# Patient Record
Sex: MALE | Race: WHITE | NOT HISPANIC OR LATINO | Employment: FULL TIME
[De-identification: names, ages, dates, MRNs, and addresses within clinical notes are randomized per-mention and may not be internally consistent; named-entity substitution may affect disease eponyms.]

---

## 2019-11-04 ENCOUNTER — HEALTH MAINTENANCE LETTER (OUTPATIENT)
Age: 54
End: 2019-11-04

## 2020-02-20 ENCOUNTER — TRANSFERRED RECORDS (OUTPATIENT)
Dept: HEALTH INFORMATION MANAGEMENT | Facility: CLINIC | Age: 55
End: 2020-02-20
Payer: COMMERCIAL

## 2020-11-10 ENCOUNTER — TELEPHONE (OUTPATIENT)
Dept: FAMILY MEDICINE | Facility: OTHER | Age: 55
End: 2020-11-10

## 2020-11-10 NOTE — TELEPHONE ENCOUNTER
Reason for Call:  Other appointment    Detailed comments: Patient calling stating that there son was exposed to someone who had covid and there sone lives with them. Patient calling to see if they need to reschedule there appt for tomorrow or if they can still come in. Patient stated dont have any symptoms. Please give patient a call back and advise.    Phone Number Patient can be reached at: Cell number on file:    Telephone Information:   Mobile [phone]       Best Time: Anytime     Can we leave a detailed message on this number? Yes     Call taken on 11/10/2020 at 11:34 AM by Mary Taylor

## 2020-11-10 NOTE — TELEPHONE ENCOUNTER
Advised patient and wife of message below. Patient would like to change visit to video visit. Patient would like to meet provider to establish care and discuss medication. Closing encounter.

## 2020-11-10 NOTE — TELEPHONE ENCOUNTER
If your son has no symptoms, and you have not been particularly close to him since you became aware of his exposure, then I think we could continue with your visit tomorrow.  If your son is having symptoms, or you feel like your exposure to him was fairly significant and his exposure was about a week ago, then I would recommend rescheduling.     If you prefer, we could also schedule this as a virtual visit.

## 2020-11-16 ENCOUNTER — HEALTH MAINTENANCE LETTER (OUTPATIENT)
Age: 55
End: 2020-11-16

## 2020-12-10 ENCOUNTER — TELEPHONE (OUTPATIENT)
Dept: FAMILY MEDICINE | Facility: OTHER | Age: 55
End: 2020-12-10

## 2020-12-10 NOTE — TELEPHONE ENCOUNTER
I don't see that this patient is taking metformin and it is also not in his HX. Closing encounter    Natalia Johnson MA

## 2020-12-17 DIAGNOSIS — R73.02 GLUCOSE INTOLERANCE (IMPAIRED GLUCOSE TOLERANCE): Primary | ICD-10-CM

## 2020-12-17 NOTE — TELEPHONE ENCOUNTER
I have not formally established care with this patient.  He will need to schedule a visit for me to fill this medication.

## 2020-12-21 NOTE — TELEPHONE ENCOUNTER
Metformin - he is asking if he will be fine to go without until scheduled appt. On 1/6    He was scheduled on 11/11 and it is marked as a no show. He was at 8:30 and wife was at 9. Her chart was documented on and checked in, his was not, but he does remember having the visit with you at that same time. Please review and if approved please send in the Metformin.

## 2020-12-22 RX ORDER — METFORMIN HCL 500 MG
500 TABLET, EXTENDED RELEASE 24 HR ORAL
Qty: 20 TABLET | Refills: 0 | Status: SHIPPED | OUTPATIENT
Start: 2020-12-22 | End: 2021-01-06

## 2020-12-22 NOTE — TELEPHONE ENCOUNTER
LMTRC- please let patient know script was sent for him and additional refills will be sent when he is seen at his upcoming appt

## 2020-12-22 NOTE — TELEPHONE ENCOUNTER
The problem is that he was scheduled for a physical, and those can't be done virtually.  My recollection is that we did briefly discuss this and he elected to not go ahead with a formal visit at that time (though he was present during his wife's visit).  I recommended that he schedule with me in the near future.  Since he does have an appointment, I will give enough to get to that appointment, but if this doesn't occur for some reason, I will be unable to continue to prescribe these

## 2020-12-23 NOTE — TELEPHONE ENCOUNTER
Spoke to patient and let him know about script sent. He will come fasting to appt incase he needs labs.

## 2020-12-30 NOTE — PROGRESS NOTES
Subjective     Rajinder Siegel is a 55 year old male who presents to clinic today for the following health issues:    HPI         {SUPERLIST (Optional):655828}  {additonal problems for provider to add (Optional):096533}    Review of Systems   {ROS COMP (Optional):424951}      Objective    There were no vitals taken for this visit.  There is no height or weight on file to calculate BMI.  Physical Exam   {Exam List (Optional):109733}    {Diagnostic Test Results (Optional):899432}        {PROVIDER CHARTING PREFERENCE:067573}

## 2021-01-06 ENCOUNTER — VIRTUAL VISIT (OUTPATIENT)
Dept: FAMILY MEDICINE | Facility: OTHER | Age: 56
End: 2021-01-06
Payer: COMMERCIAL

## 2021-01-06 DIAGNOSIS — I10 ESSENTIAL HYPERTENSION, BENIGN: ICD-10-CM

## 2021-01-06 DIAGNOSIS — Z11.4 SCREENING FOR HIV (HUMAN IMMUNODEFICIENCY VIRUS): ICD-10-CM

## 2021-01-06 DIAGNOSIS — R21 RASH OF HAND: ICD-10-CM

## 2021-01-06 DIAGNOSIS — E78.2 MIXED HYPERLIPIDEMIA: ICD-10-CM

## 2021-01-06 DIAGNOSIS — R25.2 LEG CRAMPING: ICD-10-CM

## 2021-01-06 DIAGNOSIS — Z11.59 NEED FOR HEPATITIS C SCREENING TEST: ICD-10-CM

## 2021-01-06 DIAGNOSIS — R73.02 GLUCOSE INTOLERANCE (IMPAIRED GLUCOSE TOLERANCE): Primary | ICD-10-CM

## 2021-01-06 PROCEDURE — 99214 OFFICE O/P EST MOD 30 MIN: CPT | Mod: 95 | Performed by: FAMILY MEDICINE

## 2021-01-06 RX ORDER — ATORVASTATIN CALCIUM 20 MG/1
20 TABLET, FILM COATED ORAL DAILY
Qty: 90 TABLET | Refills: 3 | Status: SHIPPED | OUTPATIENT
Start: 2021-01-06 | End: 2022-01-21

## 2021-01-06 RX ORDER — ATORVASTATIN CALCIUM 20 MG/1
20 TABLET, FILM COATED ORAL DAILY
COMMUNITY
End: 2021-01-06

## 2021-01-06 RX ORDER — ATENOLOL AND CHLORTHALIDONE TABLET 50; 25 MG/1; MG/1
1 TABLET ORAL DAILY
Qty: 90 TABLET | Refills: 3 | Status: SHIPPED | OUTPATIENT
Start: 2021-01-06 | End: 2022-06-10

## 2021-01-06 RX ORDER — METFORMIN HCL 500 MG
500 TABLET, EXTENDED RELEASE 24 HR ORAL
Qty: 90 TABLET | Refills: 3 | Status: SHIPPED | OUTPATIENT
Start: 2021-01-06 | End: 2022-01-21

## 2021-01-06 NOTE — PATIENT INSTRUCTIONS
Thank you for visiting Our [facility]    Please get fasting labs in about a week.    We'll let you know your lab results as soon as we can.     Try magnesium for  Your tight muscles.    Try hydrocortisone cream for your rash.  If not improving, we should take a look at it.    Good  [place] with everything else.  Let me know if you need help.    Contact us or return if questions or concerns.     Have a nice day!    Dr. Jolley     Return in about 6 months (around 7/6/2021) for Recheck.      If you need medication refills, please contact your pharmacy 3 days before your prescriptions runs out or download the [facility] gulshan for your smart phone.   If you are out of refills, your pharmacy will contact contact the clinic.                                     [employer] assistance [phone]

## 2021-01-06 NOTE — PROGRESS NOTES
Rajinder Siegel is a 56 year old male who is being evaluated via a billable telephone visit.      What phone number would you like to be contacted at? [phone]  How would you like to obtain your AVS? MyChart  Assessment & Plan       ICD-10-CM    1. Glucose intolerance (impaired glucose tolerance)  R73.02 Lipid panel reflex to direct LDL Fasting     **A1C FUTURE 3mo     metFORMIN (GLUCOPHAGE-XR) 500 MG 24 hr tablet   2. Essential hypertension, benign  I10 Comprehensive metabolic panel     atenolol-chlorthalidone (TENORETIC) 50-25 MG tablet     Magnesium   3. Mixed hyperlipidemia  E78.2 Lipid panel reflex to direct LDL Fasting     Comprehensive metabolic panel     atenolol-chlorthalidone (TENORETIC) 50-25 MG tablet     atorvastatin (LIPITOR) 20 MG tablet   4. Leg cramping  R25.2 Magnesium   5. Rash of hand  R21    6. Need for hepatitis C screening test  Z11.59 Hepatitis C Screen Reflex to HCV RNA Quant and Genotype   7. Screening for HIV (human immunodeficiency virus)  Z11.4 HIV Antigen Antibody Combo      1.  Clinically tolerating Metformin well.  He would be interested in stopping this.  He has lost about 35 pounds over the last several months.  We will recheck A1c and determine if he needs to continue with Metformin or not.  Continue efforts with lifestyle modifications to prevent progression to diabetes.  2.  Clinically controlled.  Will continue current regimen and check monitoring labs.  3.  Clinically controlled.  Will continue current statin therapy and check monitoring labs.  4.  Possibly reaction to atorvastatin, but this seems unlikely.  I wonder about a possible electrolyte imbalance.  We will check those today.  Recommended trial of magnesium to see if this is helpful.  If not responding, could consider co-Q10 or in clinic evaluation.  5.  Unclear etiology.  Somewhat suspicious for chronic eczema.  Recommended trial of over-the-counter hydrocortisone.  If not responding, will need to see patient in  person.  6, 7.  Screening ordered.    Portions of this note were completed using Dragon dictation software.  Although reviewed, there may be typographical and other inadvertent errors that remain.         30 minutes spent on the date of the encounter doing chart review, history and exam, documentation and further activities as noted above           Patient Instructions   Thank you for visiting [facility]    Please get fasting labs in about a week.    We'll let you know your lab results as soon as we can.     Try magnesium for  Your tight muscles.    Try hydrocortisone cream for your rash.  If not improving, we should take a look at it.    Good  [place] with everything else.  Let me know if you need help.    Contact us or return if questions or concerns.     Have a nice day!    Dr. Jolley     Return in about 6 months (around 2021) for Recheck.      If you need medication refills, please contact your pharmacy 3 days before your prescriptions runs out or download the [facility] gulshan for your smart phone.   If you are out of refills, your pharmacy will contact contact the clinic.                                     [employer] assistance [phone]                       Return in about 6 months (around 2021) for Recheck.    Marco Jolley MD, MD  [facility]     Rajinder Siegel is a 56 year old who presents to clinic today for a phone visit.    HPI     2021 Mailed TRAVIS to patient for Previous Clinic:  [facility]   Kristina St MD     Pt's mother  yesterday.  Had viral pneumonia.      Pt was recently exposed to COVID when their son-in-law picked [place] a friend at the airport.  [place]  Son-in-law found out on the way home from pt's birthday party 2 days ago.      This is why today's visit is via telephone.    His last colonoscopy was at age 49, was normal.      Pt notes a rash on his hand for a long  time.  There was no e/o fungal infection previously.  Not sure what it is.     Notes some muscle tightness in his legs.      Diabetes Follow-up    How often are you checking your blood sugar? Not at all    What concerns do you have today about your diabetes? None     Do you have any of these symptoms? (Select all that apply)  No numbness or tingling in feet.  No redness, sores or blisters on feet.  No complaints of excessive thirst.  No reports of blurry vision.  No significant changes to weight.        Pt is on metformin for  Prediabetes.  He has lost 35 lbs  Since his last a1c.      Would like to get tested  To see  If he still needs  To say on metformin.      Hyperlipidemia Follow-Up      Are you regularly taking any medication or supplement to lower your cholesterol?   Yes- atorvastatin    Are you having muscle aches or other side effects that you think could be caused by your cholesterol lowering medication?  No    Hypertension Follow-up      Do you check your blood pressure regularly outside of the clinic? Yes     Are you following a low salt diet? Yes    Are your blood pressures ever more than 140 on the top number (systolic) OR more   than 90 on the bottom number (diastolic), for example 140/90? No    BP Readings from Last 2 Encounters:   01/10/14 124/70   10/09/13 127/83     Hemoglobin A1C (%)   Date Value   08/27/2013 5.8   03/12/2013 6.2 (H)     LDL Cholesterol Calculated (mg/dL)   Date Value   08/27/2013 86   03/08/2013 106           Review of Systems   Constitutional, HEENT, cardiovascular, pulmonary, gi and gu systems are negative, except as otherwise noted.      Objective    Vitals - Patient Reported  Systolic (Patient Reported): 128  Diastolic (Patient Reported): 80  Weight (Patient Reported): 93 kg (205 lb)      Vitals:  No vitals were obtained today due to virtual visit.    Physical Exam   healthy, alert and no distress  PSYCH: Alert and oriented times 3; coherent speech, normal   rate and volume,  able to articulate logical thoughts, able   to abstract reason, no tangential thoughts, no hallucinations   or delusions  His affect is normal  RESP: No cough, no audible wheezing, able to talk in full sentences  Remainder of exam unable to be completed due to telephone visits    No results found for any visits on 01/06/21.            Phone call duration: 25 minutes

## 2021-01-13 DIAGNOSIS — R73.02 GLUCOSE INTOLERANCE (IMPAIRED GLUCOSE TOLERANCE): ICD-10-CM

## 2021-01-13 DIAGNOSIS — R25.2 LEG CRAMPING: ICD-10-CM

## 2021-01-13 DIAGNOSIS — E78.2 MIXED HYPERLIPIDEMIA: ICD-10-CM

## 2021-01-13 DIAGNOSIS — Z11.4 SCREENING FOR HIV (HUMAN IMMUNODEFICIENCY VIRUS): ICD-10-CM

## 2021-01-13 DIAGNOSIS — Z11.59 NEED FOR HEPATITIS C SCREENING TEST: ICD-10-CM

## 2021-01-13 DIAGNOSIS — I10 ESSENTIAL HYPERTENSION, BENIGN: ICD-10-CM

## 2021-01-13 LAB
ALBUMIN SERPL-MCNC: 4.1 G/DL (ref 3.4–5)
ALP SERPL-CCNC: 70 U/L (ref 40–150)
ALT SERPL W P-5'-P-CCNC: 48 U/L (ref 0–70)
ANION GAP SERPL CALCULATED.3IONS-SCNC: 4 MMOL/L (ref 3–14)
AST SERPL W P-5'-P-CCNC: 28 U/L (ref 0–45)
BILIRUB SERPL-MCNC: 1.9 MG/DL (ref 0.2–1.3)
BUN SERPL-MCNC: 11 MG/DL (ref 7–30)
CALCIUM SERPL-MCNC: 8.9 MG/DL (ref 8.5–10.1)
CHLORIDE SERPL-SCNC: 103 MMOL/L (ref 94–109)
CHOLEST SERPL-MCNC: 117 MG/DL
CO2 SERPL-SCNC: 32 MMOL/L (ref 20–32)
CREAT SERPL-MCNC: 0.89 MG/DL (ref 0.66–1.25)
GFR SERPL CREATININE-BSD FRML MDRD: >90 ML/MIN/{1.73_M2}
GLUCOSE SERPL-MCNC: 141 MG/DL (ref 70–99)
HCV AB SERPL QL IA: NONREACTIVE
HDLC SERPL-MCNC: 35 MG/DL
HIV 1+2 AB+HIV1 P24 AG SERPL QL IA: NONREACTIVE
LDLC SERPL CALC-MCNC: 40 MG/DL
MAGNESIUM SERPL-MCNC: 2.2 MG/DL (ref 1.6–2.3)
NONHDLC SERPL-MCNC: 82 MG/DL
POTASSIUM SERPL-SCNC: 3.4 MMOL/L (ref 3.4–5.3)
PROT SERPL-MCNC: 7.4 G/DL (ref 6.8–8.8)
SODIUM SERPL-SCNC: 139 MMOL/L (ref 133–144)
TRIGL SERPL-MCNC: 209 MG/DL

## 2021-01-13 PROCEDURE — 83735 ASSAY OF MAGNESIUM: CPT | Performed by: FAMILY MEDICINE

## 2021-01-13 PROCEDURE — 80053 COMPREHEN METABOLIC PANEL: CPT | Performed by: FAMILY MEDICINE

## 2021-01-13 PROCEDURE — 80061 LIPID PANEL: CPT | Performed by: FAMILY MEDICINE

## 2021-01-13 PROCEDURE — 86803 HEPATITIS C AB TEST: CPT | Performed by: FAMILY MEDICINE

## 2021-01-13 PROCEDURE — 87389 HIV-1 AG W/HIV-1&-2 AB AG IA: CPT | Performed by: FAMILY MEDICINE

## 2021-01-13 PROCEDURE — 36415 COLL VENOUS BLD VENIPUNCTURE: CPT | Performed by: FAMILY MEDICINE

## 2021-01-14 NOTE — RESULT ENCOUNTER NOTE
Rajinder,    Most of your labs were normal for you.  Unfortunately, your blood sugar was elevated into the diabetic range.  You definitely need to stay on the Metformin.  We should actually think about how you take twice the dose.  I would recommend that we do an A1c test to evaluate what your blood sugars have been over the last 3 months.  Unfortunately, they did not draw the right tube for this at your last visit.  You can either come in soon or get this done in 3 months.    Additionally, your bilirubin is mildly elevated.  This probably is nothing, but we should recheck this with your next blood draw.    Have a nice day!    Dr. Jolley

## 2021-01-15 ENCOUNTER — HEALTH MAINTENANCE LETTER (OUTPATIENT)
Age: 56
End: 2021-01-15

## 2021-01-19 DIAGNOSIS — R73.02 GLUCOSE INTOLERANCE (IMPAIRED GLUCOSE TOLERANCE): ICD-10-CM

## 2021-01-19 LAB — HBA1C MFR BLD: 6 % (ref 0–5.6)

## 2021-01-19 PROCEDURE — 36415 COLL VENOUS BLD VENIPUNCTURE: CPT | Performed by: FAMILY MEDICINE

## 2021-01-19 PROCEDURE — 83036 HEMOGLOBIN GLYCOSYLATED A1C: CPT | Performed by: FAMILY MEDICINE

## 2021-01-19 NOTE — RESULT ENCOUNTER NOTE
Rajinder,    All of your labs were normal for you.  I would recommend continuing metformin.    Have a nice day!    Dr. Jolley

## 2021-05-29 ENCOUNTER — HEALTH MAINTENANCE LETTER (OUTPATIENT)
Age: 56
End: 2021-05-29

## 2021-06-11 ENCOUNTER — MYC MEDICAL ADVICE (OUTPATIENT)
Dept: FAMILY MEDICINE | Facility: OTHER | Age: 56
End: 2021-06-11

## 2021-07-06 ENCOUNTER — TELEPHONE (OUTPATIENT)
Dept: FAMILY MEDICINE | Facility: OTHER | Age: 56
End: 2021-07-06

## 2021-07-06 DIAGNOSIS — R73.02 GLUCOSE INTOLERANCE (IMPAIRED GLUCOSE TOLERANCE): Primary | ICD-10-CM

## 2021-07-08 DIAGNOSIS — R73.02 GLUCOSE INTOLERANCE (IMPAIRED GLUCOSE TOLERANCE): ICD-10-CM

## 2021-07-08 LAB — HBA1C MFR BLD: 6 % (ref 0–5.6)

## 2021-07-08 PROCEDURE — 83036 HEMOGLOBIN GLYCOSYLATED A1C: CPT | Performed by: PHYSICIAN ASSISTANT

## 2021-07-08 PROCEDURE — 36415 COLL VENOUS BLD VENIPUNCTURE: CPT | Performed by: PHYSICIAN ASSISTANT

## 2021-07-13 NOTE — PROGRESS NOTES
{PROVIDER CHARTING PREFERENCE:448762}    Subjective   Rajinder is a 56 year old who presents for the following health issues {ACCOMPANIED BY STATEMENT (Optional):519473}    HPI     {SUPERLIST (Optional):824423}  {additonal problems for provider to add (Optional):938143}    Review of Systems   {ROS COMP (Optional):992284}      Objective    There were no vitals taken for this visit.  There is no height or weight on file to calculate BMI.  Physical Exam   {Exam List (Optional):674359}    {Diagnostic Test Results (Optional):050015}    {AMBULATORY ATTESTATION (Optional):001130}

## 2021-07-14 ENCOUNTER — MYC MEDICAL ADVICE (OUTPATIENT)
Dept: FAMILY MEDICINE | Facility: OTHER | Age: 56
End: 2021-07-14

## 2021-07-15 ENCOUNTER — VIRTUAL VISIT (OUTPATIENT)
Dept: FAMILY MEDICINE | Facility: OTHER | Age: 56
End: 2021-07-15
Payer: COMMERCIAL

## 2021-07-15 DIAGNOSIS — J06.9 UPPER RESPIRATORY TRACT INFECTION, UNSPECIFIED TYPE: Primary | ICD-10-CM

## 2021-07-15 DIAGNOSIS — Z11.52 ENCOUNTER FOR SCREENING LABORATORY TESTING FOR COVID-19 VIRUS: ICD-10-CM

## 2021-07-15 DIAGNOSIS — G47.33 OSA ON CPAP: ICD-10-CM

## 2021-07-15 DIAGNOSIS — R73.02 GLUCOSE INTOLERANCE (IMPAIRED GLUCOSE TOLERANCE): ICD-10-CM

## 2021-07-15 DIAGNOSIS — I10 ESSENTIAL HYPERTENSION, BENIGN: ICD-10-CM

## 2021-07-15 PROCEDURE — 99214 OFFICE O/P EST MOD 30 MIN: CPT | Mod: 95 | Performed by: FAMILY MEDICINE

## 2021-07-15 NOTE — PATIENT INSTRUCTIONS
Thank you for visiting Our [facility]    Try the Flonase to help with your nasal congestion.  Can use twice daily if needed while you're ill.     If not improving in the next day or 2, then I would recommend proceeding with a Covid test.    Your blood sugars are stable based upon our labs.  It sounds like your blood pressures have been good too.    I have placed an order for your CPAP supplies.  If any difficulties with these or if you feel like we need to reassess, we can certainly get you in with the sleep specialist for a reevaluation of your CPAP.    Contact us or return if questions or concerns.     Have a nice day!    Dr. Jolley     No follow-ups on file.      If you need medication refills, please contact your pharmacy 3 days before your prescriptions runs out or download the [facility] gulshan for your smart phone. If you are out of refills, your pharmacy will contact contact the clinic.                                     MyChart Assistance [phone]

## 2021-07-15 NOTE — PROGRESS NOTES
Rajinder is a 56 year old who is being evaluated via a billable telephone visit.      What phone number would you like to be contacted at? [phone]  How would you like to obtain your AVS? MyChart    Assessment & Plan       ICD-10-CM    1. Upper respiratory tract infection, unspecified type  J06.9 Symptomatic COVID-19 Virus (Coronavirus) by PCR Nasopharyngeal   2. Encounter for screening laboratory testing for COVID-19 virus  Z20.822 Symptomatic COVID-19 Virus (Coronavirus) by PCR Nasopharyngeal   3. Glucose intolerance (impaired glucose tolerance)  R73.02    4. RACHEAL on CPAP  G47.33 CPAP Order for DME - ONLY FOR DME    Z99.89    5. Essential hypertension, benign  I10       1, 2.  Clinically suspicious for a viral process.  Cannot rule out the possibility of Covid infection.  Covid testing was ordered today.  Discussed symptomatic treatment with Flonase and further over-the-counter medications.  Follow-up if not improving.  3.  Likely prediabetic for well-controlled diabetes.  Will continue current regimen.  4.  Clinically doing well on CPAP.  This was reordered.  We will continue to manage as able.  5.  Clinically controlled.  Will continue current regimen.    Portions of this note were completed using Dragon dictation software.  Although reviewed, there may be typographical and other inadvertent errors that remain.       Ordering of each unique test  Prescription drug management  30 minutes spent on the date of the encounter doing chart review, history and exam, documentation and further activities per the note       Patient Instructions   Thank you for visiting [facility]    Try the Flonase to help with your nasal congestion.  Can use twice daily if needed while you're ill.     If not improving in the next day or 2, then I would recommend proceeding with a Covid test.    Your blood sugars are stable based upon our labs.  It sounds like your blood pressures have been good too.    I have placed an  order for your CPAP supplies.  If any difficulties with these or if you feel like we need to reassess, we can certainly get you in with the sleep specialist for a reevaluation of your CPAP.    Contact us or return if questions or concerns.     Have a nice day!    Dr. Jolley     No follow-ups on file.      If you need medication refills, please contact your pharmacy 3 days before your prescriptions runs out or download the [facility] gulshan for your smart phone. If you are out of refills, your pharmacy will contact contact the clinic.                                     Tracie Rome [phone]                       Return in about 6 months (around 1/15/2022) for Recheck.    Marco Jolley MD, MD  [facility] ADITI Calvillo is a 56 year old who presents for the following health issues     HPI     Discuss CPAP machine/supplies.  Got his CPAP 15 years ago.  It seems to be working well.  He can tell that it  Helps with his breathing.      Diabetes Follow-up    How often are you checking your blood sugar? Not at all    What concerns do you have today about your diabetes? Discuss recent lab results.      Do you have any of these symptoms? (Select all that apply)  No numbness or tingling in feet.  No redness, sores or blisters on feet.  No complaints of excessive thirst.  No reports of blurry vision.  No significant changes to weight.     His recent a1c was good.            Hyperlipidemia Follow-Up    Are you regularly taking any medication or supplement to lower your cholesterol?   Yes- Atorvastatin     Are you having muscle aches or other side effects that you think could be caused by your cholesterol lowering medication?  Unsure    Hypertension Follow-up    Do you check your blood pressure regularly outside of the clinic? Yes - occasionally, not everyday.     Are you following a low salt diet? Yes    Are your blood pressures ever more than 140 on the top number (systolic)  OR more than 90 on the bottom number (diastolic), for example 140/90? No    BP Readings from Last 2 Encounters:   01/10/14 124/70   10/09/13 127/83     Hemoglobin A1C (%)   Date Value   07/08/2021 6.0 (H)   01/19/2021 6.0 (H)     LDL Cholesterol Calculated (mg/dL)   Date Value   01/13/2021 40   08/27/2013 86       How many servings of fruits and vegetables do you eat daily?  4 or more    On average, how many sweetened beverages do you drink each day (Examples: soda, juice, sweet tea, etc.  Do NOT count diet or artificially sweetened beverages)?   0    How many days per week do you exercise enough to make your heart beat faster? 3 or less    How many minutes a day do you exercise enough to make your heart beat faster? 9 or less    How many days per week do you miss taking your medication? 0    Acute Illness  Acute illness concerns: congestion, cough  Onset/Duration: about 3-4 days.   Symptoms:  Fever: unknown, doesn't have a thermometer.   Chills/Sweats: YES - hot and cold flashes.   Headache (location?): YES  Sinus Pressure: no  Conjunctivitis:  no  Ear Pain: no  Rhinorrhea: YES  Congestion: YES  Sore Throat: YES  Cough: YES-non-productive, with shortness of breath  Wheeze: YES  Decreased Appetite: no  Nausea: no  Vomiting: no  Diarrhea: no  Dysuria/Freq.: no  Dysuria or Hematuria: no  Fatigue/Achiness: YES  Sick/Strep Exposure: YES- grandson was sick first. Kept sneezing in his face.   Therapies tried and outcome: natural tea    Pt got URI about 2 days ago.  His grandson got sick first.  Has spread from there.  Grandson is much better now.  Having chest congestion, non-productive cough, sinus pain, HA.  Slight ST from the drainage.      He just started some otc medication for it.  Slight improvement.  Discussed trial of Flonase.          Review of Systems   Constitutional, HEENT, cardiovascular, pulmonary, gi and gu systems are negative, except as otherwise noted.      Objective           Vitals:  No vitals were  obtained today due to virtual visit.    Physical Exam   healthy, alert and no distress  PSYCH: Alert and oriented times 3; coherent speech, normal   rate and volume, able to articulate logical thoughts, able   to abstract reason, no tangential thoughts, no hallucinations   or delusions  His affect is normal  RESP: congestion, slight cough,  able to talk in full sentences  Remainder of exam unable to be completed due to telephone visits    Orders Only on 07/08/2021   Component Date Value Ref Range Status     Hemoglobin A1C 07/08/2021 6.0* 0 - 5.6 % Final    Comment: Normal <5.7% Prediabetes 5.7-6.4%  Diabetes 6.5% or higher - adopted from ADA   consensus guidelines.                   Phone call duration: 13 minutes

## 2021-07-15 NOTE — TELEPHONE ENCOUNTER
Will discuss with LIANA as he is already booked now during virtual spots. Not sure if he wants to keep at 330 but call in morning. Closing encounter  Kiley Castellano MA

## 2021-09-15 DIAGNOSIS — I10 ESSENTIAL HYPERTENSION, BENIGN: ICD-10-CM

## 2021-09-15 DIAGNOSIS — E78.2 MIXED HYPERLIPIDEMIA: ICD-10-CM

## 2021-09-15 RX ORDER — ATENOLOL 50 MG/1
50 TABLET ORAL DAILY
Qty: 90 TABLET | Refills: 0 | Status: SHIPPED | OUTPATIENT
Start: 2021-09-15 | End: 2022-01-21

## 2021-09-15 RX ORDER — CHLORTHALIDONE 25 MG/1
25 TABLET ORAL DAILY
Qty: 90 TABLET | Refills: 0 | Status: SHIPPED | OUTPATIENT
Start: 2021-09-15 | End: 2022-01-21

## 2021-09-18 ENCOUNTER — HEALTH MAINTENANCE LETTER (OUTPATIENT)
Age: 56
End: 2021-09-18

## 2021-11-10 ENCOUNTER — MYC MEDICAL ADVICE (OUTPATIENT)
Dept: FAMILY MEDICINE | Facility: OTHER | Age: 56
End: 2021-11-10
Payer: COMMERCIAL

## 2021-11-13 ENCOUNTER — HEALTH MAINTENANCE LETTER (OUTPATIENT)
Age: 56
End: 2021-11-13

## 2022-01-08 ENCOUNTER — HEALTH MAINTENANCE LETTER (OUTPATIENT)
Age: 57
End: 2022-01-08

## 2022-01-20 DIAGNOSIS — I10 ESSENTIAL HYPERTENSION, BENIGN: ICD-10-CM

## 2022-01-20 DIAGNOSIS — R73.02 GLUCOSE INTOLERANCE (IMPAIRED GLUCOSE TOLERANCE): ICD-10-CM

## 2022-01-20 DIAGNOSIS — E78.2 MIXED HYPERLIPIDEMIA: ICD-10-CM

## 2022-01-20 NOTE — TELEPHONE ENCOUNTER
Pending Prescriptions:                       Disp   Refills    atorvastatin (LIPITOR) 20 MG tablet [Pharm*90 tab*0        Sig: Take 1 tablet by mouth once daily    metFORMIN (GLUCOPHAGE-XR) 500 MG 24 hr tab*90 tab*0        Sig: TAKE 1 TABLET BY MOUTH ONCE DAILY WITH  DINNER    atenolol (TENORMIN) 50 MG tablet [Pharmacy*90 tab*0        Sig: Take 1 tablet by mouth once daily    chlorthalidone (HYGROTON) 25 MG tablet [Ph*90 tab*0        Sig: Take 1 tablet by mouth once daily    Routing refill request to provider for review/approval because:  Labs not current:    Lab Results   Component Value Date    A1C 6.0 07/08/2021    A1C 6.0 01/19/2021    A1C 5.8 08/27/2013    A1C 6.2 03/12/2013    A1C 5.8 06/15/2012     Creatinine   Date Value Ref Range Status   01/13/2021 0.89 0.66 - 1.25 mg/dL Final     BP Readings from Last 3 Encounters:   01/10/14 124/70   10/09/13 127/83   08/27/13 114/76     Potassium   Date Value Ref Range Status   01/13/2021 3.4 3.4 - 5.3 mmol/L Final     Recent Labs   Lab Test 01/13/21  1006   CHOL 117   HDL 35*   LDL 40   TRIG 209*       Patient needs to be seen because:  was told to return around 1/15/22 for recheck.     Sindi Quinn RN on 1/20/2022 at 12:54 PM

## 2022-01-21 RX ORDER — ATENOLOL 50 MG/1
TABLET ORAL
Qty: 30 TABLET | Refills: 0 | Status: SHIPPED | OUTPATIENT
Start: 2022-01-21 | End: 2022-02-21

## 2022-01-21 RX ORDER — CHLORTHALIDONE 25 MG/1
TABLET ORAL
Qty: 30 TABLET | Refills: 0 | Status: SHIPPED | OUTPATIENT
Start: 2022-01-21 | End: 2022-02-21

## 2022-01-21 RX ORDER — METFORMIN HCL 500 MG
TABLET, EXTENDED RELEASE 24 HR ORAL
Qty: 30 TABLET | Refills: 0 | Status: SHIPPED | OUTPATIENT
Start: 2022-01-21 | End: 2022-02-21

## 2022-01-21 RX ORDER — ATORVASTATIN CALCIUM 20 MG/1
TABLET, FILM COATED ORAL
Qty: 30 TABLET | Refills: 0 | Status: SHIPPED | OUTPATIENT
Start: 2022-01-21 | End: 2022-02-11 | Stop reason: SINTOL

## 2022-01-21 NOTE — TELEPHONE ENCOUNTER
Your medication has been refilled.  Please schedule a visit (well visit or virtual visit with labs OK) to follow up on how this medication is working for you before your next refill.  We need to be sure everything is working well and stable prior to further refills.

## 2022-01-24 ENCOUNTER — TELEPHONE (OUTPATIENT)
Dept: FAMILY MEDICINE | Facility: OTHER | Age: 57
End: 2022-01-24
Payer: COMMERCIAL

## 2022-01-24 DIAGNOSIS — G47.33 OSA ON CPAP: ICD-10-CM

## 2022-01-24 DIAGNOSIS — Z00.00 ENCOUNTER FOR PREVENTIVE CARE: ICD-10-CM

## 2022-01-24 DIAGNOSIS — I10 ESSENTIAL HYPERTENSION, BENIGN: ICD-10-CM

## 2022-01-24 DIAGNOSIS — R73.02 GLUCOSE INTOLERANCE (IMPAIRED GLUCOSE TOLERANCE): Primary | ICD-10-CM

## 2022-01-24 DIAGNOSIS — E78.2 MIXED HYPERLIPIDEMIA: ICD-10-CM

## 2022-01-24 NOTE — TELEPHONE ENCOUNTER
Reason for Call: Request for an order or referral:    Order or referral being requested: patient was told to get labs and an appointment, he would like labs done first so that they can be discussed at the appointment and would like those orders placed    Date needed: as soon as possible    Has the patient been seen by the PCP for this problem? YES    Additional comments: none    Phone number Patient can be reached at:  Cell number on file:    Telephone Information:   Mobile [phone]       Best Time:  Any     Can we leave a detailed message on this number?  YES    Call taken on 1/24/2022 at 5:06 PM by Abigail Elliott

## 2022-02-02 ENCOUNTER — LAB (OUTPATIENT)
Dept: LAB | Facility: OTHER | Age: 57
End: 2022-02-02
Payer: COMMERCIAL

## 2022-02-02 DIAGNOSIS — G47.33 OSA ON CPAP: ICD-10-CM

## 2022-02-02 DIAGNOSIS — Z00.00 ENCOUNTER FOR PREVENTIVE CARE: ICD-10-CM

## 2022-02-02 DIAGNOSIS — I10 ESSENTIAL HYPERTENSION, BENIGN: ICD-10-CM

## 2022-02-02 DIAGNOSIS — E78.2 MIXED HYPERLIPIDEMIA: ICD-10-CM

## 2022-02-02 DIAGNOSIS — R73.02 GLUCOSE INTOLERANCE (IMPAIRED GLUCOSE TOLERANCE): ICD-10-CM

## 2022-02-02 LAB
ALBUMIN SERPL-MCNC: 4 G/DL (ref 3.4–5)
ALP SERPL-CCNC: 63 U/L (ref 40–150)
ALT SERPL W P-5'-P-CCNC: 39 U/L (ref 0–70)
ANION GAP SERPL CALCULATED.3IONS-SCNC: 4 MMOL/L (ref 3–14)
AST SERPL W P-5'-P-CCNC: 28 U/L (ref 0–45)
BILIRUB SERPL-MCNC: 1.9 MG/DL (ref 0.2–1.3)
BUN SERPL-MCNC: 17 MG/DL (ref 7–30)
CALCIUM SERPL-MCNC: 9 MG/DL (ref 8.5–10.1)
CHLORIDE BLD-SCNC: 105 MMOL/L (ref 94–109)
CHOLEST SERPL-MCNC: 102 MG/DL
CO2 SERPL-SCNC: 31 MMOL/L (ref 20–32)
CREAT SERPL-MCNC: 0.91 MG/DL (ref 0.66–1.25)
ERYTHROCYTE [DISTWIDTH] IN BLOOD BY AUTOMATED COUNT: 13.4 % (ref 10–15)
FASTING STATUS PATIENT QL REPORTED: YES
GFR SERPL CREATININE-BSD FRML MDRD: >90 ML/MIN/1.73M2
GLUCOSE BLD-MCNC: 139 MG/DL (ref 70–99)
HBA1C MFR BLD: 6.2 % (ref 0–5.6)
HCT VFR BLD AUTO: 42.3 % (ref 40–53)
HDLC SERPL-MCNC: 32 MG/DL
HGB BLD-MCNC: 14.2 G/DL (ref 13.3–17.7)
LDLC SERPL CALC-MCNC: 37 MG/DL
MCH RBC QN AUTO: 29.8 PG (ref 26.5–33)
MCHC RBC AUTO-ENTMCNC: 33.6 G/DL (ref 31.5–36.5)
MCV RBC AUTO: 89 FL (ref 78–100)
NONHDLC SERPL-MCNC: 70 MG/DL
PLATELET # BLD AUTO: 211 10E3/UL (ref 150–450)
POTASSIUM BLD-SCNC: 3.7 MMOL/L (ref 3.4–5.3)
PROT SERPL-MCNC: 7.2 G/DL (ref 6.8–8.8)
RBC # BLD AUTO: 4.76 10E6/UL (ref 4.4–5.9)
SODIUM SERPL-SCNC: 140 MMOL/L (ref 133–144)
TRIGL SERPL-MCNC: 163 MG/DL
TSH SERPL DL<=0.005 MIU/L-ACNC: 2.4 MU/L (ref 0.4–4)
WBC # BLD AUTO: 6 10E3/UL (ref 4–11)

## 2022-02-02 PROCEDURE — 80061 LIPID PANEL: CPT

## 2022-02-02 PROCEDURE — 36415 COLL VENOUS BLD VENIPUNCTURE: CPT

## 2022-02-02 PROCEDURE — 85027 COMPLETE CBC AUTOMATED: CPT

## 2022-02-02 PROCEDURE — 83036 HEMOGLOBIN GLYCOSYLATED A1C: CPT

## 2022-02-02 PROCEDURE — 84443 ASSAY THYROID STIM HORMONE: CPT

## 2022-02-02 PROCEDURE — 80053 COMPREHEN METABOLIC PANEL: CPT

## 2022-02-03 ENCOUNTER — MYC REFILL (OUTPATIENT)
Dept: FAMILY MEDICINE | Facility: OTHER | Age: 57
End: 2022-02-03
Payer: COMMERCIAL

## 2022-02-03 DIAGNOSIS — R73.02 GLUCOSE INTOLERANCE (IMPAIRED GLUCOSE TOLERANCE): ICD-10-CM

## 2022-02-03 DIAGNOSIS — E78.2 MIXED HYPERLIPIDEMIA: ICD-10-CM

## 2022-02-03 DIAGNOSIS — I10 ESSENTIAL HYPERTENSION, BENIGN: ICD-10-CM

## 2022-02-03 RX ORDER — ATORVASTATIN CALCIUM 20 MG/1
20 TABLET, FILM COATED ORAL DAILY
Qty: 30 TABLET | Refills: 0 | OUTPATIENT
Start: 2022-02-03

## 2022-02-03 RX ORDER — ATENOLOL 50 MG/1
50 TABLET ORAL DAILY
Qty: 30 TABLET | Refills: 0 | OUTPATIENT
Start: 2022-02-03

## 2022-02-03 RX ORDER — CHLORTHALIDONE 25 MG/1
25 TABLET ORAL DAILY
Qty: 30 TABLET | Refills: 0 | OUTPATIENT
Start: 2022-02-03

## 2022-02-03 RX ORDER — METFORMIN HCL 500 MG
TABLET, EXTENDED RELEASE 24 HR ORAL
Qty: 30 TABLET | Refills: 0 | OUTPATIENT
Start: 2022-02-03

## 2022-02-07 ENCOUNTER — TELEPHONE (OUTPATIENT)
Dept: FAMILY MEDICINE | Facility: OTHER | Age: 57
End: 2022-02-07
Payer: COMMERCIAL

## 2022-02-07 NOTE — TELEPHONE ENCOUNTER
Reason for Call:  Form, our goal is to have forms completed with 72 hours, however, some forms may require a visit or additional information.    Type of letter, form or note:  medical    Who is the form from?: [employer] (if other please explain)    Where did the form come from: Patient or family brought in       What clinic location was the form placed at?: [facility] [phone]    Where the form was placed: form d bin    What number is listed as a contact on the form?: telephone [phone]       Additional comments: please call patient when done    Call taken on 2/7/2022 at 3:02 PM by Dolores Gay

## 2022-02-09 ENCOUNTER — MYC MEDICAL ADVICE (OUTPATIENT)
Dept: FAMILY MEDICINE | Facility: OTHER | Age: 57
End: 2022-02-09
Payer: COMMERCIAL

## 2022-02-09 DIAGNOSIS — E78.2 MIXED HYPERLIPIDEMIA: Primary | ICD-10-CM

## 2022-02-11 RX ORDER — ROSUVASTATIN CALCIUM 10 MG/1
10 TABLET, COATED ORAL DAILY
Qty: 90 TABLET | Refills: 0 | Status: SHIPPED | OUTPATIENT
Start: 2022-02-11 | End: 2022-05-24

## 2022-02-11 NOTE — TELEPHONE ENCOUNTER
, please review and advise.   Austin Mills, FEIN, RN, PHN  [place]  February 11, 2022

## 2022-02-21 ENCOUNTER — MYC REFILL (OUTPATIENT)
Dept: FAMILY MEDICINE | Facility: OTHER | Age: 57
End: 2022-02-21
Payer: COMMERCIAL

## 2022-02-21 DIAGNOSIS — I10 ESSENTIAL HYPERTENSION, BENIGN: ICD-10-CM

## 2022-02-21 DIAGNOSIS — R73.02 GLUCOSE INTOLERANCE (IMPAIRED GLUCOSE TOLERANCE): ICD-10-CM

## 2022-02-21 NOTE — TELEPHONE ENCOUNTER
"Pending Prescriptions:                       Disp   Refills    metFORMIN (GLUCOPHAGE-XR) 500 MG 24 hr tab*30 tab*0          atenolol (TENORMIN) 50 MG tablet           30 tab*0        Sig: Take 1 tablet (50 mg) by mouth daily    chlorthalidone (HYGROTON) 25 MG tablet     30 tab*0        Sig: Take 1 tablet (25 mg) by mouth daily    Routing refill request to provider for review/approval because:  Labs out of range:      Requested Prescriptions   Pending Prescriptions Disp Refills    metFORMIN (GLUCOPHAGE-XR) 500 MG 24 hr tablet 30 tablet 0        Biguanide Agents Failed - 2/21/2022 10:57 AM        Failed - Recent (6 mo) or future (30 days) visit within the authorizing provider's specialty     Patient had office visit in the last 6 months or has a visit in the next 30 days with authorizing provider or within the authorizing provider's specialty.  See \"Patient Info\" tab in inbasket, or \"Choose Columns\" in Meds & Orders section of the refill encounter.            Passed - Patient is age 10 or older        Passed - Patient has documented A1c within the specified period of time.     If HgbA1C is 8 or greater, it needs to be on file within the past 3 months.  If less than 8, must be on file within the past 6 months.     Recent Labs   Lab Test 02/02/22  0850   A1C 6.2*             Passed - Patient's CR is NOT>1.4 OR Patient's EGFR is NOT<45 within past 12 mos.       Recent Labs   Lab Test 02/02/22  0850 01/13/21  1006   GFRESTIMATED >90 >90   GFRESTBLACK  --  >90       Recent Labs   Lab Test 02/02/22  0850   CR 0.91             Passed - Patient does NOT have a diagnosis of CHF.        Passed - Medication is active on med list           atenolol (TENORMIN) 50 MG tablet 30 tablet 0     Sig: Take 1 tablet (50 mg) by mouth daily        Beta-Blockers Protocol Failed - 2/21/2022 10:57 AM        Failed - Blood pressure under 140/90 in past 12 months       BP Readings from Last 3 Encounters:   01/10/14 124/70   10/09/13 127/83 " "  08/27/13 114/76                 Passed - Patient is age 6 or older        Passed - Recent (12 mo) or future (30 days) visit within the authorizing provider's specialty     Patient has had an office visit with the authorizing provider or a provider within the authorizing providers department within the previous 12 mos or has a future within next 30 days. See \"Patient Info\" tab in inbasket, or \"Choose Columns\" in Meds & Orders section of the refill encounter.              Passed - Medication is active on med list           chlorthalidone (HYGROTON) 25 MG tablet 30 tablet 0     Sig: Take 1 tablet (25 mg) by mouth daily        Diuretics (Including Combos) Protocol Failed - 2/21/2022 10:57 AM        Failed - Blood pressure under 140/90 in past 12 months       BP Readings from Last 3 Encounters:   01/10/14 124/70   10/09/13 127/83   08/27/13 114/76                 Passed - Recent (12 mo) or future (30 days) visit within the authorizing provider's specialty     Patient has had an office visit with the authorizing provider or a provider within the authorizing providers department within the previous 12 mos or has a future within next 30 days. See \"Patient Info\" tab in inbasket, or \"Choose Columns\" in Meds & Orders section of the refill encounter.              Passed - Medication is active on med list        Passed - Patient is age 18 or older        Passed - Normal serum creatinine on file in past 12 months       Recent Labs   Lab Test 02/02/22  0850   CR 0.91              Passed - Normal serum potassium on file in past 12 months       Recent Labs   Lab Test 02/02/22  0850   POTASSIUM 3.7                    Passed - Normal serum sodium on file in past 12 months       Recent Labs   Lab Test 02/02/22  0850                             "

## 2022-02-24 RX ORDER — METFORMIN HCL 500 MG
500 TABLET, EXTENDED RELEASE 24 HR ORAL
Qty: 30 TABLET | Refills: 0 | Status: SHIPPED | OUTPATIENT
Start: 2022-02-24 | End: 2022-03-23

## 2022-02-24 RX ORDER — ATENOLOL 50 MG/1
50 TABLET ORAL DAILY
Qty: 30 TABLET | Refills: 0 | Status: SHIPPED | OUTPATIENT
Start: 2022-02-24 | End: 2022-03-25

## 2022-02-24 RX ORDER — CHLORTHALIDONE 25 MG/1
25 TABLET ORAL DAILY
Qty: 30 TABLET | Refills: 0 | Status: SHIPPED | OUTPATIENT
Start: 2022-02-24 | End: 2022-03-25

## 2022-03-23 DIAGNOSIS — I10 ESSENTIAL HYPERTENSION, BENIGN: ICD-10-CM

## 2022-03-23 DIAGNOSIS — R73.02 GLUCOSE INTOLERANCE (IMPAIRED GLUCOSE TOLERANCE): ICD-10-CM

## 2022-03-23 RX ORDER — METFORMIN HCL 500 MG
TABLET, EXTENDED RELEASE 24 HR ORAL
Qty: 30 TABLET | Refills: 0 | Status: SHIPPED | OUTPATIENT
Start: 2022-03-23 | End: 2022-04-28

## 2022-03-23 NOTE — TELEPHONE ENCOUNTER
"Pending Prescriptions:                       Disp   Refills    chlorthalidone (HYGROTON) 25 MG tablet [Ph*30 tab*0        Sig: Take 1 tablet by mouth once daily    atenolol (TENORMIN) 50 MG tablet [Pharmacy*30 tab*0        Sig: Take 1 tablet by mouth once daily    Signed Prescriptions:                        Disp   Refills    metFORMIN (GLUCOPHAGE-XR) 500 MG 24 hr tab*30 tab*0        Sig: TAKE 1 TABLET BY MOUTH ONCE DAILY WITH SUPPER  Authorizing Provider: LEELA RUIZ  Ordering User: TIMUR SAMPSON    Routing refill request to provider for review/approval because:  Labs not current:  BP Not current.  Appointment scheduled for 4/20/22    Requested Prescriptions   Pending Prescriptions Disp Refills    chlorthalidone (HYGROTON) 25 MG tablet [Pharmacy Med Name: Chlorthalidone 25 MG Oral Tablet] 30 tablet 0     Sig: Take 1 tablet by mouth once daily        Diuretics (Including Combos) Protocol Failed - 3/23/2022  5:30 AM        Failed - Blood pressure under 140/90 in past 12 months       BP Readings from Last 3 Encounters:   03/27/13 131/86                 Passed - Recent (12 mo) or future (30 days) visit within the authorizing provider's specialty     Patient has had an office visit with the authorizing provider or a provider within the authorizing providers department within the previous 12 mos or has a future within next 30 days. See \"Patient Info\" tab in inbasket, or \"Choose Columns\" in Meds & Orders section of the refill encounter.              Passed - Medication is active on med list        Passed - Patient is age 18 or older        Passed - Normal serum creatinine on file in past 12 months       Recent Labs   Lab Test 02/02/22  0850   CR 0.91              Passed - Normal serum potassium on file in past 12 months       Recent Labs   Lab Test 02/02/22  0850   POTASSIUM 3.7                    Passed - Normal serum sodium on file in past 12 months       Recent Labs   Lab Test 02/02/22  0850       " "             atenolol (TENORMIN) 50 MG tablet [Pharmacy Med Name: Atenolol 50 MG Oral Tablet] 30 tablet 0     Sig: Take 1 tablet by mouth once daily        Beta-Blockers Protocol Failed - 3/23/2022  5:30 AM        Failed - Blood pressure under 140/90 in past 12 months       BP Readings from Last 3 Encounters:   03/27/13 131/86                 Passed - Patient is age 6 or older        Passed - Recent (12 mo) or future (30 days) visit within the authorizing provider's specialty     Patient has had an office visit with the authorizing provider or a provider within the authorizing providers department within the previous 12 mos or has a future within next 30 days. See \"Patient Info\" tab in inbasket, or \"Choose Columns\" in Meds & Orders section of the refill encounter.              Passed - Medication is active on med list          Signed Prescriptions Disp Refills    metFORMIN (GLUCOPHAGE-XR) 500 MG 24 hr tablet 30 tablet 0     Sig: TAKE 1 TABLET BY MOUTH ONCE DAILY WITH SUPPER        Biguanide Agents Passed - 3/23/2022  5:30 AM        Passed - Patient is age 10 or older        Passed - Patient has documented A1c within the specified period of time.     If HgbA1C is 8 or greater, it needs to be on file within the past 3 months.  If less than 8, must be on file within the past 6 months.     Recent Labs   Lab Test 02/02/22  0850   A1C 6.2*             Passed - Patient's CR is NOT>1.4 OR Patient's EGFR is NOT<45 within past 12 mos.       Recent Labs   Lab Test 02/02/22  0850 01/13/21  1006   GFRESTIMATED >90 >90   GFRESTBLACK  --  >90       Recent Labs   Lab Test 02/02/22  0850   CR 0.91             Passed - Patient does NOT have a diagnosis of CHF.        Passed - Medication is active on med list        Passed - Recent (6 mo) or future (30 days) visit within the authorizing provider's specialty     Patient had office visit in the last 6 months or has a visit in the next 30 days with authorizing provider or within the " "authorizing provider's specialty.  See \"Patient Info\" tab in inbasket, or \"Choose Columns\" in Meds & Orders section of the refill encounter.                      "

## 2022-03-25 RX ORDER — CHLORTHALIDONE 25 MG/1
TABLET ORAL
Qty: 30 TABLET | Refills: 0 | Status: SHIPPED | OUTPATIENT
Start: 2022-03-25 | End: 2022-04-28

## 2022-03-25 RX ORDER — ATENOLOL 50 MG/1
TABLET ORAL
Qty: 30 TABLET | Refills: 0 | Status: SHIPPED | OUTPATIENT
Start: 2022-03-25 | End: 2022-04-28

## 2022-04-26 DIAGNOSIS — R73.02 GLUCOSE INTOLERANCE (IMPAIRED GLUCOSE TOLERANCE): ICD-10-CM

## 2022-04-26 DIAGNOSIS — I10 ESSENTIAL HYPERTENSION, BENIGN: ICD-10-CM

## 2022-04-28 RX ORDER — ATENOLOL 50 MG/1
TABLET ORAL
Qty: 30 TABLET | Refills: 0 | Status: SHIPPED | OUTPATIENT
Start: 2022-04-28 | End: 2022-05-25

## 2022-04-28 RX ORDER — CHLORTHALIDONE 25 MG/1
TABLET ORAL
Qty: 30 TABLET | Refills: 0 | Status: SHIPPED | OUTPATIENT
Start: 2022-04-28 | End: 2022-05-25

## 2022-04-28 RX ORDER — METFORMIN HCL 500 MG
TABLET, EXTENDED RELEASE 24 HR ORAL
Qty: 30 TABLET | Refills: 0 | Status: SHIPPED | OUTPATIENT
Start: 2022-04-28 | End: 2022-05-24

## 2022-04-28 NOTE — TELEPHONE ENCOUNTER
"Pending Prescriptions:                       Disp   Refills    atenolol (TENORMIN) 50 MG tablet [Pharmacy*30 tab*0        Sig: Take 1 tablet by mouth once daily    chlorthalidone (HYGROTON) 25 MG tablet [Ph*30 tab*0        Sig: Take 1 tablet by mouth once daily    metFORMIN (GLUCOPHAGE-XR) 500 MG 24 hr tab*30 tab*0        Sig: TAKE 1 TABLET BY MOUTH ONCE DAILY WITH SUPPER    Routing refill request to provider for review/approval because:  Appointments in Next Year      David 10, 2022 10:00 AM  (Arrive by 9:40 AM)  PHYSICAL with Marco Jolley MD  [facility] ([facility] [place] ) [phone]            Requested Prescriptions   Pending Prescriptions Disp Refills    atenolol (TENORMIN) 50 MG tablet [Pharmacy Med Name: Atenolol 50 MG Oral Tablet] 30 tablet 0     Sig: Take 1 tablet by mouth once daily        Beta-Blockers Protocol Failed - 4/28/2022  8:16 AM        Failed - Blood pressure under 140/90 in past 12 months       BP Readings from Last 3 Encounters:   03/27/13 131/86                 Passed - Patient is age 6 or older        Passed - Recent (12 mo) or future (30 days) visit within the authorizing provider's specialty     Patient has had an office visit with the authorizing provider or a provider within the authorizing providers department within the previous 12 mos or has a future within next 30 days. See \"Patient Info\" tab in inbasket, or \"Choose Columns\" in Meds & Orders section of the refill encounter.              Passed - Medication is active on med list           chlorthalidone (HYGROTON) 25 MG tablet [Pharmacy Med Name: Chlorthalidone 25 MG Oral Tablet] 30 tablet 0     Sig: Take 1 tablet by mouth once daily        Diuretics (Including Combos) Protocol Failed - 4/28/2022  8:16 AM        Failed - Blood pressure under 140/90 in past 12 months       BP Readings from Last 3 Encounters:   03/27/13 131/86                 Passed - Recent (12 mo) or future (30 " "days) visit within the authorizing provider's specialty     Patient has had an office visit with the authorizing provider or a provider within the authorizing providers department within the previous 12 mos or has a future within next 30 days. See \"Patient Info\" tab in inbasket, or \"Choose Columns\" in Meds & Orders section of the refill encounter.              Passed - Medication is active on med list        Passed - Patient is age 18 or older        Passed - Normal serum creatinine on file in past 12 months       Recent Labs   Lab Test 02/02/22  0850   CR 0.91              Passed - Normal serum potassium on file in past 12 months       Recent Labs   Lab Test 02/02/22  0850   POTASSIUM 3.7                    Passed - Normal serum sodium on file in past 12 months       Recent Labs   Lab Test 02/02/22  0850                    metFORMIN (GLUCOPHAGE-XR) 500 MG 24 hr tablet [Pharmacy Med Name: metFORMIN HCl  MG Oral Tablet Extended Release 24 Hour] 30 tablet 0     Sig: TAKE 1 TABLET BY MOUTH ONCE DAILY WITH SUPPER        Biguanide Agents Failed - 4/28/2022  8:16 AM        Failed - Recent (6 mo) or future (30 days) visit within the authorizing provider's specialty     Patient had office visit in the last 6 months or has a visit in the next 30 days with authorizing provider or within the authorizing provider's specialty.  See \"Patient Info\" tab in inbasket, or \"Choose Columns\" in Meds & Orders section of the refill encounter.            Passed - Patient is age 10 or older        Passed - Patient has documented A1c within the specified period of time.     If HgbA1C is 8 or greater, it needs to be on file within the past 3 months.  If less than 8, must be on file within the past 6 months.     Recent Labs   Lab Test 02/02/22  0850   A1C 6.2*             Passed - Patient's CR is NOT>1.4 OR Patient's EGFR is NOT<45 within past 12 mos.       Recent Labs   Lab Test 02/02/22  0850 01/13/21  1006   GFRESTIMATED >90 >90 "   GFRESTBLACK  --  >90       Recent Labs   Lab Test 02/02/22  0850   CR 0.91             Passed - Patient does NOT have a diagnosis of CHF.        Passed - Medication is active on med list

## 2022-05-23 DIAGNOSIS — R73.02 GLUCOSE INTOLERANCE (IMPAIRED GLUCOSE TOLERANCE): ICD-10-CM

## 2022-05-23 DIAGNOSIS — I10 ESSENTIAL HYPERTENSION, BENIGN: ICD-10-CM

## 2022-05-23 DIAGNOSIS — E78.2 MIXED HYPERLIPIDEMIA: ICD-10-CM

## 2022-05-24 RX ORDER — METFORMIN HCL 500 MG
TABLET, EXTENDED RELEASE 24 HR ORAL
Qty: 30 TABLET | Refills: 0 | Status: SHIPPED | OUTPATIENT
Start: 2022-05-24 | End: 2022-06-10

## 2022-05-24 RX ORDER — ROSUVASTATIN CALCIUM 10 MG/1
TABLET, COATED ORAL
Qty: 90 TABLET | Refills: 0 | Status: SHIPPED | OUTPATIENT
Start: 2022-05-24 | End: 2022-08-25

## 2022-05-24 NOTE — TELEPHONE ENCOUNTER
"Pending Prescriptions:                       Disp   Refills    atenolol (TENORMIN) 50 MG tablet [Pharmacy*30 tab*0        Sig: Take 1 tablet by mouth once daily    chlorthalidone (HYGROTON) 25 MG tablet [Ph*30 tab*0        Sig: Take 1 tablet by mouth once daily    Signed Prescriptions:                        Disp   Refills    rosuvastatin (CRESTOR) 10 MG tablet        90 tab*0        Sig: Take 1 tablet by mouth once daily  Authorizing Provider: LEELA RUIZ  Ordering User: JULI SMITH    metFORMIN (GLUCOPHAGE XR) 500 MG 24 hr tab*30 tab*0        Sig: TAKE 1 TABLET BY MOUTH ONCE DAILY WITH SUPPER  Authorizing Provider: LEELA RUIZ  Ordering User: JULI SMITH    Routing refill request to provider for review/approval because:  Does have upcoming visit.  BP is over 13 months, RN unable to provide a amaury refill.      Requested Prescriptions   Pending Prescriptions Disp Refills    atenolol (TENORMIN) 50 MG tablet [Pharmacy Med Name: Atenolol 50 MG Oral Tablet] 30 tablet 0     Sig: Take 1 tablet by mouth once daily        Beta-Blockers Protocol Failed - 5/23/2022  5:31 AM        Failed - Blood pressure under 140/90 in past 12 months       BP Readings from Last 3 Encounters:   03/27/13 131/86                 Passed - Patient is age 6 or older        Passed - Recent (12 mo) or future (30 days) visit within the authorizing provider's specialty     Patient has had an office visit with the authorizing provider or a provider within the authorizing providers department within the previous 12 mos or has a future within next 30 days. See \"Patient Info\" tab in inbasket, or \"Choose Columns\" in Meds & Orders section of the refill encounter.              Passed - Medication is active on med list           chlorthalidone (HYGROTON) 25 MG tablet [Pharmacy Med Name: Chlorthalidone 25 MG Oral Tablet] 30 tablet 0     Sig: Take 1 tablet by mouth once daily        Diuretics (Including Combos) Protocol Failed " "- 5/23/2022  5:31 AM        Failed - Blood pressure under 140/90 in past 12 months       BP Readings from Last 3 Encounters:   03/27/13 131/86                 Passed - Recent (12 mo) or future (30 days) visit within the authorizing provider's specialty     Patient has had an office visit with the authorizing provider or a provider within the authorizing providers department within the previous 12 mos or has a future within next 30 days. See \"Patient Info\" tab in inbasket, or \"Choose Columns\" in Meds & Orders section of the refill encounter.              Passed - Medication is active on med list        Passed - Patient is age 18 or older        Passed - Normal serum creatinine on file in past 12 months       Recent Labs   Lab Test 02/02/22  0850   CR 0.91              Passed - Normal serum potassium on file in past 12 months       Recent Labs   Lab Test 02/02/22  0850   POTASSIUM 3.7                    Passed - Normal serum sodium on file in past 12 months       Recent Labs   Lab Test 02/02/22  0850                   Signed Prescriptions Disp Refills    rosuvastatin (CRESTOR) 10 MG tablet 90 tablet 0     Sig: Take 1 tablet by mouth once daily        Statins Protocol Passed - 5/23/2022  5:31 AM        Passed - LDL on file in past 12 months     Recent Labs   Lab Test 02/02/22  0850   LDL 37               Passed - No abnormal creatine kinase in past 12 months     No lab results found.             Passed - Recent (12 mo) or future (30 days) visit within the authorizing provider's specialty     Patient has had an office visit with the authorizing provider or a provider within the authorizing providers department within the previous 12 mos or has a future within next 30 days. See \"Patient Info\" tab in inbasket, or \"Choose Columns\" in Meds & Orders section of the refill encounter.              Passed - Medication is active on med list        Passed - Patient is age 18 or older           metFORMIN (GLUCOPHAGE XR) 500 " "MG 24 hr tablet 30 tablet 0     Sig: TAKE 1 TABLET BY MOUTH ONCE DAILY WITH SUPPER        Biguanide Agents Passed - 5/23/2022  5:31 AM        Passed - Patient is age 10 or older        Passed - Patient has documented A1c within the specified period of time.     If HgbA1C is 8 or greater, it needs to be on file within the past 3 months.  If less than 8, must be on file within the past 6 months.     Recent Labs   Lab Test 02/02/22  0850   A1C 6.2*             Passed - Patient's CR is NOT>1.4 OR Patient's EGFR is NOT<45 within past 12 mos.       Recent Labs   Lab Test 02/02/22  0850 01/13/21  1006   GFRESTIMATED >90 >90   GFRESTBLACK  --  >90       Recent Labs   Lab Test 02/02/22  0850   CR 0.91             Passed - Patient does NOT have a diagnosis of CHF.        Passed - Medication is active on med list        Passed - Recent (6 mo) or future (30 days) visit within the authorizing provider's specialty     Patient had office visit in the last 6 months or has a visit in the next 30 days with authorizing provider or within the authorizing provider's specialty.  See \"Patient Info\" tab in inbasket, or \"Choose Columns\" in Meds & Orders section of the refill encounter.                        "

## 2022-05-25 RX ORDER — CHLORTHALIDONE 25 MG/1
TABLET ORAL
Qty: 30 TABLET | Refills: 0 | Status: SHIPPED | OUTPATIENT
Start: 2022-05-25 | End: 2022-06-10

## 2022-05-25 RX ORDER — ATENOLOL 50 MG/1
TABLET ORAL
Qty: 30 TABLET | Refills: 0 | Status: SHIPPED | OUTPATIENT
Start: 2022-05-25 | End: 2022-06-10

## 2022-06-09 ASSESSMENT — ENCOUNTER SYMPTOMS
CONSTIPATION: 0
HEMATOCHEZIA: 0
CHILLS: 0
JOINT SWELLING: 1
HEARTBURN: 0
COUGH: 0
SORE THROAT: 0
DIZZINESS: 0
FREQUENCY: 0
ARTHRALGIAS: 1
EYE PAIN: 0
DIARRHEA: 0
PALPITATIONS: 0
DYSURIA: 0
HEADACHES: 0
NERVOUS/ANXIOUS: 0
WEAKNESS: 0
PARESTHESIAS: 0
FEVER: 0
MYALGIAS: 1
SHORTNESS OF BREATH: 0
HEMATURIA: 0
NAUSEA: 0
ABDOMINAL PAIN: 0

## 2022-06-10 ENCOUNTER — OFFICE VISIT (OUTPATIENT)
Dept: FAMILY MEDICINE | Facility: OTHER | Age: 57
End: 2022-06-10
Payer: COMMERCIAL

## 2022-06-10 VITALS
HEART RATE: 73 BPM | TEMPERATURE: 98.5 F | BODY MASS INDEX: 30.71 KG/M2 | OXYGEN SATURATION: 97 % | SYSTOLIC BLOOD PRESSURE: 128 MMHG | WEIGHT: 214 LBS | DIASTOLIC BLOOD PRESSURE: 82 MMHG

## 2022-06-10 DIAGNOSIS — M79.10 MYALGIA: ICD-10-CM

## 2022-06-10 DIAGNOSIS — Z00.00 ROUTINE GENERAL MEDICAL EXAMINATION AT A HEALTH CARE FACILITY: Primary | ICD-10-CM

## 2022-06-10 DIAGNOSIS — R23.8 PAPULE OF SKIN: ICD-10-CM

## 2022-06-10 DIAGNOSIS — B35.1 ONYCHOMYCOSIS: ICD-10-CM

## 2022-06-10 DIAGNOSIS — G25.81 RESTLESS LEG SYNDROME: ICD-10-CM

## 2022-06-10 DIAGNOSIS — M25.561 ACUTE PAIN OF RIGHT KNEE: ICD-10-CM

## 2022-06-10 DIAGNOSIS — R73.02 GLUCOSE INTOLERANCE (IMPAIRED GLUCOSE TOLERANCE): ICD-10-CM

## 2022-06-10 DIAGNOSIS — I10 ESSENTIAL HYPERTENSION, BENIGN: ICD-10-CM

## 2022-06-10 DIAGNOSIS — L20.84 INTRINSIC ECZEMA: ICD-10-CM

## 2022-06-10 LAB
CK SERPL-CCNC: 93 U/L (ref 30–300)
CRP SERPL-MCNC: <2.9 MG/L (ref 0–8)
FERRITIN SERPL-MCNC: 45 NG/ML (ref 26–388)
HBA1C MFR BLD: 5.8 % (ref 0–5.6)

## 2022-06-10 PROCEDURE — 99396 PREV VISIT EST AGE 40-64: CPT | Performed by: FAMILY MEDICINE

## 2022-06-10 PROCEDURE — 36415 COLL VENOUS BLD VENIPUNCTURE: CPT | Performed by: FAMILY MEDICINE

## 2022-06-10 PROCEDURE — 82728 ASSAY OF FERRITIN: CPT | Performed by: FAMILY MEDICINE

## 2022-06-10 PROCEDURE — 99215 OFFICE O/P EST HI 40 MIN: CPT | Mod: 25 | Performed by: FAMILY MEDICINE

## 2022-06-10 PROCEDURE — 82550 ASSAY OF CK (CPK): CPT | Performed by: FAMILY MEDICINE

## 2022-06-10 PROCEDURE — 83036 HEMOGLOBIN GLYCOSYLATED A1C: CPT | Performed by: FAMILY MEDICINE

## 2022-06-10 PROCEDURE — 86140 C-REACTIVE PROTEIN: CPT | Performed by: FAMILY MEDICINE

## 2022-06-10 RX ORDER — METFORMIN HCL 500 MG
TABLET, EXTENDED RELEASE 24 HR ORAL
Qty: 90 TABLET | Refills: 1 | Status: SHIPPED | OUTPATIENT
Start: 2022-06-10 | End: 2023-01-05

## 2022-06-10 RX ORDER — MELOXICAM 15 MG/1
7.5-15 TABLET ORAL DAILY
Qty: 30 TABLET | Refills: 3 | Status: SHIPPED | OUTPATIENT
Start: 2022-06-10 | End: 2022-07-18

## 2022-06-10 RX ORDER — CHLORTHALIDONE 25 MG/1
25 TABLET ORAL DAILY
Qty: 90 TABLET | Refills: 1 | Status: SHIPPED | OUTPATIENT
Start: 2022-06-10 | End: 2023-01-05

## 2022-06-10 RX ORDER — TRIAMCINOLONE ACETONIDE 1 MG/G
CREAM TOPICAL 2 TIMES DAILY
Qty: 30 G | Refills: 1 | Status: SHIPPED | OUTPATIENT
Start: 2022-06-10 | End: 2022-11-14

## 2022-06-10 RX ORDER — ATENOLOL 50 MG/1
50 TABLET ORAL DAILY
Qty: 90 TABLET | Refills: 1 | Status: SHIPPED | OUTPATIENT
Start: 2022-06-10 | End: 2023-01-05

## 2022-06-10 ASSESSMENT — ENCOUNTER SYMPTOMS
PALPITATIONS: 0
PARESTHESIAS: 0
HEMATOCHEZIA: 0
DIZZINESS: 0
SHORTNESS OF BREATH: 0
NERVOUS/ANXIOUS: 0
HEMATURIA: 0
DIARRHEA: 0
JOINT SWELLING: 1
CHILLS: 0
ABDOMINAL PAIN: 0
FREQUENCY: 0
NAUSEA: 0
FEVER: 0
EYE PAIN: 0
HEADACHES: 0
DYSURIA: 0
MYALGIAS: 1
ARTHRALGIAS: 1
CONSTIPATION: 0
WEAKNESS: 0
COUGH: 0
SORE THROAT: 0
HEARTBURN: 0

## 2022-06-10 ASSESSMENT — PAIN SCALES - GENERAL: PAINLEVEL: MILD PAIN (3)

## 2022-06-10 NOTE — PATIENT INSTRUCTIONS
Take meloxicam daily for 1-2 weeks to calm this down.  Do not take ibuprofen or naproxen along with this.  Tylenol is OK.      If not improving, we can get you in with PT or sports medicine.  We could also consider a steroid injection.    Use the triamcinolone cream for your rash.  If the two spots we talked about aren't improving, we should remove these within a few months.      Consider resuming your aspirin therapy.    Can try magnesium for your restless legs.      We'll let you know your lab results as soon as we can.     Contact us or return if questions or concerns.     Have a nice day!    Dr. Jolley       Preventive Health Recommendations  Male Ages 50 - 64    Yearly exam:             See your health care provider every year in order to  o   Review health changes.   o   Discuss preventive care.    o   Review your medicines if your doctor has prescribed any.   Have a cholesterol test every 5 years, or more frequently if you are at risk for high cholesterol/heart disease.   Have a diabetes test (fasting glucose) every three years. If you are at risk for diabetes, you should have this test more often.   Have a colonoscopy at age 50, or have a yearly FIT test (stool test). These exams will check for colon cancer.    Talk with your health care provider about whether or not a prostate cancer screening test (PSA) is right for you.  You should be tested each year for STDs (sexually transmitted diseases), if you re at risk.     Shots: Get a flu shot each year. Get a tetanus shot every 10 years.     Nutrition:  Eat at least 5 servings of fruits and vegetables daily.   Eat whole-grain bread, whole-wheat pasta and brown rice instead of white grains and rice.   Get adequate Calcium and Vitamin D.     Lifestyle  Exercise for at least 150 minutes a week (30 minutes a day, 5 days a week). This will help you control your weight and prevent disease.   Limit alcohol to one drink per day.   No smoking.   Wear sunscreen to prevent  skin cancer.   See your dentist every six months for an exam and cleaning.   See your eye doctor every 1 to 2 years.

## 2022-06-10 NOTE — LETTER
[facility]  [address]  [place] [address]  Phone: [phone]    Karolyn 10, 2022        Rajinder Siegel  [address] [place] [facility] [address]          To whom it may concern:    RE: Rajinder Siegel    Patient was seen and treated today at our clinic and missed work.    He will need some time off work to recover from his current knee pain.    Please contact me for questions or concerns.      Sincerely,        Marco Jolley MD

## 2022-06-10 NOTE — PROGRESS NOTES
SUBJECTIVE:   CC: Rajinder Siegel is an 57 year old male who presents for preventative health visit.       Patient has been advised of split billing requirements and indicates understanding: Yes  Healthy Habits:     Getting at least 3 servings of Calcium per day:  Yes    Bi-annual eye exam:  NO    Dental care twice a year:  NO    Sleep apnea or symptoms of sleep apnea:  Sleep apnea    Diet:  Regular (no restrictions)    Frequency of exercise:  2-3 days/week    Duration of exercise:  15-30 minutes    Taking medications regularly:  Yes    Medication side effects:  Muscle aches    PHQ-2 Total Score: 2    Additional concerns today:  Yes          Joint or Musculoskeletal Pain  Duration of complaint: 4 days ago   Description:   Location: right knee  Character: Sharp, Dull ache and Stabbing  Intensity: moderate, can be severe at times  Progression of Symptoms: worse  Accompanying Signs & Symptoms: Other symptoms: swelling  History: Previous similar pain: YES- other knee when had meniscus tare    Precipitating factors: Trauma or overuse: no  Alleviating factors: Improved by: heat and ice  Therapies Tried and outcome: heat and ice but it is still there, ib profane when it gets bad.    Monday night to Tuesday morning, pt noted some medial right knee pain on awakening to get up to urinate.  No trauma, just present on waking up.  He went to work that day, did OK.  Just standing at work, but was hurting more over the course of the day.  Wednesday, went to work, but was hurting more.  Much worse that night.      By Wednesday night, hurt so much he couldn't lift his leg.      Wasn't able to work yesterday or today.  Hasn't really improved since being off it.  Took some ibuprofen yesterday.  Minimal improvement with this.  Pain hasn't changed, but is worse.  Feels like a dull ache when he's sitting, but becomes more sharp upon standing.  Swelling has increased over the last couple of days.      Pt was running up the stairs at his  2nd job a couple months ago.  That night, started to have muscle spasms and pain in his legs.  This is more predominant at night, but happens during the day.      Today's PHQ-2 Score:   PHQ-2 ( 1999 [employer]) 6/9/2022   Q1: Little interest or pleasure in doing things 1   Q2: Feeling down, depressed or hopeless 1   PHQ-2 Score 2   PHQ-2 Total Score (12-17 Years)- Positive if 3 or more points; Administer PHQ-A if positive -   Q1: Little interest or pleasure in doing things Several days   Q2: Feeling down, depressed or hopeless Several days   PHQ-2 Score 2   Some encounter information is confidential and restricted. Go to Review Flowsheets activity to see all data.     Has a new job, having to move.  Under lots of stress.          Abuse: Current or Past(Physical, Sexual or Emotional)- No  Do you feel safe in your environment? Yes    Have you ever done Advance Care Planning? (For example, a Health Directive, POLST, or a discussion with a medical provider or your loved ones about your wishes): No, advance care planning information given to patient to review.  Patient declined advance care planning discussion at this time.    Social History     Tobacco Use     Smoking status: Never Smoker     Smokeless tobacco: Never Used     Tobacco comment: 9/7/04 no second hand smoke at home or work   Substance Use Topics     Alcohol use: No     If you drink alcohol do you typically have >3 drinks per day or >7 drinks per week? Not applicable    Alcohol Use 6/9/2022   Prescreen: >3 drinks/day or >7 drinks/week? Not Applicable       Last PSA: No results found for: PSA    Reviewed orders with patient. Reviewed health maintenance and updated orders accordingly - Yes  BP Readings from Last 3 Encounters:   06/10/22 128/82   01/10/14 124/70   10/09/13 127/83    Wt Readings from Last 3 Encounters:   06/10/22 97.1 kg (214 lb)   01/10/14 102.2 kg (225 lb 5 oz)   08/27/13 104.8 kg (231 lb 0.7 oz)                  Patient Active Problem List    Diagnosis     Essential hypertension, benign     Hypertensive retinopathy     FAMILY HISTORY OF CARDIOVASC DIS (ISCHEMIC)     Mixed hyperlipidemia     Obstructive sleep apnea     Glucose intolerance (impaired glucose tolerance)     Rash of hand     Past Surgical History:   Procedure Laterality Date     DECOMPRESSION LUMBAR ONE LEVEL  3/27/2013    Procedure: DECOMPRESSION LUMBAR ONE LEVEL;  Laminotomy Discectomy L4-5;  Surgeon: Sergei Abarca MD;  Location: [facility] OR      LAPAROSCOPY, SURGICAL; CHOLECYSTECTOMY  6/5/08     MANDIBLE SURGERY       TONSILLECTOMY         Social History     Tobacco Use     Smoking status: Never Smoker     Smokeless tobacco: Never Used     Tobacco comment: 9/7/04 no second hand smoke at home or work   Substance Use Topics     Alcohol use: No     Family History   Problem Relation Age of Onset     Allergies Sister      Allergies Brother      Cancer Brother         Skin cancer     Cerebrovascular Disease Paternal Grandmother      C.A.D. Brother         stent  at age 42     Diabetes Mother      Alzheimer Disease Father      Cancer Father         Skin Cancer     Prostate Cancer No family hx of      Cancer - colorectal No family hx of      Breast Cancer No family hx of          Current Outpatient Medications   Medication Sig Dispense Refill     atenolol (TENORMIN) 50 MG tablet Take 1 tablet by mouth once daily 30 tablet 0     chlorthalidone (HYGROTON) 25 MG tablet Take 1 tablet by mouth once daily 30 tablet 0     metFORMIN (GLUCOPHAGE XR) 500 MG 24 hr tablet TAKE 1 TABLET BY MOUTH ONCE DAILY WITH SUPPER 30 tablet 0     ORDER FOR DME     Certification: Refill           Provide replacement interface, tubing and filter supplies as needed    Accessories:Chin strap, add in future at patient's request      Duration of need: 15 months  Diagnosis: RACHEAL  Instruction to vendor: Please provide patient with mask interface of patient's choice 1 each 0     rosuvastatin (CRESTOR) 10 MG tablet Take 1  tablet by mouth once daily 90 tablet 0     aspirin 81 MG tablet Take by mouth daily (Patient not taking: Reported on 7/15/2021) 100 tablet 3     Allergies   Allergen Reactions     Mold Other (See Comments)     hayfever symptoms     Ragweeds Other (See Comments)     hayfever symptoms     Recent Labs   Lab Test 02/02/22  0850 07/08/21  0924 01/19/21  0905 01/13/21  1006   A1C 6.2* 6.0* 6.0*  --    LDL 37  --   --  40   HDL 32*  --   --  35*   TRIG 163*  --   --  209*   ALT 39  --   --  48   CR 0.91  --   --  0.89   GFRESTIMATED >90  --   --  >90   GFRESTBLACK  --   --   --  >90   POTASSIUM 3.7  --   --  3.4   TSH 2.40  --   --   --         Reviewed and updated as needed this visit by clinical staff   Tobacco  Allergies  Meds      Soc Hx          Reviewed and updated as needed this visit by Provider                       Review of Systems   Constitutional: Negative for chills and fever.   HENT: Negative for congestion, ear pain, hearing loss and sore throat.    Eyes: Negative for pain and visual disturbance.   Respiratory: Negative for cough and shortness of breath.    Cardiovascular: Positive for peripheral edema. Negative for chest pain and palpitations.   Gastrointestinal: Negative for abdominal pain, constipation, diarrhea, heartburn, hematochezia and nausea.   Genitourinary: Positive for urgency. Negative for dysuria, frequency, genital sores, hematuria, impotence and penile discharge.   Musculoskeletal: Positive for arthralgias, joint swelling and myalgias.   Skin: Positive for rash.   Neurological: Negative for dizziness, weakness, headaches and paresthesias.   Psychiatric/Behavioral: Negative for mood changes. The patient is not nervous/anxious.      Takes saw palmetto for his urinary urgency.  Content with this.      OBJECTIVE:   /82   Pulse 73   Temp 98.5  F (36.9  C) (Temporal)   Wt 97.1 kg (214 lb)   SpO2 97%   BMI 30.71 kg/m      Physical Exam  GENERAL: healthy, alert and no distress  EYES:  Eyes grossly normal to inspection, PERRL and conjunctivae and sclerae normal  HENT: ear canals and TM's normal, nose and mouth without ulcers or lesions  NECK: no adenopathy, no asymmetry, masses, or scars and thyroid normal to palpation  RESP: lungs clear to auscultation - no rales, rhonchi or wheezes  CV: regular rate and rhythm, normal S1 S2, no S3 or S4, no murmur, click or rub, no peripheral edema and peripheral pulses strong  ABDOMEN: soft, nontender, no hepatosplenomegaly, no masses and bowel sounds normal  MS: swelling and tenderness of right knee, just superior to patella.  No joint line tenderness.  Pain with ROM, especially with lateral meniscal impingement.  SKIN: no suspicious lesions or rashes  NEURO: Normal strength and tone, mentation intact and speech normal  PSYCH: mentation appears normal, affect normal/bright    Diagnostic Test Results:  Labs reviewed in Epic  No results found for this or any previous visit (from the past 24 hour(s)).  No results found for any visits on 06/10/22.    ASSESSMENT/PLAN:       ICD-10-CM    1. Routine general medical examination at a health care facility  Z00.00    2. Acute pain of right knee  M25.561 XR Knee Right 3 Views     CK total     CRP, inflammation     Ferritin     meloxicam (MOBIC) 15 MG tablet     CK total     CRP, inflammation     Ferritin   3. Intrinsic eczema  L20.84 triamcinolone (KENALOG) 0.1 % external cream   4. Papule of skin  R23.8    5. Onychomycosis  B35.1    6. Glucose intolerance (impaired glucose tolerance)  R73.02 HEMOGLOBIN A1C     metFORMIN (GLUCOPHAGE XR) 500 MG 24 hr tablet     HEMOGLOBIN A1C   7. Restless leg syndrome  G25.81 Ferritin     Ferritin   8. Myalgia  M79.10 CK total     CRP, inflammation     CK total     CRP, inflammation   9. Essential hypertension, benign  I10 chlorthalidone (HYGROTON) 25 MG tablet     atenolol (TENORMIN) 50 MG tablet     1.  Reviewed recommended screenings and ordered appropriate testing for pt's risks and  per pt's request(s).   2.  Unclear etiology.  X-ray is not particularly revealing.  Suspected tendinitis.  Trial of meloxicam.  We will also check some screening labs to look for myopathy or other inflammatory process.  If not responding to NSAIDs, will consider steroid injection, referral to sports medicine, or MRI.  3.  Clinically consistent with this.  Trial of triamcinolone cream.  If not improving, may need to consider biopsy especially for the lesions noted below.  4.  1 is associated with his eczematous rash.  The other is on his contralateral hand.  These may be benign, but I am worried that these are slightly pearly and may be poorly healing wounds.  May need to rule out neoplasm.  Follow-up if not improving.  Would biopsy.  5.  Discussed the nature of this and options for treatment.  Patient will consider.  6.  Due for recheck of labs.  We will continue metformin and encourage lifestyle modifications.  7.  Clinically consistent with this.  Trial of magnesium while awaiting ferritin levels.  If iron deficient, will replace iron.  8.  Unclear etiology.  We will check some labs to further evaluate.  9.  Clinically controlled.  Will continue current regimen.    Portions of this note were completed using Dragon dictation software.  Although reviewed, there may be typographical and other inadvertent errors that remain.     In addition to the preventive health portion of the visit, I spent 40 minutes discussing patient's other medical problems and treatment approaches with patient.  This time also includes preparation and documentation time associated with the visit, day of service.       COUNSELING:   Reviewed preventive health counseling, as reflected in patient instructions       Regular exercise       Healthy diet/nutrition       Aspirin prophylaxis        Osteoporosis prevention/bone health       The ASCVD Risk score (Kasie MACKENZIE Jr., et al., 2013) failed to calculate for the following reasons:    The valid  "total cholesterol range is 130 to 320 mg/dL    Estimated body mass index is 30.71 kg/m  as calculated from the following:    Height as of 1/10/14: 1.778 m (5' 10\").    Weight as of this encounter: 97.1 kg (214 lb).     Weight management plan: Discussed healthy diet and exercise guidelines    He reports that he has never smoked. He has never used smokeless tobacco.      Counseling Resources:  ATP IV Guidelines  Pooled Cohorts Equation Calculator  FRAX Risk Assessment  ICSI Preventive Guidelines  Dietary Guidelines for Americans, 2010  USDA's MyPlate  ASA Prophylaxis  Lung CA Screening    Marco Jolley MD, MD  [facility]    Patient Instructions   Take meloxicam daily for 1-2 weeks to calm this down.  Do not take ibuprofen or naproxen along with this.  Tylenol is OK.      If not improving, we can get you in with PT or sports medicine.  We could also consider a steroid injection.    Use the triamcinolone cream for your rash.  If the two spots we talked about aren't improving, we should remove these within a few months.      Consider resuming your aspirin therapy.    Can try magnesium for your restless legs.      We'll let you know your lab results as soon as we can.     "

## 2022-07-08 ENCOUNTER — TELEPHONE (OUTPATIENT)
Dept: FAMILY MEDICINE | Facility: OTHER | Age: 57
End: 2022-07-08

## 2022-07-08 NOTE — TELEPHONE ENCOUNTER
Reason for Call:  Other appointment    Detailed comments: Rajinder needs to have a lesion removed from hand, your next available opening for a removal is in September, he is wondering if it's ok to wait until September to have the lesion removed? He would like a reply through his mychart, he said he doesn't answer his telephone all the time.    Phone Number Patient can be reached at: Other phone number:  Please communicate through PCA Audit*    Best Time: mornings work best for him    Can we leave a detailed message on this number? Not Applicable    Call taken on 7/8/2022 at 11:16 AM by Susan Osorio

## 2022-07-18 ENCOUNTER — OFFICE VISIT (OUTPATIENT)
Dept: FAMILY MEDICINE | Facility: OTHER | Age: 57
End: 2022-07-18
Payer: COMMERCIAL

## 2022-07-18 VITALS
OXYGEN SATURATION: 97 % | HEIGHT: 70 IN | DIASTOLIC BLOOD PRESSURE: 72 MMHG | TEMPERATURE: 97.6 F | RESPIRATION RATE: 18 BRPM | BODY MASS INDEX: 30.28 KG/M2 | HEART RATE: 64 BPM | WEIGHT: 211.5 LBS | SYSTOLIC BLOOD PRESSURE: 120 MMHG

## 2022-07-18 DIAGNOSIS — Z23 NEED FOR VACCINATION: ICD-10-CM

## 2022-07-18 DIAGNOSIS — L82.0 INFLAMED SEBORRHEIC KERATOSIS: Primary | ICD-10-CM

## 2022-07-18 PROCEDURE — 17110 DESTRUCTION B9 LES UP TO 14: CPT | Performed by: FAMILY MEDICINE

## 2022-07-18 ASSESSMENT — PAIN SCALES - GENERAL: PAINLEVEL: NO PAIN (0)

## 2022-07-18 NOTE — PATIENT INSTRUCTIONS
Thank you for visiting [facility]    These may blister up and should fall off in 1-2 weeks.  Let me know if problems.      If not going away, we should re-visit the idea of biopsy.      Contact us or return if questions or concerns.     Have a nice day!    Dr. Jolley     Return in about 4 weeks (around 8/15/2022), or if symptoms worsen or fail to improve.      If you need medication refills, please contact your pharmacy 3 days before your prescriptions runs out or download the [facility] gulshan for your smart phone. If you are out of refills, your pharmacy will contact contact the clinic.                                     MyChart Assistance [phone]

## 2022-07-18 NOTE — PROGRESS NOTES
Assessment & Plan     Inflamed seborrheic keratosis  Lesions were treated with 2 cycles of liquid nitrogen.  Patient tolerated procedure well.  The lesion on the right arm is quite large, so treatment was subtotal.  If not responding as anticipated, would have low threshold for biopsy to ensure that this is not a neoplasm.  - DESTRUCT BENIGN LESION, UP TO 14    Need for vaccination  Discussed COVID versus shingles vaccination today.  Patient elected to defer on both, but he is most open to shingles vaccination.  He was just concerned about side effects with his physical work.      Ordering of each unique test  Prescription drug management         Patient Instructions   Thank you for visiting [facility]    These may blister up and should fall off in 1-2 weeks.  Let me know if problems.      If not going away, we should re-visit the idea of biopsy.      Contact us or return if questions or concerns.     Have a nice day!    Dr. Jolley     Return in about 4 weeks (around 8/15/2022), or if symptoms worsen or fail to improve.      If you need medication refills, please contact your pharmacy 3 days before your prescriptions runs out or download the [facility] gulshan for your smart phone. If you are out of refills, your pharmacy will contact contact the clinic.                                     MyChart Assistance [phone]                    Return in about 4 weeks (around 8/15/2022), or if symptoms worsen or fail to improve.    Marco Jolley MD, MD  [facility] ADITI Calvillo is a 57 year old, presenting for the following health issues:  Lesion Removal    Patient is here for a lesion removal on left hand.    HPI     After he scheduled the excision, this is gradually decreased in size.  It had previously been a stable pearly papule for months.  He does have a thickened lesion on his right arm as well, but this has been more stable over time.      Review of  "Systems         Objective    /72 (Cuff Size: Adult Large)   Pulse 64   Temp 97.6  F (36.4  C) (Temporal)   Resp 18   Ht 1.778 m (5' 10\")   Wt 95.9 kg (211 lb 8 oz)   SpO2 97%   BMI 30.35 kg/m    Body mass index is 30.35 kg/m .  Physical Exam   GENERAL: healthy, alert and no distress  SKIN: Grossly and by dermoscopy, lesion on his hand and his arm are both most consistent with seborrheic keratoses.  These have been irritated lately.  Discussed possible cryotherapy to help treat these.  Patient was amenable to that.  Lesions were frozen with 2 cycles of liquid nitrogen.  Patient tolerated procedure well.  Discussed that if these recur we should consider biopsy at that time.    Office Visit on 06/10/2022   Component Date Value Ref Range Status     Hemoglobin A1C 06/10/2022 5.8 (A) 0.0 - 5.6 % Final    Normal <5.7%   Prediabetes 5.7-6.4%    Diabetes 6.5% or higher     Note: Adopted from ADA consensus guidelines.     CK 06/10/2022 93  30 - 300 U/L Final     CRP Inflammation 06/10/2022 <2.9  0.0 - 8.0 mg/L Final     Ferritin 06/10/2022 45  26 - 388 ng/mL Final                   .  ..  "

## 2022-08-19 ENCOUNTER — TELEPHONE (OUTPATIENT)
Dept: FAMILY MEDICINE | Facility: OTHER | Age: 57
End: 2022-08-19

## 2022-08-19 NOTE — TELEPHONE ENCOUNTER
Pt was told to f/u in 4 weeks from last visit for skin  Lesions. Nothing open until October. Needs mid morning.    Ok to offer virtual spot next thur 25th for F2F visit?

## 2022-08-22 DIAGNOSIS — E78.2 MIXED HYPERLIPIDEMIA: ICD-10-CM

## 2022-08-25 ENCOUNTER — OFFICE VISIT (OUTPATIENT)
Dept: FAMILY MEDICINE | Facility: OTHER | Age: 57
End: 2022-08-25
Payer: COMMERCIAL

## 2022-08-25 VITALS
WEIGHT: 210 LBS | SYSTOLIC BLOOD PRESSURE: 118 MMHG | TEMPERATURE: 97.3 F | RESPIRATION RATE: 18 BRPM | HEART RATE: 61 BPM | BODY MASS INDEX: 30.13 KG/M2 | OXYGEN SATURATION: 97 % | DIASTOLIC BLOOD PRESSURE: 62 MMHG

## 2022-08-25 DIAGNOSIS — L98.9 SKIN LESION OF RIGHT ARM: ICD-10-CM

## 2022-08-25 PROCEDURE — 11301 SHAVE SKIN LESION 0.6-1.0 CM: CPT | Performed by: FAMILY MEDICINE

## 2022-08-25 PROCEDURE — 88305 TISSUE EXAM BY PATHOLOGIST: CPT | Performed by: DERMATOLOGY

## 2022-08-25 RX ORDER — ROSUVASTATIN CALCIUM 10 MG/1
TABLET, COATED ORAL
Qty: 90 TABLET | Refills: 0 | Status: SHIPPED | OUTPATIENT
Start: 2022-08-25 | End: 2022-11-28

## 2022-08-25 ASSESSMENT — PAIN SCALES - GENERAL: PAINLEVEL: NO PAIN (1)

## 2022-08-25 NOTE — PROGRESS NOTES
Assessment & Plan     Skin lesion of right arm  Discussed option of repeat cryotherapy today.  Patient was understood in pursuing that.  He is concerned this is something more sinister.  Given the atypical appearance, discussed possible shave excision.  Discussed risks, benefits, and alternatives.  Patient wished to proceed.  After consent, the area on his right arm was anesthetized using 1% lidocaine with epinephrine via local infiltration.  Anesthesia was excellent.  An approximately 1 cm shave excision was performed and the base was cauterized with Hyfrecator.  Pulse was minimal.  The wound was dressed.  Biopsy sample was sent to pathology for evaluation.  Discussed wound care with patient.  Further management based upon pathology results.  - Dermatological Path Order and Indications; Standing  - Dermatological Path Order and Indications    Portions of this note were completed using Dragon dictation software.  Although reviewed, there may be typographical and other inadvertent errors that remain.         Ordering of each unique test  Prescription drug management         See Patient Instructions    Return in about 4 weeks (around 9/22/2022), or if symptoms worsen or fail to improve.    Marco Jolley MD, MD  [facility] is a 57 year old, presenting for the following health issues:  Follow Up      History of Present Illness       Reason for visit:  Follow up on lesion removal    He eats 2-3 servings of fruits and vegetables daily.He consumes 0 sweetened beverage(s) daily.He exercises with enough effort to increase his heart rate 10 to 19 minutes per day.  He exercises with enough effort to increase his heart rate 3 or less days per week.   He is taking medications regularly.      SKs  Onset/Duration: It was removed about 5 weeks ago, but seems to be growing back  Description (location/number): upper extremeties  Accompanying signs and symptoms (pain, redness):  No  History: prior warts: YES  Therapies tried and outcome: liquid nitrogen    The lesion on his hand has largely resolved.  He still feels a little something in his skin.      The lesion on his right arm dramatically improved, but has also started to recur.              Review of Systems         Objective    /62 (Cuff Size: Adult Regular)   Pulse 61   Temp 97.3  F (36.3  C) (Temporal)   Resp 18   Wt 95.3 kg (210 lb)   SpO2 97%   BMI 30.13 kg/m    Body mass index is 30.13 kg/m .  Physical Exam   GENERAL: healthy, alert and no distress  SKIN: Lesion on left hand appears flat, malignant.  I do not appreciate any small nodule.  Patient does feel this was getting smaller.  Lesion on the right arm is not significantly improved from before.  There is a irregularly raised papular patch that does look somewhat suspicious for a seborrheic keratosis, but very atypical in appearance.    Office Visit on 06/10/2022   Component Date Value Ref Range Status     Hemoglobin A1C 06/10/2022 5.8 (A) 0.0 - 5.6 % Final    Normal <5.7%   Prediabetes 5.7-6.4%    Diabetes 6.5% or higher     Note: Adopted from ADA consensus guidelines.     CK 06/10/2022 93  30 - 300 U/L Final     CRP Inflammation 06/10/2022 <2.9  0.0 - 8.0 mg/L Final     Ferritin 06/10/2022 45  26 - 388 ng/mL Final                   .  ..

## 2022-08-25 NOTE — PATIENT INSTRUCTIONS
Wound Care Instructions    1.  Keep Vaseline or antibiotic ointment on wound for a few days until the skin edges have come together.  Do not let it dry out and form a scab as this will make the resulting scar more noticeable.    2.  After 24 hours, may wash gently with soap and water.  After 48 hours, you can soak it, if needed.    3.  If desired, vitamin E oil for 2 weeks after antibiotic ointment may help to decrease scarring.    4.  Protect the area from sun for up to one year afterward as the scar is continuing to remodel.  Sun exposure will also make the resulting scar more noticeable.    5.  Call if the area is very red, tender, has a discharge or is very itchy while healing, or if you have any other questions.  These may be signs of early infection or allergy.

## 2022-08-29 LAB
PATH REPORT.COMMENTS IMP SPEC: NORMAL
PATH REPORT.COMMENTS IMP SPEC: NORMAL
PATH REPORT.FINAL DX SPEC: NORMAL
PATH REPORT.GROSS SPEC: NORMAL
PATH REPORT.MICROSCOPIC SPEC OTHER STN: NORMAL
PATH REPORT.RELEVANT HX SPEC: NORMAL

## 2022-08-30 NOTE — RESULT ENCOUNTER NOTE
Rajinder,    Your lesion is not cancerous and does not require further treatment if it is not bothering you.  If it is bothering you, we can treat with freezing or creams.    Have a nice day!    Dr. Jolley

## 2022-09-19 ENCOUNTER — MYC MEDICAL ADVICE (OUTPATIENT)
Dept: FAMILY MEDICINE | Facility: OTHER | Age: 57
End: 2022-09-19

## 2022-09-19 DIAGNOSIS — G47.33 OSA ON CPAP: Primary | ICD-10-CM

## 2022-11-01 ENCOUNTER — VIRTUAL VISIT (OUTPATIENT)
Dept: SLEEP MEDICINE | Facility: CLINIC | Age: 57
End: 2022-11-01
Attending: FAMILY MEDICINE
Payer: COMMERCIAL

## 2022-11-01 VITALS — BODY MASS INDEX: 30.06 KG/M2 | WEIGHT: 210 LBS | HEIGHT: 70 IN

## 2022-11-01 DIAGNOSIS — G47.33 OSA ON CPAP: Primary | ICD-10-CM

## 2022-11-01 PROCEDURE — 99204 OFFICE O/P NEW MOD 45 MIN: CPT | Mod: GT | Performed by: NURSE PRACTITIONER

## 2022-11-01 ASSESSMENT — SLEEP AND FATIGUE QUESTIONNAIRES
HOW LIKELY ARE YOU TO NOD OFF OR FALL ASLEEP WHILE SITTING INACTIVE IN A PUBLIC PLACE: SLIGHT CHANCE OF DOZING
HOW LIKELY ARE YOU TO NOD OFF OR FALL ASLEEP WHILE SITTING QUIETLY AFTER LUNCH WITHOUT ALCOHOL: SLIGHT CHANCE OF DOZING
HOW LIKELY ARE YOU TO NOD OFF OR FALL ASLEEP WHILE LYING DOWN TO REST IN THE AFTERNOON WHEN CIRCUMSTANCES PERMIT: HIGH CHANCE OF DOZING
HOW LIKELY ARE YOU TO NOD OFF OR FALL ASLEEP WHILE SITTING AND TALKING TO SOMEONE: MODERATE CHANCE OF DOZING
HOW LIKELY ARE YOU TO NOD OFF OR FALL ASLEEP WHEN YOU ARE A PASSENGER IN A CAR FOR AN HOUR WITHOUT A BREAK: MODERATE CHANCE OF DOZING
HOW LIKELY ARE YOU TO NOD OFF OR FALL ASLEEP WHILE SITTING AND READING: HIGH CHANCE OF DOZING
HOW LIKELY ARE YOU TO NOD OFF OR FALL ASLEEP IN A CAR, WHILE STOPPED FOR A FEW MINUTES IN TRAFFIC: SLIGHT CHANCE OF DOZING
HOW LIKELY ARE YOU TO NOD OFF OR FALL ASLEEP WHILE WATCHING TV: HIGH CHANCE OF DOZING

## 2022-11-01 ASSESSMENT — PAIN SCALES - GENERAL: PAINLEVEL: NO PAIN (0)

## 2022-11-01 NOTE — NURSING NOTE
A comprehensive DME order was placed for new/replacement APAP device,nasal cushion mask and supplies sent to [facility] today.    Follow-up in approximately 2 months after obtaining new APAP device to review download data and use/compliance.    Kendrick Peguero CMA

## 2022-11-01 NOTE — PROGRESS NOTES
Rajinder is a 57 year old who is being evaluated via a billable video visit.      How would you like to obtain your AVS? MyChart  If the video visit is dropped, the invitation should be resent by: Text to cell phone: [phone]  Will anyone else be joining your video visit? Maeghan   Amber MATA        Video-Visit Details    Video Start Time: 10:39 AM    Type of service:  Video Visit    Video End Time:11:13 AM    Originating Location (pt. Location): Home        Distant Location (provider location):  On-site    Platform used for Video Visit: [facility]       Outpatient Sleep Medicine Consultation:      Name: Rajinder Siegel MRN# [id number]   Age: 57 year old YOB: 1965     Date of Consultation: November 1, 2022  Consultation is requested by: Marco Jolley MD  [address] [place] [address] Marco Jolley  Primary care provider: Marco Jolley       Reason for Sleep Consult:     Rajinder Siegel is sent by Marco Jolley for a sleep consultation regarding establish care, Hx RACHEAL on CPAP, needs new machine.    Patient s Reason for visit  Rajinder Siegel main reason for visit: I have been using the same machine for several years. It is displaying error messages  Patient states problem(s) started:    Rajinder Siegel's goals for this visit: New machine           Assessment and Plan:     Summary Sleep Diagnoses:  1. RACHEAL on CPAP  - Adult Sleep Eval & Management Referral  - Comprehensive DME      Comorbid Diagnoses:  1.  HTN  2.  Prediabetes/glucose intolerance  3.  Allergic rhinitis  4.  Low back pain  5.  Hypertensive retinopathy  6.  Obesity      Summary Recommendations:  1.  Overall, patient with moderately severe RACHEAL on CPAP fixed pressure of 6 cm H2O doing well but current device is over 11 years old and message on device indicates end of motor life which is affecting the functioning as the patient is having to unplug the device and reset it frequently and he is worried that  it will stop working.  Patient indicates no significant changes to his health and his body weight is approximately 10 pounds less than his last sleep study.  He does report that he continues to benefit from PAP therapy.  Reevaluation of RACHEAL does not appear to be indicated at this time.  2.  A comprehensive DME order was placed for new/replacement APAP device with empiric pressure setting 5-10 cm H2O, nasal cushion mask and supplies sent to [facility] today.  We discussed usual use/compliance requirements associated with new PAP device therapy.  In addition, the patient was notified that there may be a delay in obtaining his new APAP device due to the ongoing nationwide CPAP supply shortage.  3.  Follow-up in approximately 2 months after obtaining new APAP device to review download data and use/compliance.    Orders Placed This Encounter   Procedures     Comprehensive DME         Summary Counseling:    Sleep Testing Reviewed  Obstructive Sleep Apnea Reviewed  Complications of Untreated Sleep Apnea Reviewed  Previous recent chart notes and lab results reviewed    Medical Decision-making:   Educational materials provided in instructions    Total time spent reviewing medical records, history and physical examination, review of previous testing and interpretation as well as documentation on this date: 45 minutes    CC: Marco Jolley          History of Present Illness:     Rajinder Siegel is a 57-year-old male with a PMH pertinent for HTN, HLD, glucose intolerance, allergic rhinitis, low back pain, hypertensive retinopathy, RACHEAL, and obesity who presents today to establish care for history of moderate RACHEAL on CPAP therapy and to obtain a new PAP machine.  He notes that the motor life expectancy and several error messages are coming on and he fears it will stop working on him. Patient was previously seen by Dr. Sanders with last office visit on 11/23/2011 noted in the chart. He reports that he  uses his CPAP regularly, every night/all night, and gets good benefit from using it.    Past Sleep Evaluations: Yes  Previous Study Results: [facility] - PSG S/N  Date: 11/23/2011.  Weight 220 pounds.  AHI: 28.2/hr. RDI 48.8/hr. O2 lelia 69%.  Time SpO2 </= 88%: 6.3 mins  PLMI: 44.7/hr. 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  Wakes up due to Use the bathroom  He has trouble falling back asleep 0 times a week.   It usually takes 15 minutes to get back to sleep  Patient is usually up at 9:00  Uses alarm: No    Weekends/Non-work Days/All Other Days:  Usually gets into bed at 1:00 am   Takes patient about A few minutes to fall asleep  Patient is usually up at 8:30-9:00  Uses alarm: No    Sleep Need  Patient gets  7 hours sleep on average   Patient thinks he needs about 7-8 hours sleep    Rajinder Siegel prefers to sleep in this position(s): Back;Head Elevated   Patient states they do the following activities in bed: Use phone, computer, or tablet    Naps  Patient takes a purposeful nap 1 times a week and naps are usually 1-2 hours in duration  He feels better after a nap: Yes  He dozes off unintentionally 5 days per week  Patient has had a driving accident or near-miss due to sleepiness/drowsiness: No      SLEEP DISRUPTIONS:    Breathing/Snoring  Patient snores:Yes - off CPAP  Other people complain about his snoring: Yes  Patient has been told he stops breathing in his sleep:Yes - off CPAP  He has issues with the following: Morning mouth dryness;Stuffy nose when you wake up;Getting up to urinate more than  once    Movement:  Patient gets pain, discomfort, with an urge to move:  Yes - knee OA, occasionally jumpy/sciatica  It happens when he is resting:  Yes  It happens more at night:  Yes  Patient has been told he kicks his legs at night:  Yes     Behaviors in Sleep:  Rajinder Siegel has experienced the following behaviors while sleeping:    He has experienced sudden muscle weakness during the day: Yes      Is there anything else you would like your sleep provider to know:  No      CAFFEINE AND OTHER SUBSTANCES:    Patient consumes caffeinated beverages per day:  None  Last caffeine use is usually: N/A  List of any prescribed or over the counter stimulants that patient takes: None  List of any prescribed or over the counter sleep medication patient takes: None  List of previous sleep medications that patient has tried: None  Patient drinks alcohol to help them sleep: No  Patient drinks alcohol near bedtime: No     Alcohol use: None  Nicotine/tobacco use: None  Recreational drug use: None      Family History:  Patient has a family member been diagnosed with a sleep disorder: Yes  Apnea         SCALES:    EPWORTH SLEEPINESS SCALE      Bartlett Sleepiness Scale ( SHIVANI Cabrales  1990-1997[facility]/English - Final version - 21 Nov 07 - [facility] [place].) 11/1/2022   Sitting and reading High chance of dozing   Watching TV High chance of dozing   Sitting, inactive in a public place (e.g. a theatre or a meeting) Slight chance of dozing   As a passenger in a car for an hour without a break Moderate chance of dozing   Lying down to rest in the afternoon when circumstances permit High chance of dozing   Sitting and talking to someone Moderate chance of dozing   Sitting quietly after a lunch without alcohol Slight chance of dozing   In a car, while stopped for a few minutes in traffic Slight chance of dozing   Bartlett Score (MC) 16   Bartlett Score (Sleep) 16         INSOMNIA SEVERITY INDEX (KELLIE)      Insomnia Severity Index  "(KELLIE) 11/1/2022   Difficulty falling asleep 0   Difficulty staying asleep 1   Problems waking up too early 1   How SATISFIED/DISSATISFIED are you with your CURRENT sleep pattern? 2   How NOTICEABLE to others do you think your sleep problem is in terms of impairing the quality of your life? 1   How WORRIED/DISTRESSED are you about your current sleep problem? 1   To what extent do you consider your sleep problem to INTERFERE with your daily functioning (e.g. daytime fatigue, mood, ability to function at work/daily chores, concentration, memory, mood, etc.) CURRENTLY? 2   KELLIE Total Score 8       Guidelines for Scoring/Interpretation:  Total score categories:  0-7 = No clinically significant insomnia   8-14 = Subthreshold insomnia   15-21 = Clinical insomnia (moderate severity)  22-28 = Clinical insomnia (severe)  Used via courtesy of www.[employer].va.gov with permission from Franc Covarrubias PhD., [facility]      STOP BANG score: 5    STOP BANG Questionnaire (  2008, the American Society of Anesthesiologists, Inc. [employer] 11/1/2022   1. Snoring - Do you snore loudly (louder than talking or loud enough to be heard through closed doors)? No   2. Tired - Do you often feel tired, fatigued, or sleepy during daytime? Yes   3. Observed - Has anyone observed you stop breathing during your sleep? Yes   4. Blood pressure - Do you have or are you being treated for high blood pressure? Yes   5. BMI - BMI more than 35 kg/m2? No   6. Age - Age over 50 yr old? Yes   7. Neck circumference - Neck circumference greater than 40 cm? No   8. Gender - Gender male? Yes   STOP BANG Score (MC): 4 (High risk of RACHEAL)   B/P Clinic: (No Data)   BMI Clinic: 30.13         GAD7    No flowsheet data found.      CAGE-AID    No flowsheet data found.    CAGE-AID reprinted with permission from the [place] Medical Journal, SENG Nunez. and DERREK Villalobos, \"Conjoint screening questionnaires for alcohol and drug abuse\" " [place] Medical Journal 94: 135-140, 1995.      PATIENT HEALTH QUESTIONNAIRE-9 (PHQ - 9)    No flowsheet data found.    Developed by Yvrose Jo, Stella Dee, Angel Hayden and colleagues, with an educational alana from [employer] No permission required to reproduce, translate, display or distribute.        Allergies:    Allergies   Allergen Reactions     Mold Other (See Comments)     hayfever symptoms     Ragweeds Other (See Comments)     hayfever symptoms       Medications:    Current Outpatient Medications   Medication Sig Dispense Refill     aspirin 81 MG tablet Take by mouth daily 100 tablet 3     atenolol (TENORMIN) 50 MG tablet Take 1 tablet (50 mg) by mouth daily 90 tablet 1     chlorthalidone (HYGROTON) 25 MG tablet Take 1 tablet (25 mg) by mouth daily 90 tablet 1     metFORMIN (GLUCOPHAGE XR) 500 MG 24 hr tablet TAKE 1 TABLET BY MOUTH ONCE DAILY WITH SUPPER 90 tablet 1     ORDER FOR DME     Certification: Refill           Provide replacement interface, tubing and filter supplies as needed    Accessories:Chin strap, add in future at patient's request      Duration of need: 15 months  Diagnosis: RACHEAL  Instruction to vendor: Please provide patient with mask interface of patient's choice 1 each 0     rosuvastatin (CRESTOR) 10 MG tablet Take 1 tablet by mouth once daily 90 tablet 0     triamcinolone (KENALOG) 0.1 % external cream Apply topically 2 times daily 30 g 1       Problem List:  Patient Active Problem List    Diagnosis Date Noted     FAMILY HISTORY OF CARDIOVASC DIS (ISCHEMIC) 05/05/2008     Priority: High     05/2008- brother with stents at age 42       Essential hypertension, benign 05/29/2001     Priority: High     05/2008- on Atenolol 50 mg       Rash of hand 01/06/2021     Priority: Medium     Glucose intolerance (impaired glucose tolerance) 08/27/2013     Priority: Medium     Obstructive sleep apnea 01/06/2012     Priority: Medium     Mixed hyperlipidemia 11/04/2011      Priority: Medium     Hypertensive retinopathy 04/18/2007     Priority: Medium        Past Medical/Surgical History:  Past Medical History:   Diagnosis Date     Allergic rhinitis due to other allergen 09/29/00    Perennial and seasonal allergic rhinitis-abstract     Glucose intolerance (impaired glucose tolerance) 8/27/2013     Hyperlipidemia      Low back pain     and pain and numbness left leg     Obstructive sleep apnea 1/6/2012     RW RESPIRATORY (ABSTRACTED) 09/29/00    Mild intermittent/persistent asthma     Unspecified essential hypertension 01/98    Abstract     Past Surgical History:   Procedure Laterality Date     DECOMPRESSION LUMBAR ONE LEVEL  03/27/2013    Procedure: DECOMPRESSION LUMBAR ONE LEVEL;  Laminotomy Discectomy L4-5;  Surgeon: Sergei Abarca MD;  Location: [facility] OR     HC LAPAROSCOPY, SURGICAL; CHOLECYSTECTOMY  06/05/2008     MANDIBLE SURGERY       MENISCECTOMY Left      TONSILLECTOMY         Social History:  Social History     Socioeconomic History     Marital status:      Spouse name: Olga     Number of children: 4     Years of education: 14     Highest education level: Not on file   Occupational History     Occupation: customer support tech.     Comment: [employer]     Employer: [employer]   Tobacco Use     Smoking status: Never     Smokeless tobacco: Never     Tobacco comments:     9/7/04 no second hand smoke at home or work   Vaping Use     Vaping Use: Never used   Substance and Sexual Activity     Alcohol use: No     Drug use: No     Sexual activity: Yes     Partners: Female   Other Topics Concern     Parent/sibling w/ CABG, MI or angioplasty before 65F 55M? Not Asked   Social History Narrative     Not on file     Social Determinants of Health     Financial Resource Strain: Not on file   Food Insecurity: Not on file   Transportation Needs: Not on file   Physical Activity: Not on file   Stress: Not on file   Social Connections: Not on file   Intimate Partner  "Violence: Not on file   Housing Stability: Not on file       Family History:  Family History   Problem Relation Age of Onset     Allergies Sister      Allergies Brother      Cancer Brother         Skin cancer     Cerebrovascular Disease Paternal Grandmother      RODRIGOAJESENIA. Brother         stent  at age 42     Diabetes Mother      Alzheimer Disease Father      Cancer Father         Skin Cancer     Prostate Cancer No family hx of      Cancer - colorectal No family hx of      Breast Cancer No family hx of        Review of Systems:  A complete review of systems reviewed by me is negative with the exeption of what has been mentioned in the history of present illness.  In the last TWO WEEKS have you experienced any of the following symptoms?  Fevers: No  Night Sweats: No  Weight Gain: No  Pain at Night: Yes  Double Vision: No  Changes in Vision: No  Difficulty Breathing through Nose: Yes  Sore Throat in Morning: Yes  Dry Mouth in the Morning: Yes  Shortness of Breath Lying Flat: No  Shortness of Breath With Activity: No  Awakening with Shortness of Breath: No  Increased Cough: No  Heart Racing at Night: No  Swelling in Feet or Legs: No  Diarrhea at Night: No  Heartburn at Night: No  Urinating More than Once at Night: Yes  Losing Control of Urine at Night: No  Joint Pains at Night: Yes  Headaches in Morning: Yes  Weakness in Arms or Legs: Yes  Depressed Mood: No  Anxiety: No     Physical Examination:  Vitals: Ht 1.778 m (5' 10\")   Wt 95.3 kg (210 lb)   BMI 30.13 kg/m    BMI= Body mass index is 30.13 kg/m .           GENERAL APPEARANCE: healthy, alert, no distress and cooperative  EYES: Eyes grossly normal to inspection  RESP: Unlabored, easy breathing with normal conversational speech  CV: color normal  NEURO: Alert and oriented x3, mentation intact and speech normal  PSYCH: mentation appears normal and affect normal/bright  Mallampati Class: Not examined  Tonsillar Stage: Not examined         Data: All pertinent previous " laboratory data reviewed     Recent Labs   Lab Test 02/02/22  0850 01/13/21  1006    139   POTASSIUM 3.7 3.4   CHLORIDE 105 103   CO2 31 32   ANIONGAP 4 4   * 141*   BUN 17 11   CR 0.91 0.89   BRENDA 9.0 8.9       Recent Labs   Lab Test 02/02/22  0850   WBC 6.0   RBC 4.76   HGB 14.2   HCT 42.3   MCV 89   MCH 29.8   MCHC 33.6   RDW 13.4          Recent Labs   Lab Test 02/02/22  0850   PROTTOTAL 7.2   ALBUMIN 4.0   BILITOTAL 1.9*   ALKPHOS 63   AST 28   ALT 39       TSH (mU/L)   Date Value   02/02/2022 2.40   06/05/2009 2.55   05/09/2008 1.84       No results found for: UAMP, UBARB, BENZODIAZEUR, UCANN, UCOC, OPIT, UPCP    Iron Saturation Index   Date/Time Value Ref Range Status   05/09/2008 08:35 AM 34 15 - 46 % Final     Ferritin   Date/Time Value Ref Range Status   06/10/2022 11:55 AM 45 26 - 388 ng/mL Final   05/09/2008 08:35  20 - 300 ng/mL Final       No results found for: PH, PHARTERIAL, PO2, [id number], SAT, PCO2, HCO3, BASEEXCESS, DOC, BEB    @LABRCNTIPR(phv:4,pco2v:4,po2v:4,hco3v:4,jose:4,o2per:4)@    Echocardiology: No results found for this or any previous visit (from the past 4320 hour(s)).    Chest x-ray: No results found for this or any previous visit from the past 365 days.      Chest CT: No results found for this or any previous visit from the past 365 days.      PFT: Most Recent [facility] Pulmonary Function Testing    No results found for: 20001  No results found for: 20002  No results found for: 20003  No results found for: 20015  No results found for: 20016  No results found for: 20027  No results found for: 20028  No results found for: 20029  No results found for: 20079  No results found for: 20080  No results found for: 20081  No results found for: 20335  No results found for: 20105  No results found for: 20053  No results found for: 20054  No results found for: 20055      CEE Perry CNP 11/1/2022   Sleep Medicine      This note was written with the assistance of the  Dragon voice-dictation technology software. The final document, although reviewed, may contain errors. For corrections, please contact the office.

## 2022-11-10 ENCOUNTER — TELEPHONE (OUTPATIENT)
Dept: FAMILY MEDICINE | Facility: OTHER | Age: 57
End: 2022-11-10

## 2022-11-10 NOTE — TELEPHONE ENCOUNTER
Spoke with Rajinder and scheduled visit.    Closing encounter.    Sharlene Hopkins RN  [facility] ~ Registered Nurse  Clinic Triage ~ [facility]  November 10, 2022

## 2022-11-10 NOTE — TELEPHONE ENCOUNTER
Still having symptoms of left leg pain and lower back pain. Left leg feels weaker and slightly heavier than right.  This has been ongoing for months.     Gets leg cramps on shin area.  Pain at worst is 9/10 it wakes him up and its like a cramping pain.  When resting pain is at 4/10 dull achy.    Started taking Magnesium but he feels like is isn't helping his cramping.    Right arm is painful on elbow.  He thinks it is Tennis elbow.  Rates pain 10/10.  Some weakness.  Hurts with certain movements.    Lower back was hurting and he went to rub the area and was worried because she states the area was numb. The area was his left lower back area above hip.  Numbness lasted a couple of hours.  He states this made him more concerned.    Could we use your same day spot for Monday?    Sharlene Hopkins RN  [facility] ~ Registered Nurse  Clinic Triage ~ [facility]  November 10, 2022

## 2022-11-14 ENCOUNTER — OFFICE VISIT (OUTPATIENT)
Dept: FAMILY MEDICINE | Facility: OTHER | Age: 57
End: 2022-11-14
Payer: COMMERCIAL

## 2022-11-14 VITALS
OXYGEN SATURATION: 99 % | TEMPERATURE: 97.2 F | HEART RATE: 56 BPM | RESPIRATION RATE: 18 BRPM | SYSTOLIC BLOOD PRESSURE: 122 MMHG | WEIGHT: 213 LBS | BODY MASS INDEX: 30.56 KG/M2 | DIASTOLIC BLOOD PRESSURE: 70 MMHG

## 2022-11-14 DIAGNOSIS — R73.02 GLUCOSE INTOLERANCE (IMPAIRED GLUCOSE TOLERANCE): ICD-10-CM

## 2022-11-14 DIAGNOSIS — J06.9 UPPER RESPIRATORY TRACT INFECTION, UNSPECIFIED TYPE: ICD-10-CM

## 2022-11-14 DIAGNOSIS — M54.42 CHRONIC BILATERAL LOW BACK PAIN WITH LEFT-SIDED SCIATICA: Primary | ICD-10-CM

## 2022-11-14 DIAGNOSIS — M79.672 LEFT FOOT PAIN: ICD-10-CM

## 2022-11-14 DIAGNOSIS — G89.29 CHRONIC BILATERAL LOW BACK PAIN WITH LEFT-SIDED SCIATICA: Primary | ICD-10-CM

## 2022-11-14 DIAGNOSIS — M77.11 LATERAL EPICONDYLITIS OF RIGHT ELBOW: ICD-10-CM

## 2022-11-14 LAB
FLUAV AG SPEC QL IA: NEGATIVE
FLUBV AG SPEC QL IA: NEGATIVE
HBA1C MFR BLD: 6 % (ref 0–5.6)
SARS-COV-2 RNA RESP QL NAA+PROBE: NEGATIVE

## 2022-11-14 PROCEDURE — 87804 INFLUENZA ASSAY W/OPTIC: CPT | Performed by: FAMILY MEDICINE

## 2022-11-14 PROCEDURE — 83036 HEMOGLOBIN GLYCOSYLATED A1C: CPT | Performed by: FAMILY MEDICINE

## 2022-11-14 PROCEDURE — U0003 INFECTIOUS AGENT DETECTION BY NUCLEIC ACID (DNA OR RNA); SEVERE ACUTE RESPIRATORY SYNDROME CORONAVIRUS 2 (SARS-COV-2) (CORONAVIRUS DISEASE [COVID-19]), AMPLIFIED PROBE TECHNIQUE, MAKING USE OF HIGH THROUGHPUT TECHNOLOGIES AS DESCRIBED BY CMS-2020-01-R: HCPCS | Performed by: FAMILY MEDICINE

## 2022-11-14 PROCEDURE — U0005 INFEC AGEN DETEC AMPLI PROBE: HCPCS | Performed by: FAMILY MEDICINE

## 2022-11-14 PROCEDURE — 36415 COLL VENOUS BLD VENIPUNCTURE: CPT | Performed by: FAMILY MEDICINE

## 2022-11-14 PROCEDURE — 99214 OFFICE O/P EST MOD 30 MIN: CPT | Mod: CS | Performed by: FAMILY MEDICINE

## 2022-11-14 ASSESSMENT — ENCOUNTER SYMPTOMS
CHILLS: 1
LIGHT-HEADEDNESS: 0
DIZZINESS: 0
JOINT SWELLING: 0
COUGH: 1
BACK PAIN: 1
SINUS PRESSURE: 1
SHORTNESS OF BREATH: 0
SINUS PAIN: 1
NUMBNESS: 1
HEMATOLOGIC/LYMPHATIC NEGATIVE: 1
BRUISES/BLEEDS EASILY: 0
RHINORRHEA: 1
GASTROINTESTINAL NEGATIVE: 1
PALPITATIONS: 0
SORE THROAT: 1

## 2022-11-14 ASSESSMENT — PAIN SCALES - GENERAL: PAINLEVEL: MILD PAIN (3)

## 2022-11-14 NOTE — PATIENT INSTRUCTIONS
Thank you for visiting [facility]    Let's try PT for your back symptoms.      Use the elbow strap for your tennis elbow.      We'll let you know your lab results as soon as we can.     Try ibuprofen 600 mg 3 times/day for a week for your foot pain.  If not improving, see podiatry as ordered.    Contact us or return if questions or concerns.     Have a nice day!    Dr. Jolley     No follow-ups on file.      If you need medication refills, please contact your pharmacy 3 days before your prescriptions runs out or download the [facility] gulshan for your smart phone. If you are out of refills, your pharmacy will contact contact the clinic.                                     [facility] Assistance [phone]

## 2022-11-14 NOTE — PROGRESS NOTES
Assessment & Plan       ICD-10-CM    1. Chronic bilateral low back pain with left-sided sciatica  M54.42 Physical Therapy Referral    G89.29       2. Lateral epicondylitis of right elbow  M77.11 Physical Therapy Referral      3. Upper respiratory tract infection, unspecified type  J06.9 Influenza A/B antigen     Symptomatic; Yes; 11/11/2022 COVID-19 Virus (Coronavirus) by PCR     Symptomatic; Yes; 11/11/2022 COVID-19 Virus (Coronavirus) by PCR Nose      4. Glucose intolerance (impaired glucose tolerance)  R73.02 HEMOGLOBIN A1C     HEMOGLOBIN A1C      5. Left foot pain  M79.672 Orthopedic  Referral        1. This lower back pain is a chronic issue and numbness of lower left back has since resolved.  Exam is overall reassuring.  Patient agreeable to physical therapy for now.  If not responding, would have low threshold for back imaging.    2.  Clinically consistent with lateral epicondylitis.  Discussed recommended treatment.  Patient given arm band today, endorses improvement with pressure on tendon and with arm band while present in clinic. Encouraged to see PT as well if needed and education done one keeping brace on for activities which aggravate symptoms for several weeks/ symptoms resolve.     3. Given negative COVID test at home he is less likely to be experiencing COVID but would like to test today to be sure. He is currently unvaccinated for Flu and declines vaccination today, is agreeable to test for flu as well. Wife is also sick and was told that she has rhinovirus so this is most likely but we cannot rule out others until tests result.     4.Tolerating metformin 500mg daily. Will monitor today with blood work and make adjustments as needed.     5. Considered neuroma but physical exam negative for pain on metatarsal compression does not support.  May be a tendinitis.  Patient encouraged to swap out current shoes for a  more supportive pair, increase ibuprofen to 600 mg three times a day for 1  "week, and to see podiatry if symptoms persist- order placed today.      Portions of this note were completed using Dragon dictation software.  Although reviewed, there may be typographical and other inadvertent errors that remain.         Ordering of each unique test  Prescription drug management         Patient Instructions   Thank you for visiting [facility]    Let's try PT for your back symptoms.      Use the elbow strap for your tennis elbow.      We'll let you know your lab results as soon as we can.     Try ibuprofen 600 mg 3 times/day for a week for your foot pain.  If not improving, see podiatry as ordered.    Contact us or return if questions or concerns.     Have a nice day!    Dr. Jolley     No follow-ups on file.      If you need medication refills, please contact your pharmacy 3 days before your prescriptions runs out or download the [facility] gulshan for your smart phone. If you are out of refills, your pharmacy will contact contact the clinic.                                     MyChart Assistance [phone]                    Return if symptoms worsen or fail to improve.    Marco Jolley MD, MD  [facility] ADITI Calvillo is a 57 year old, presenting for the following health issues:  Back Pain and Leg Pain      He is here for chronic back pain but noted new numbness and tingling on palpation of his lower back starting four days ago. Nothing new happened that day but regularly is in odd position for his job. Does not have any changes in lower leg numbness and tingling of lower legs but does notice quads are painful when standing up from sitting or squatting. Symptom of numbness and tingling have since resolved but chronic pain remains an issue for him.     States he has \"tennis elbow\" for a few months starting a this summer. Right arm is very pain with certain movements like picking up milk. Right is worse than left but left also had these " symptoms as well for a while. Left is no longer painful. Patient states that he has a specific area of pain from which the pain radiates. When asked to show this student he notes pinpoint of pain on lateral epicondyl.     Left foot is a long standing problem for him. He notes it is worsening when he walks and was told he has a tight achilles. He feels better when he wears his house slippers. When asked he states his shoes are good shoes and that they are only about one year old.     Is using tylenol or ibuprofen for all of this pain. Has not done PT for any of the above issues.     Presents with cough today that started about three days ago. Took a home COVID test which was negative. Endorses post nasal drip, cough, nasal drainage, sinus pressure and a sore throat. Wife also has symptoms but was told she has Rhinovirus. Declines flu shot today. Is ok to test for COVID and flu today.     History of Present Illness       Back Pain:  He presents for follow up of back pain. Patient's back pain is a chronic problem.  Location of back pain:  Left lower back, right side of neck, right shoulder and left hip  Description of back pain: burning  Back pain spreads: left buttocks and left foot    Since patient first noticed back pain, pain is: gradually worsening  Does back pain interfere with his job:  No      He eats 2-3 servings of fruits and vegetables daily.He consumes 0 sweetened beverage(s) daily.He exercises with enough effort to increase his heart rate 9 or less minutes per day.  He exercises with enough effort to increase his heart rate 3 or less days per week.   He is taking medications regularly.       Acute Illness  Acute illness concerns: ongoing URI  Onset/Duration: 3 days  Symptoms:  Fever: No  Chills/Sweats: Yes, intermittent  Headache (location?): No  Sinus Pressure: YES  Conjunctivitis:  No  Ear Pain: no  Rhinorrhea: YES  Congestion: YES  Sore Throat: YES  Cough: YES  Wheeze: No  Decreased Appetite:  No  Nausea: No  Vomiting: No  Diarrhea: No  Dysuria/Freq.: No  Dysuria or Hematuria: No  Fatigue/Achiness: YES  Sick/Strep Exposure: wife is also sick, neg covid test   Therapies tried and outcome: None      Review of Systems   Constitutional: Positive for chills.   HENT: Positive for postnasal drip, rhinorrhea, sinus pressure, sinus pain and sore throat.    Respiratory: Positive for cough. Negative for shortness of breath.    Cardiovascular: Negative for chest pain, palpitations and peripheral edema.   Gastrointestinal: Negative.    Genitourinary: Negative.    Musculoskeletal: Positive for back pain. Negative for joint swelling.   Skin: Negative.    Neurological: Positive for numbness. Negative for dizziness and light-headedness.   Hematological: Negative.  Does not bruise/bleed easily.            Objective    /70   Pulse 56   Temp 97.2  F (36.2  C) (Temporal)   Resp 18   Wt 96.6 kg (213 lb)   SpO2 99%   BMI 30.56 kg/m    Body mass index is 30.56 kg/m .  Physical Exam  Vitals reviewed.   Constitutional:       General: He is not in acute distress.     Appearance: Normal appearance.   HENT:      Head: Normocephalic.      Right Ear: Tympanic membrane normal.      Left Ear: Tympanic membrane normal.      Nose: Rhinorrhea present.      Mouth/Throat:      Mouth: Mucous membranes are dry.      Pharynx: Oropharynx is clear. Posterior oropharyngeal erythema present.   Cardiovascular:      Rate and Rhythm: Normal rate and regular rhythm.      Pulses: Normal pulses.      Heart sounds: Normal heart sounds.   Pulmonary:      Effort: Pulmonary effort is normal.      Breath sounds: Normal breath sounds.   Musculoskeletal:         General: No swelling, deformity or signs of injury.      Right elbow: No swelling, deformity, effusion or lacerations. Tenderness present in lateral epicondyle.      Cervical back: Normal and neck supple.      Thoracic back: Normal.      Left lower leg: No edema.      Right foot: Normal range  of motion. No deformity.      Comments: Left lower back was not tender or numb on palpation   Feet:      Right foot:      Skin integrity: Skin integrity normal.      Toenail Condition: Fungal disease present.     Left foot:      Toenail Condition: Fungal disease present.     Comments: Tenderness between 1st and 2nd   Lymphadenopathy:      Cervical: No cervical adenopathy.   Neurological:      Mental Status: He is alert.      Sensory: Sensory deficit present.      Motor: No weakness.      Coordination: Coordination normal.      Gait: Gait abnormal.      Deep Tendon Reflexes: Reflexes normal.        GENERAL: healthy, alert and no distress  NECK: no adenopathy, no asymmetry, masses, or scars and thyroid normal to palpation  RESP: lungs clear to auscultation - no rales, rhonchi or wheezes  CV: regular rate and rhythm, normal S1 S2, no S3 or S4, no murmur, click or rub, no peripheral edema and peripheral pulses strong  ABDOMEN: soft, nontender, no hepatosplenomegaly, no masses and bowel sounds normal  MS: lateral epicondyle tenderness.  Minimal low back pain on palpation.  Normal strength and sensation on exam.  Pt notes numbness was in left lower back.      Tenderness between 1-2 metatarsals of left foot.      Office Visit on 08/25/2022   Component Date Value Ref Range Status     Case Report 08/25/2022    Final                    Value:Surgical Pathology Report                         Case: [id number]                                  Authorizing Provider:  Marco Jolley MD  Collected:           08/25/2022 11:30 AM          Ordering Location:     [facility]   Received:            08/25/2022 11:41 AM                                 [place]                                                                    Pathologist:           Marco Hodges MD                                                         Specimen:    Skin, Right arm                                                                              Final Diagnosis 08/25/2022    Final                    Value:This result contains rich text formatting which cannot be displayed here.     Clinical Information 08/25/2022    Final                    Value:This result contains rich text formatting which cannot be displayed here.     Gross Description 08/25/2022    Final                    Value:This result contains rich text formatting which cannot be displayed here.     Microscopic Description 08/25/2022    Final                    Value:This result contains rich text formatting which cannot be displayed here.     Performing Labs 08/25/2022    Final                    Value:This result contains rich text formatting which cannot be displayed here.     No results found for any visits on 11/14/22.  No results found for this or any previous visit (from the past 24 hour(s)).    Physician Attestation   I, Marco Jolley MD, MD, was present with the medical/CHERELLE student who participated in the service and in the documentation of the note.  I have verified the history and personally performed the physical exam and medical decision making.  I agree with the assessment and plan of care as documented in the note.      Items personally reviewed: vitals, labs and exam and agree with the interpretation documented in the note.    Marco Jolley MD, MD

## 2022-11-20 ENCOUNTER — HEALTH MAINTENANCE LETTER (OUTPATIENT)
Age: 57
End: 2022-11-20

## 2022-11-25 DIAGNOSIS — E78.2 MIXED HYPERLIPIDEMIA: ICD-10-CM

## 2022-11-28 RX ORDER — ROSUVASTATIN CALCIUM 10 MG/1
TABLET, COATED ORAL
Qty: 90 TABLET | Refills: 0 | Status: SHIPPED | OUTPATIENT
Start: 2022-11-28 | End: 2023-03-01

## 2022-11-28 NOTE — TELEPHONE ENCOUNTER
Prescription approved per [facility] Refill Protocol.  Sindi Quinn RN on 11/28/2022 at 12:37 PM

## 2023-01-04 DIAGNOSIS — R73.02 GLUCOSE INTOLERANCE (IMPAIRED GLUCOSE TOLERANCE): ICD-10-CM

## 2023-01-04 DIAGNOSIS — I10 ESSENTIAL HYPERTENSION, BENIGN: ICD-10-CM

## 2023-01-05 RX ORDER — CHLORTHALIDONE 25 MG/1
TABLET ORAL
Qty: 90 TABLET | Refills: 0 | Status: SHIPPED | OUTPATIENT
Start: 2023-01-05 | End: 2023-02-23 | Stop reason: SINTOL

## 2023-01-05 RX ORDER — ATENOLOL 50 MG/1
TABLET ORAL
Qty: 90 TABLET | Refills: 0 | Status: SHIPPED | OUTPATIENT
Start: 2023-01-05 | End: 2023-04-10

## 2023-01-05 RX ORDER — METFORMIN HCL 500 MG
TABLET, EXTENDED RELEASE 24 HR ORAL
Qty: 90 TABLET | Refills: 0 | Status: SHIPPED | OUTPATIENT
Start: 2023-01-05 | End: 2023-04-10

## 2023-01-07 ENCOUNTER — ANCILLARY PROCEDURE (OUTPATIENT)
Dept: GENERAL RADIOLOGY | Facility: CLINIC | Age: 58
End: 2023-01-07
Attending: INTERNAL MEDICINE
Payer: COMMERCIAL

## 2023-01-07 ENCOUNTER — OFFICE VISIT (OUTPATIENT)
Dept: URGENT CARE | Facility: URGENT CARE | Age: 58
End: 2023-01-07
Payer: COMMERCIAL

## 2023-01-07 VITALS
WEIGHT: 212 LBS | OXYGEN SATURATION: 97 % | DIASTOLIC BLOOD PRESSURE: 79 MMHG | BODY MASS INDEX: 30.42 KG/M2 | HEART RATE: 61 BPM | SYSTOLIC BLOOD PRESSURE: 120 MMHG | RESPIRATION RATE: 16 BRPM | TEMPERATURE: 96.6 F

## 2023-01-07 DIAGNOSIS — M79.675 PAIN OF TOE OF LEFT FOOT: Primary | ICD-10-CM

## 2023-01-07 DIAGNOSIS — M79.675 PAIN OF TOE OF LEFT FOOT: ICD-10-CM

## 2023-01-07 PROCEDURE — 84550 ASSAY OF BLOOD/URIC ACID: CPT | Performed by: INTERNAL MEDICINE

## 2023-01-07 PROCEDURE — 36415 COLL VENOUS BLD VENIPUNCTURE: CPT | Performed by: INTERNAL MEDICINE

## 2023-01-07 PROCEDURE — 99214 OFFICE O/P EST MOD 30 MIN: CPT | Performed by: INTERNAL MEDICINE

## 2023-01-07 PROCEDURE — 73660 X-RAY EXAM OF TOE(S): CPT | Mod: TC | Performed by: RADIOLOGY

## 2023-01-07 RX ORDER — PREDNISONE 20 MG/1
TABLET ORAL
Qty: 26 TABLET | Refills: 0 | Status: SHIPPED | OUTPATIENT
Start: 2023-01-07 | End: 2023-02-11

## 2023-01-07 ASSESSMENT — PAIN SCALES - GENERAL: PAINLEVEL: MODERATE PAIN (4)

## 2023-01-07 NOTE — PROGRESS NOTES
SUBJECTIVE:  Rajinder Siegel is an 58 year old male who presents for left foot pain.  As some on and off foot pain sometimes.  Yesterday pain was much worse.  He did stub his toe about a week ago and hurt and improved but now hurts.  Some swelling at base of big toe and hurts to move toes.  A little redness today.  No fevers, chills, sweats.  No other areas having pain.  Toe hurts enough that he doesn't want a blanket on it when he's in bed and it hurts to put a sock on.    PMH:   has a past medical history of Allergic rhinitis due to other allergen (09/29/00), Glucose intolerance (impaired glucose tolerance) (8/27/2013), Hyperlipidemia, Low back pain, Obstructive sleep apnea (1/6/2012), RW RESPIRATORY (ABSTRACTED) (09/29/00), and Unspecified essential hypertension (01/98).  Patient Active Problem List   Diagnosis     Essential hypertension, benign     Hypertensive retinopathy     FAMILY HISTORY OF CARDIOVASC DIS (ISCHEMIC)     Mixed hyperlipidemia     Obstructive sleep apnea     Glucose intolerance (impaired glucose tolerance)     Rash of hand     Social History     Socioeconomic History     Marital status:      Spouse name: Olga     Number of children: 4     Years of education: 14     Highest education level: None   Occupational History     Occupation: customer support tech.     Comment: [email or web address]     Employer: [employer]   Tobacco Use     Smoking status: Never     Smokeless tobacco: Never     Tobacco comments:     9/7/04 no second hand smoke at home or work   Vaping Use     Vaping Use: Never used   Substance and Sexual Activity     Alcohol use: No     Drug use: No     Sexual activity: Yes     Partners: Female     Family History   Problem Relation Age of Onset     Allergies Sister      Allergies Brother      Cancer Brother         Skin cancer     Cerebrovascular Disease Paternal Grandmother      GUALBERTO. Brother         stent  at age 42     Diabetes Mother      Alzheimer Disease Father       Cancer Father         Skin Cancer     Prostate Cancer No family hx of      Cancer - colorectal No family hx of      Breast Cancer No family hx of        ALLERGIES:  Mold and Ragweeds    Current Outpatient Medications   Medication     atenolol (TENORMIN) 50 MG tablet     chlorthalidone (HYGROTON) 25 MG tablet     metFORMIN (GLUCOPHAGE XR) 500 MG 24 hr tablet     predniSONE (DELTASONE) 20 MG tablet     rosuvastatin (CRESTOR) 10 MG tablet     aspirin 81 MG tablet     ORDER FOR DME     No current facility-administered medications for this visit.         ROS:  ROS is done and is negative for general/constitutional, eye, ENT, Respiratory, cardiovascular, GI, , Skin, musculoskeletal except as noted elsewhere.  All other review of systems negative except as noted elsewhere.      OBJECTIVE:  /79   Pulse 61   Temp (!) 96.6  F (35.9  C) (Tympanic)   Resp 16   Wt 96.2 kg (212 lb)   SpO2 97%   BMI 30.42 kg/m    GENERAL APPEARANCE: Alert, in no acute distress  EYES: normal  RESP: normal and clear to auscultation  CV:regular rate and rhythm and no murmurs, clicks, or gallops  ABDOMEN: Abdomen soft, non-tender. BS normal. No masses, organomegaly  SKIN: no ulcers, lesions or rash  MUSCULOSKELETAL:left foot - at base of big toe there is mild erythema and mild edema which is quite tender, no increased warmth; moderate pain with flexion and extension of toe      RESULTS  Xray left toe- no fracture noted on my reading.  Recent Results (from the past 48 hour(s))   XR Toe Left G/E 2 Views    Narrative    EXAM: XR TOE LEFT G/E 2 VIEWS  LOCATION: [facility]  DATE/TIME: 1/7/2023 2:17 PM    INDICATION:  Pain of toe of left foot  COMPARISON: None.      Impression    IMPRESSION: Degenerative change at the first MTP joint. No evidence for fracture or dislocation.       ASSESSMENT/PLAN:    ASSESSMENT / PLAN:  (M79.675) Pain of toe of left foot  (primary encounter diagnosis)  Comment: currently c/w gout.  No  fracture noted.    Plan: XR Toe Left G/E 2 Views, predniSONE (DELTASONE)        20 MG tablet, Uric acid        Reviewed medication instructions and side effects. Follow up if experiences side effects. Will check uric acid level for future reference as may be helpful if has recurrence, but advised that level can be normal with gout. I reviewed supportive care, otc meds to use if needed, expected course, and signs of concern.  Follow up as needed or if he does not improve within 5 day(s) or if worsens in any way.  Reviewed red flag symptoms and is to go to the ER if experiences any of these. Diet for gout information provided      See [facility] for orders, medications, letters, patient instructions  Time spent: 30 minutes spent during visit, reviewing test results, chart review, and charting on day of encounter  Anayeli Meier M.D.

## 2023-01-09 LAB — URATE SERPL-MCNC: 7.3 MG/DL (ref 3.5–7.2)

## 2023-02-09 ENCOUNTER — OFFICE VISIT (OUTPATIENT)
Dept: FAMILY MEDICINE | Facility: OTHER | Age: 58
End: 2023-02-09
Payer: COMMERCIAL

## 2023-02-09 ENCOUNTER — MYC MEDICAL ADVICE (OUTPATIENT)
Dept: FAMILY MEDICINE | Facility: OTHER | Age: 58
End: 2023-02-09

## 2023-02-09 VITALS
BODY MASS INDEX: 30.35 KG/M2 | WEIGHT: 212 LBS | HEIGHT: 70 IN | TEMPERATURE: 97.2 F | OXYGEN SATURATION: 98 % | RESPIRATION RATE: 16 BRPM | DIASTOLIC BLOOD PRESSURE: 70 MMHG | HEART RATE: 67 BPM | SYSTOLIC BLOOD PRESSURE: 110 MMHG

## 2023-02-09 DIAGNOSIS — E78.2 MIXED HYPERLIPIDEMIA: ICD-10-CM

## 2023-02-09 DIAGNOSIS — Z23 NEED FOR VACCINATION: ICD-10-CM

## 2023-02-09 DIAGNOSIS — R73.02 GLUCOSE INTOLERANCE (IMPAIRED GLUCOSE TOLERANCE): ICD-10-CM

## 2023-02-09 DIAGNOSIS — U09.9 POST-COVID CHRONIC COUGH: Primary | ICD-10-CM

## 2023-02-09 DIAGNOSIS — B35.1 ONYCHOMYCOSIS: ICD-10-CM

## 2023-02-09 DIAGNOSIS — R05.3 POST-COVID CHRONIC COUGH: Primary | ICD-10-CM

## 2023-02-09 DIAGNOSIS — I10 ESSENTIAL HYPERTENSION, BENIGN: ICD-10-CM

## 2023-02-09 LAB
ALBUMIN SERPL BCG-MCNC: 4.3 G/DL (ref 3.5–5.2)
ALP SERPL-CCNC: 66 U/L (ref 40–129)
ALT SERPL W P-5'-P-CCNC: 27 U/L (ref 10–50)
ANION GAP SERPL CALCULATED.3IONS-SCNC: 10 MMOL/L (ref 7–15)
AST SERPL W P-5'-P-CCNC: 34 U/L (ref 10–50)
BILIRUB SERPL-MCNC: 1.4 MG/DL
BUN SERPL-MCNC: 16 MG/DL (ref 8–23)
CALCIUM SERPL-MCNC: 9.4 MG/DL (ref 8.6–10)
CHLORIDE SERPL-SCNC: 101 MMOL/L (ref 98–107)
CHOLEST SERPL-MCNC: 102 MG/DL
CREAT SERPL-MCNC: 0.83 MG/DL (ref 0.67–1.17)
DEPRECATED HCO3 PLAS-SCNC: 28 MMOL/L (ref 22–29)
GFR SERPL CREATININE-BSD FRML MDRD: >90 ML/MIN/1.73M2
GLUCOSE SERPL-MCNC: 126 MG/DL (ref 70–99)
HBA1C MFR BLD: 6.5 % (ref 0–5.6)
HDLC SERPL-MCNC: 23 MG/DL
LDLC SERPL CALC-MCNC: 11 MG/DL
NONHDLC SERPL-MCNC: 79 MG/DL
POTASSIUM SERPL-SCNC: 3.3 MMOL/L (ref 3.4–5.3)
PROT SERPL-MCNC: 7.2 G/DL (ref 6.4–8.3)
SODIUM SERPL-SCNC: 139 MMOL/L (ref 136–145)
TRIGL SERPL-MCNC: 340 MG/DL

## 2023-02-09 PROCEDURE — 36415 COLL VENOUS BLD VENIPUNCTURE: CPT | Performed by: FAMILY MEDICINE

## 2023-02-09 PROCEDURE — 90472 IMMUNIZATION ADMIN EACH ADD: CPT | Performed by: FAMILY MEDICINE

## 2023-02-09 PROCEDURE — 80061 LIPID PANEL: CPT | Performed by: FAMILY MEDICINE

## 2023-02-09 PROCEDURE — 90750 HZV VACC RECOMBINANT IM: CPT | Performed by: FAMILY MEDICINE

## 2023-02-09 PROCEDURE — 99214 OFFICE O/P EST MOD 30 MIN: CPT | Mod: 25 | Performed by: FAMILY MEDICINE

## 2023-02-09 PROCEDURE — 90682 RIV4 VACC RECOMBINANT DNA IM: CPT | Performed by: FAMILY MEDICINE

## 2023-02-09 PROCEDURE — 80053 COMPREHEN METABOLIC PANEL: CPT | Performed by: FAMILY MEDICINE

## 2023-02-09 PROCEDURE — 90471 IMMUNIZATION ADMIN: CPT | Performed by: FAMILY MEDICINE

## 2023-02-09 PROCEDURE — 83036 HEMOGLOBIN GLYCOSYLATED A1C: CPT | Performed by: FAMILY MEDICINE

## 2023-02-09 RX ORDER — TERBINAFINE HYDROCHLORIDE 250 MG/1
250 TABLET ORAL DAILY
Qty: 90 TABLET | Refills: 0 | Status: SHIPPED | OUTPATIENT
Start: 2023-02-09 | End: 2023-05-10

## 2023-02-09 RX ORDER — ALBUTEROL SULFATE 90 UG/1
2 AEROSOL, METERED RESPIRATORY (INHALATION) EVERY 6 HOURS PRN
Qty: 18 G | Refills: 0 | Status: SHIPPED | OUTPATIENT
Start: 2023-02-09 | End: 2023-09-21

## 2023-02-09 ASSESSMENT — PAIN SCALES - GENERAL: PAINLEVEL: NO PAIN (0)

## 2023-02-09 NOTE — TELEPHONE ENCOUNTER
I can probably squeeze in around 4:30-5:00 today.  There may be a wait, depending on how things go with my schedule otherwise.

## 2023-02-09 NOTE — TELEPHONE ENCOUNTER
Pt calls regarding this message. Denies difficulty breathing, chest pain, fever. He states that he has sore muscles due to the coughing. He only coughs because he is trying to clear his throat. Has a lot of sinus congestion/drainage. Covid test was negative at onset of symptoms. Does have chills occasionally.     Pt has tried allergy medication and OTC cough medication. Getting rest and plenty of fluids.     Recommended e-visit for sinus symptoms but pt states that he would like to be seen. He wants to confirm that he does not have pneumonia or other worsening illness.     PCP-are you able to work pt in today or tomorrow?     Sindi Quinn, FEIN, RN, PHN  Registered Nurse-Clinic Triage  [facility]  2/9/2023 at 11:11 AM

## 2023-02-09 NOTE — PROGRESS NOTES
Assessment & Plan       ICD-10-CM    1. Post-COVID chronic cough  R05.3 albuterol (PROAIR HFA/PROVENTIL HFA/VENTOLIN HFA) 108 (90 Base) MCG/ACT inhaler    U09.9       2. Onychomycosis  B35.1 terbinafine (LAMISIL) 250 MG tablet     Comprehensive metabolic panel (BMP + Alb, Alk Phos, ALT, AST, Total. Bili, TP)      3. Essential hypertension, benign  I10 COMPREHENSIVE METABOLIC PANEL     COMPREHENSIVE METABOLIC PANEL      4. Mixed hyperlipidemia  E78.2 Lipid panel reflex to direct LDL Non-fasting     Lipid panel reflex to direct LDL Non-fasting      5. Glucose intolerance (impaired glucose tolerance)  R73.02 HEMOGLOBIN A1C     HEMOGLOBIN A1C      6. Need for vaccination  Z23 ZOSTER VACCINE RECOMBINANT ADJUVANTED (SHINGRIX)     INFLUENZA VACCINE 50-64 OR 18-64 W/EGG ALLERGY (FLUBLOK)           1.  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  Discussed various treatment options that we can consider for this.  Discussed that there is no hard and fast treatment that is always effective.  Discussed that we can pursue chest x-ray, but my suspicion that we will find something on x-ray is low.  He was open to a trial of albuterol and Flonase.  If not responding adequately to this, we can add inhaled steroid or consider systemic steroids.  We could also consider empiric antibiotics, but I do not have a strong suspicion that these will be effective.  2.  Previously noted, but starting to spread.  Patient is now interested in treatment.  We have discussed this at length previously.  We will go and treat with terbinafine and check monitoring labs in 1 month.  3.  Currently controlled.  We will continue current regimen and check monitoring labs.  4, 5.  We will check labs to monitor this.  Patient continues to work on lifestyle modifications to help keep this under control.  6.  Immunized.    Portions of this note were completed using Dragon dictation software.  Although reviewed, there may be typographical and  other inadvertent errors that remain.         Ordering of each unique test  Prescription drug management         Patient Instructions   Thank you for visiting [facility]    Let's try some albuterol to help with wheezing and cough.    Try the Flonase to help with your nasal congestion.  Can use twice daily if needed while you're ill.     If  not improving by next week, we can consider inhaled or oral steroids and/or empiric antibiotics.  CXR would also be an option.      I would recommend getting a COVID booster about 3 months after your illness.    OK to start Lamisil when you're comfortable.  Check  labs one month after starting.  Let me know if problems.      Contact us or return if questions or concerns.     Have a nice day!    Dr. Jolley     Return in about 6 months (around 8/9/2023) for Recheck.      If you need medication refills, please contact your pharmacy 3 days before your prescriptions runs out or download the [facility] gulshan for your smart phone. If you are out of refills, your pharmacy will contact contact the clinic.                                     [employer] Assistance [phone]                    Return in about 6 months (around 8/9/2023) for Recheck.    Marco Jolley MD, MD  [facility] ADITI Cavlillo is a 58 year old, presenting for the following health issues:  URI      URI    History of Present Illness       Reason for visit:  Ongoing cold symptoms  Symptom onset:  1-2 weeks ago    He eats 2-3 servings of fruits and vegetables daily.He consumes 0 sweetened beverage(s) daily.He exercises with enough effort to increase his heart rate 9 or less minutes per day.  He exercises with enough effort to increase his heart rate 3 or less days per week.   He is taking medications regularly.       Acute Illness  Acute illness concerns: congestion, ears plugged, coughing  At first he thought it was from being exposed to mold  Onset/Duration:  "1-2 weeks ago  Symptoms:  Fever: No  Chills/Sweats: YES  Headache (location?): YES, mild comes and goes  Sinus Pressure: YES  Conjunctivitis:  No  Ear Pain: plugged  Rhinorrhea: YES  Congestion: YES  Sore Throat: YES  Cough: YES  Wheeze: YES  Decreased Appetite: No  Nausea: No  Vomiting: No  Diarrhea: No  Dysuria/Freq.: YES  Dysuria or Hematuria: No  Fatigue/Achiness: YES  Sick/Strep Exposure: No  Therapies tried and outcome: cold medication, allergy medication    Pt had Covid around [facility].  He didn't take any Rx treatments.  He never felt like he fully got over it.      Still having lots of wheezing, cough.  Stuffy nose and fever  Resolved.      About 10-12 days ago, started having runny nose, more cough, nasal congestion.  Slight sore throat as well.      Review of Systems   Constitutional, HEENT, cardiovascular, pulmonary, gi and gu systems are negative, except as otherwise noted.          Objective    /70 (BP Location: Right arm, Patient Position: Sitting, Cuff Size: Adult Regular)   Pulse 67   Temp 97.2  F (36.2  C) (Temporal)   Resp 16   Ht 1.778 m (5' 10\")   Wt 96.2 kg (212 lb)   SpO2 98%   BMI 30.42 kg/m    Body mass index is 30.42 kg/m .  Physical Exam   GENERAL: healthy, alert and no distress  NECK: no adenopathy, no asymmetry, masses, or scars and thyroid normal to palpation  RESP: expiratory wheezes R lower posterior and prolonged expiratory phase  CV: regular rate and rhythm, normal S1 S2, no S3 or S4, no murmur, click or rub, no peripheral edema and peripheral pulses strong  ABDOMEN: soft, nontender, no hepatosplenomegaly, no masses and bowel sounds normal  MS: no gross musculoskeletal defects noted, no edema    Office Visit on 01/07/2023   Component Date Value Ref Range Status     Uric Acid 01/07/2023 7.3 (H)  3.5 - 7.2 mg/dL Final     Results for orders placed or performed in visit on 02/09/23   HEMOGLOBIN A1C     Status: Abnormal   Result Value Ref Range    Hemoglobin A1C 6.5 " (H) 0.0 - 5.6 %   Lipid panel reflex to direct LDL Non-fasting     Status: Abnormal   Result Value Ref Range    Cholesterol 102 <200 mg/dL    Triglycerides 340 (H) <150 mg/dL    Direct Measure HDL 23 (L) >=40 mg/dL    LDL Cholesterol Calculated 11 <=100 mg/dL    Non HDL Cholesterol 79 <130 mg/dL    Narrative    Cholesterol  Desirable:  <200 mg/dL    Triglycerides  Normal:  Less than 150 mg/dL  Borderline High:  150-199 mg/dL  High:  200-499 mg/dL  Very High:  Greater than or equal to 500 mg/dL    Direct Measure HDL  Female:  Greater than or equal to 50 mg/dL   Male:  Greater than or equal to 40 mg/dL    LDL Cholesterol  Desirable:  <100mg/dL  Above Desirable:  100-129 mg/dL   Borderline High:  130-159 mg/dL   High:  160-189 mg/dL   Very High:  >= 190 mg/dL    Non HDL Cholesterol  Desirable:  130 mg/dL  Above Desirable:  130-159 mg/dL  Borderline High:  160-189 mg/dL  High:  190-219 mg/dL  Very High:  Greater than or equal to 220 mg/dL   COMPREHENSIVE METABOLIC PANEL     Status: Abnormal   Result Value Ref Range    Sodium 139 136 - 145 mmol/L    Potassium 3.3 (L) 3.4 - 5.3 mmol/L    Chloride 101 98 - 107 mmol/L    Carbon Dioxide (CO2) 28 22 - 29 mmol/L    Anion Gap 10 7 - 15 mmol/L    Urea Nitrogen 16.0 8.0 - 23.0 mg/dL    Creatinine 0.83 0.67 - 1.17 mg/dL    Calcium 9.4 8.6 - 10.0 mg/dL    Glucose 126 (H) 70 - 99 mg/dL    Alkaline Phosphatase 66 40 - 129 U/L    AST 34 10 - 50 U/L    ALT 27 10 - 50 U/L    Protein Total 7.2 6.4 - 8.3 g/dL    Albumin 4.3 3.5 - 5.2 g/dL    Bilirubin Total 1.4 (H) <=1.2 mg/dL    GFR Estimate >90 >60 mL/min/1.73m2     No results found for this or any previous visit (from the past 24 hour(s)).

## 2023-02-09 NOTE — TELEPHONE ENCOUNTER
Contacted pt. Appointment scheduled.     Appointments in Next Year    Feb 09, 2023  4:30 PM  (Arrive by 4:10 PM)  Provider Visit with Marco Jolley MD  [facility] ([facility] ) [phone]            Sindi Quinn, FEIN, RN, PHN  Registered Nurse-Clinic Triage  [facility]  2/9/2023 at 1:24 PM

## 2023-02-09 NOTE — PATIENT INSTRUCTIONS
Thank you for visiting [facility]    Let's try some albuterol to help with wheezing and cough.    Try the Flonase to help with your nasal congestion.  Can use twice daily if needed while you're ill.     If  not improving by next week, we can consider inhaled or oral steroids and/or empiric antibiotics.  CXR would also be an option.      I would recommend getting a COVID booster about 3 months after your illness.    OK to start Lamisil when you're comfortable.  Check  labs one month after starting.  Let me know if problems.      Contact us or return if questions or concerns.     Have a nice day!    Dr. Jolley     Return in about 6 months (around 8/9/2023) for Recheck.      If you need medication refills, please contact your pharmacy 3 days before your prescriptions runs out or download the [facility] gulshan for your smart phone. If you are out of refills, your pharmacy will contact contact the clinic.                                     MyChart Assistance [phone]

## 2023-02-10 NOTE — RESULT ENCOUNTER NOTE
"Rajinder,    Your cholesterol numbers are relatively stable.  Unfortunately, your HDL \"good\" cholesterol is on the low side.  Increasing your aerobic activity such as walking, biking, swimming, etc. can help with this.    Your blood sugars continue to slowly increase.  Today's A1c was consistent with well-controlled diabetes.  If you are interested, we could increase your metformin dose.  Continue to work on healthy eating and activity.  Recheck within 6 months.    Your potassium was slightly low.  You can try to increase potassium rich foods in your diet, or we could add a low-dose potassium supplement.  We could also reduce your chlorthalidone dose and/or consider a different blood pressure medication that would be more protective of your kidneys against diabetes.    Your bilirubin remains slightly elevated, but it is improved from previously.    Other labs are normal.    Have a nice day!    Dr. Jolley      "

## 2023-02-22 ENCOUNTER — MYC MEDICAL ADVICE (OUTPATIENT)
Dept: FAMILY MEDICINE | Facility: OTHER | Age: 58
End: 2023-02-22
Payer: COMMERCIAL

## 2023-02-22 DIAGNOSIS — I10 ESSENTIAL HYPERTENSION, BENIGN: Primary | ICD-10-CM

## 2023-02-23 RX ORDER — LISINOPRIL 10 MG/1
10 TABLET ORAL DAILY
Qty: 30 TABLET | Refills: 1 | Status: SHIPPED | OUTPATIENT
Start: 2023-02-23 | End: 2023-05-05

## 2023-02-27 DIAGNOSIS — E78.2 MIXED HYPERLIPIDEMIA: ICD-10-CM

## 2023-03-01 RX ORDER — ROSUVASTATIN CALCIUM 10 MG/1
TABLET, COATED ORAL
Qty: 90 TABLET | Refills: 1 | Status: SHIPPED | OUTPATIENT
Start: 2023-03-01 | End: 2023-06-13

## 2023-03-06 ENCOUNTER — TRANSFERRED RECORDS (OUTPATIENT)
Dept: HEALTH INFORMATION MANAGEMENT | Facility: CLINIC | Age: 58
End: 2023-03-06

## 2023-03-31 ENCOUNTER — TRANSFERRED RECORDS (OUTPATIENT)
Dept: HEALTH INFORMATION MANAGEMENT | Facility: CLINIC | Age: 58
End: 2023-03-31
Payer: COMMERCIAL

## 2023-03-31 LAB — RETINOPATHY: NEGATIVE

## 2023-04-10 DIAGNOSIS — I10 ESSENTIAL HYPERTENSION, BENIGN: ICD-10-CM

## 2023-04-10 DIAGNOSIS — R73.02 GLUCOSE INTOLERANCE (IMPAIRED GLUCOSE TOLERANCE): ICD-10-CM

## 2023-04-10 RX ORDER — METFORMIN HCL 500 MG
TABLET, EXTENDED RELEASE 24 HR ORAL
Qty: 90 TABLET | Refills: 0 | Status: SHIPPED | OUTPATIENT
Start: 2023-04-10 | End: 2023-07-05

## 2023-04-10 RX ORDER — ATENOLOL 50 MG/1
TABLET ORAL
Qty: 90 TABLET | Refills: 0 | Status: SHIPPED | OUTPATIENT
Start: 2023-04-10 | End: 2023-07-05

## 2023-04-10 NOTE — TELEPHONE ENCOUNTER
Prescription approved per [facility] Refill Protocol.  Sindi Quinn RN on 4/10/2023 at 3:01 PM

## 2023-05-05 DIAGNOSIS — E87.6 HYPOKALEMIA: Primary | ICD-10-CM

## 2023-05-05 DIAGNOSIS — I10 ESSENTIAL HYPERTENSION, BENIGN: ICD-10-CM

## 2023-05-05 RX ORDER — LISINOPRIL 10 MG/1
TABLET ORAL
Qty: 30 TABLET | Refills: 0 | Status: SHIPPED | OUTPATIENT
Start: 2023-05-05 | End: 2023-06-27

## 2023-05-05 NOTE — TELEPHONE ENCOUNTER
"Pending Prescriptions:                       Disp   Refills    lisinopril (ZESTRIL) 10 MG tablet [Pharmac*30 tab*0        Sig: Take 1 tablet by mouth once daily    Routing refill request to provider for review/approval because:  Requested Prescriptions   Pending Prescriptions Disp Refills    lisinopril (ZESTRIL) 10 MG tablet [Pharmacy Med Name: Lisinopril 10 MG Oral Tablet] 30 tablet 0     Sig: Take 1 tablet by mouth once daily       ACE Inhibitors (Including Combos) Protocol Failed - 5/5/2023  2:17 PM        Failed - Normal serum potassium on file in past 12 months     Recent Labs   Lab Test 02/09/23  1741   POTASSIUM 3.3*               Passed - Blood pressure under 140/90 in past 12 months     BP Readings from Last 3 Encounters:   02/09/23 110/70   01/07/23 120/79   11/14/22 122/70                 Passed - Recent (12 mo) or future (30 days) visit within the authorizing provider's specialty     Patient has had an office visit with the authorizing provider or a provider within the authorizing providers department within the previous 12 mos or has a future within next 30 days. See \"Patient Info\" tab in inbasket, or \"Choose Columns\" in Meds & Orders section of the refill encounter.              Passed - Medication is active on med list        Passed - Patient is age 18 or older        Passed - Normal serum creatinine on file in past 12 months     Recent Labs   Lab Test 02/09/23  1741   CR 0.83       Ok to refill medication if creatinine is low             lisinopril (ZESTRIL) 10 MG tablet 30 tablet 1 2/23/2023  --   Sig - Route: Take 1 tablet (10 mg) by mouth daily - Oral   Sent to pharmacy as: Lisinopril 10 MG Oral Tablet (ZESTRIL)   Class: E-Prescribe   Order: [id number]   E-Prescribing Status: Receipt confirmed by pharmacy (2/23/2023  4:25 PM CST)     Sindi Quinn RN on 5/5/2023 at 2:18 PM        "

## 2023-05-05 NOTE — TELEPHONE ENCOUNTER
Your prescription has been refilled.  Please get a lab visit in the next month to ensure that your potassium is back to normal.

## 2023-05-11 ENCOUNTER — PATIENT OUTREACH (OUTPATIENT)
Dept: CARE COORDINATION | Facility: CLINIC | Age: 58
End: 2023-05-11
Payer: COMMERCIAL

## 2023-05-16 ENCOUNTER — LAB (OUTPATIENT)
Dept: LAB | Facility: OTHER | Age: 58
End: 2023-05-16
Payer: COMMERCIAL

## 2023-05-16 DIAGNOSIS — R73.02 GLUCOSE INTOLERANCE (IMPAIRED GLUCOSE TOLERANCE): ICD-10-CM

## 2023-05-16 DIAGNOSIS — B35.1 ONYCHOMYCOSIS: ICD-10-CM

## 2023-05-16 LAB
ALBUMIN SERPL BCG-MCNC: 4.4 G/DL (ref 3.5–5.2)
ALP SERPL-CCNC: 67 U/L (ref 40–129)
ALT SERPL W P-5'-P-CCNC: 29 U/L (ref 10–50)
ANION GAP SERPL CALCULATED.3IONS-SCNC: 10 MMOL/L (ref 7–15)
AST SERPL W P-5'-P-CCNC: 27 U/L (ref 10–50)
BILIRUB SERPL-MCNC: 1.8 MG/DL
BUN SERPL-MCNC: 14 MG/DL (ref 8–23)
CALCIUM SERPL-MCNC: 9.2 MG/DL (ref 8.6–10)
CHLORIDE SERPL-SCNC: 107 MMOL/L (ref 98–107)
CREAT SERPL-MCNC: 0.87 MG/DL (ref 0.67–1.17)
DEPRECATED HCO3 PLAS-SCNC: 23 MMOL/L (ref 22–29)
GFR SERPL CREATININE-BSD FRML MDRD: >90 ML/MIN/1.73M2
GLUCOSE SERPL-MCNC: 120 MG/DL (ref 70–99)
HBA1C MFR BLD: 5.7 % (ref 0–5.6)
POTASSIUM SERPL-SCNC: 4.3 MMOL/L (ref 3.4–5.3)
PROT SERPL-MCNC: 6.8 G/DL (ref 6.4–8.3)
SODIUM SERPL-SCNC: 140 MMOL/L (ref 136–145)

## 2023-05-16 PROCEDURE — 83036 HEMOGLOBIN GLYCOSYLATED A1C: CPT

## 2023-05-16 PROCEDURE — 80053 COMPREHEN METABOLIC PANEL: CPT

## 2023-05-16 PROCEDURE — 36415 COLL VENOUS BLD VENIPUNCTURE: CPT

## 2023-05-17 NOTE — RESULT ENCOUNTER NOTE
Rajinder,    Good improvement in your diabetes control.  Other labs are stable.    Have a nice day!    Dr. Jolley

## 2023-05-21 ENCOUNTER — TELEPHONE (OUTPATIENT)
Dept: SLEEP MEDICINE | Facility: CLINIC | Age: 58
End: 2023-05-21
Payer: COMMERCIAL

## 2023-05-22 NOTE — TELEPHONE ENCOUNTER
Patient scheduled to  replacement CPAP from [facility] on 5/23/23 at 10:00 am.

## 2023-05-23 ENCOUNTER — DOCUMENTATION ONLY (OUTPATIENT)
Dept: SLEEP MEDICINE | Facility: CLINIC | Age: 58
End: 2023-05-23
Payer: COMMERCIAL

## 2023-05-23 DIAGNOSIS — G47.33 OSA (OBSTRUCTIVE SLEEP APNEA): Primary | ICD-10-CM

## 2023-05-23 NOTE — PROGRESS NOTES
Patient was offered choice of vendor and chose [facility].  Patient Rajinder Siegel was set up at [place] on May 23, 2023. Patient received a Resmed Airsense 10 Pressures were set at 5-10 cm H2O.   Patient s ramp is 5 cm H2O for Off and FLEX/EPR is EPR.  Patient received a Klever Respironics Mask name: dreamwear  Nasal mask size Medium, heated tubing and heated humidifier.  Patient has the following compliance requirements: none  Patient has a follow up on TBD with CEE Perry, CNP.    Shira Mac

## 2023-05-25 ENCOUNTER — PATIENT OUTREACH (OUTPATIENT)
Dept: CARE COORDINATION | Facility: CLINIC | Age: 58
End: 2023-05-25
Payer: COMMERCIAL

## 2023-06-27 DIAGNOSIS — I10 ESSENTIAL HYPERTENSION, BENIGN: ICD-10-CM

## 2023-06-27 RX ORDER — LISINOPRIL 10 MG/1
TABLET ORAL
Qty: 30 TABLET | Refills: 7 | Status: SHIPPED | OUTPATIENT
Start: 2023-06-27 | End: 2024-01-15

## 2023-07-04 DIAGNOSIS — R73.02 GLUCOSE INTOLERANCE (IMPAIRED GLUCOSE TOLERANCE): ICD-10-CM

## 2023-07-04 DIAGNOSIS — I10 ESSENTIAL HYPERTENSION, BENIGN: ICD-10-CM

## 2023-07-05 RX ORDER — METFORMIN HCL 500 MG
TABLET, EXTENDED RELEASE 24 HR ORAL
Qty: 90 TABLET | Refills: 0 | Status: SHIPPED | OUTPATIENT
Start: 2023-07-05 | End: 2023-10-19

## 2023-07-05 RX ORDER — ATENOLOL 50 MG/1
TABLET ORAL
Qty: 90 TABLET | Refills: 0 | Status: SHIPPED | OUTPATIENT
Start: 2023-07-05 | End: 2023-10-20

## 2023-07-13 ENCOUNTER — LAB (OUTPATIENT)
Dept: LAB | Facility: OTHER | Age: 58
End: 2023-07-13
Payer: COMMERCIAL

## 2023-07-13 DIAGNOSIS — I10 ESSENTIAL HYPERTENSION, BENIGN: ICD-10-CM

## 2023-07-13 DIAGNOSIS — E87.6 HYPOKALEMIA: ICD-10-CM

## 2023-07-13 LAB
ANION GAP SERPL CALCULATED.3IONS-SCNC: 9 MMOL/L (ref 7–15)
BUN SERPL-MCNC: 13.4 MG/DL (ref 8–23)
CALCIUM SERPL-MCNC: 9 MG/DL (ref 8.6–10)
CHLORIDE SERPL-SCNC: 107 MMOL/L (ref 98–107)
CREAT SERPL-MCNC: 0.87 MG/DL (ref 0.67–1.17)
DEPRECATED HCO3 PLAS-SCNC: 23 MMOL/L (ref 22–29)
GFR SERPL CREATININE-BSD FRML MDRD: >90 ML/MIN/1.73M2
GLUCOSE SERPL-MCNC: 126 MG/DL (ref 70–99)
POTASSIUM SERPL-SCNC: 4.2 MMOL/L (ref 3.4–5.3)
SODIUM SERPL-SCNC: 139 MMOL/L (ref 136–145)

## 2023-07-13 PROCEDURE — 36415 COLL VENOUS BLD VENIPUNCTURE: CPT

## 2023-07-13 PROCEDURE — 80048 BASIC METABOLIC PNL TOTAL CA: CPT

## 2023-09-10 ENCOUNTER — HEALTH MAINTENANCE LETTER (OUTPATIENT)
Age: 58
End: 2023-09-10

## 2023-09-20 ENCOUNTER — E-VISIT (OUTPATIENT)
Dept: FAMILY MEDICINE | Facility: OTHER | Age: 58
End: 2023-09-20
Payer: COMMERCIAL

## 2023-09-20 DIAGNOSIS — M54.42 CHRONIC BILATERAL LOW BACK PAIN WITH LEFT-SIDED SCIATICA: Primary | ICD-10-CM

## 2023-09-20 DIAGNOSIS — G89.29 CHRONIC BILATERAL LOW BACK PAIN WITH LEFT-SIDED SCIATICA: Primary | ICD-10-CM

## 2023-09-20 PROCEDURE — 99422 OL DIG E/M SVC 11-20 MIN: CPT | Performed by: FAMILY MEDICINE

## 2023-09-21 RX ORDER — METHOCARBAMOL 750 MG/1
750 TABLET, FILM COATED ORAL 3 TIMES DAILY PRN
Qty: 30 TABLET | Refills: 0 | Status: SHIPPED | OUTPATIENT
Start: 2023-09-21

## 2023-09-21 NOTE — PATIENT INSTRUCTIONS
Thank you for choosing us for your care. I have placed an order for a prescription so that you can start treatment. View your full visit summary for details by clicking on the link below. Your pharmacist will able to address any questions you may have about the medication.      If you're not feeling better within 2-3 weeks please schedule an appointment.  You can schedule an appointment right here in [facility], or call [phone]  If the visit is for the same symptoms as your eVisit, we'll refund the cost of your eVisit if seen within seven days.    Caring for Your Back    You are not alone.    Low back pain is very common. Nearly half of all adults will have low back pain in any given year. The good news is that back pain is rarely a danger to your health. Most people can manage their back pain on their own. About half of people start feeling better within two weeks. In 9 out of 10 cases, low back pain goes away or no longer limits daily activity within 6 weeks.     Your outlook is good!     Your symptoms tell us that your low back pain is most likely not a danger to you. Most of the time we will not know the exact cause of low back pain, even if you see a doctor or have an MRI. However, treatment can still work without knowing the cause of the pain. Less than 1 in 100 people need surgery for their back pain.     What can I do about my low back pain?     There are three basic things you can do to ease low back pain and help it go away.     Use heat or cold packs.    Take medicine as directed.    Use positions, movements and exercises.    Using heat or cold packs:    Try cold packs or gentle heat to ease your pain.  Use whichever gives you the most relief. Apply the cold pack or heat for 15 minutes at a time, as often as needed.    Taking medicine:    If taking over-the-counter medicine:    Take ibuprofen (Advil, Motrin) 600 mg three times a day as needed for pain.  OR    Take Aleve (naproxen) 220 to 440 mg two  times a day as needed for pain. If your doctor prescribed a muscle relaxant (cyclobenzaprine 10 mg.):    Take   to 1 tablet at bedtime.    Do not drive when taking this medicine. This drug may make you sleepy.     Using positions, movements and exercises:    Research tells us that moving your joints and muscles can help you recover from back pain. Such activity should be simple and gentle. Use the positions below as well as walking to help relieve your pain. Try taking a short walk every 3 to 4 hours during the day. Walk for a few minutes inside your home or take longer walks outside, on a treadmill or at a mall. Slowly increase the amount of time you walk. Expect discomfort when you begin, but it should lessen as your back starts to heal. When your back feels better, walk daily to keep your back and body healthy.    Finding a position that is comfortable:    When your back pain is new, certain positions will ease your pain. Gently try each of the positions below until you find one that is helpful. Once you find a position of comfort, use it as often as you like when you are resting. You will recover faster if you combine rest with activity.    * Lie on your back with your legs bent. You can do this by placing a pillow under your knees or lie on the floor and rest your lower legs on the seat of a chair.  * Lie on your side with your knees bent and place a pillow between your knees.    Lie on your stomach over pillows.       When should I call my doctor?    Your back pain should improve over the first couple of weeks. As it improves, you should be able to return to your normal activities.  But call your doctor if:      You have a sudden change in your ability to control your bladder or bowels.    You begin to feel tingling in your groin or legs.    The pain spreads down your leg and into your foot.    Your toes, feet or leg muscles begin to feel weak.    You feel generally unwell or sick.    Your pain gets  worse.    If you are deaf or hard of hearing, please let us know. We provide many free services including sign language interpreters, oral interpreters, TTYs, telephone amplifiers, note takers and written materials.    For informational purposes only. Not to replace the advice of your health care provider. Copyright   2013 [facility]. All rights reserved. [employer] 050099 - 04/13.    Mini Relaxation Exercises  Source [email or web address] [facility]org    Mini relaxation exercises are focused breathing techniques that help reduce  anxiety and tension immediately. You can do them with your eyes open or  closed. You can do them any place, at any time; no one will know that you are  doing them.    Good Times to Do a  Mini     Before taking an exam    While at the computer lab waiting for your document to print    While waiting in line    When someone says something that bothers you    While waiting for the announcement of a grade    While waiting for a phone call    In the dentist s chair    When you feel overwhelmed by what you need to accomplish in the near  future    When in pain    When you want to     Mini Version 1- Breath Focused  Position yourself in a supportive comfortable chair or lying in a position that is least painful. (Often this is on your back with pillows under your knees)    Count very slowly to yourself from ten down to zero, one number for  each breath. Thus, with the first diaphragmatic breath, you say  ten  to  yourself, with the next breath, you say  nine , etc. If you start feeling  light-headed or dizzy, slow down the counting. When you get to  zero ,  see how you are feeling. If you are feeling better, great! If not, try to  do it again.    b. As you inhale, count very slowly up to four; as you exhale, count slowly  back down to one. Thus, as you inhale, you say to yourself  one, two,  three, four , as you exhale, you say to yourself  four, three, two, one .  Do this several times.    c.  After each inhalation, pause for a few seconds; after you exhale, pause  again for a few seconds. Do this for several breaths.    Mini Version 2- Physical Focused  a. Massage your forehead, jaw, back of neck, and shoulders  b. Stretch and yawn  c. Walk counting four paces as you breathe in and four paces as you  breathe out  d. Coordinate your breath with any activity, for example, writing, jogging,  drawing, lifting weights, walking, etc.    Mini Version 3- Imagery Focused  Position yourself in a supportive comfortable chair or lying in a position that is least painful. (Often this is on your back with pillows under your knees)    a. Briefly picture yourself in a place you find relaxing  b. Contemplate a picture of a natural scene  c. Imagine the characteristics of one of the following or any other object  which portrays characteristics you find calming or supportive in the  moment:    Tree (strong, rooted, expansive)    Mountain (timeless, strong, stable)    Waves (fluid, limitless)    Sun (radiant, warm)    Wind (free)    Mini Version 4- Mindfulness Focused  a. Look out the window for a few moments  b. Notice something new  c. Listen. What is the most distant sound you hear? The next distant?  d. Appreciate the sensation of being in the situation, the feel, smell, sight of  certain objects  e. Focus on being exactly where you are, keeping your thoughts from flowing  into the future or past

## 2023-10-03 ENCOUNTER — TELEPHONE (OUTPATIENT)
Dept: FAMILY MEDICINE | Facility: OTHER | Age: 58
End: 2023-10-03
Payer: COMMERCIAL

## 2023-10-03 NOTE — TELEPHONE ENCOUNTER
INCOMING FORMS    Sender: [facility]    Type of Form, letter or note (What is requested?): Order    How was the form received?: Fax    How should forms be returned?:  Fax : [phone]    Form placed in DJ bin for review/signature if appropriate.

## 2023-10-19 DIAGNOSIS — I10 ESSENTIAL HYPERTENSION, BENIGN: ICD-10-CM

## 2023-10-19 DIAGNOSIS — R73.02 GLUCOSE INTOLERANCE (IMPAIRED GLUCOSE TOLERANCE): ICD-10-CM

## 2023-10-19 RX ORDER — METFORMIN HCL 500 MG
TABLET, EXTENDED RELEASE 24 HR ORAL
Qty: 90 TABLET | Refills: 0 | Status: SHIPPED | OUTPATIENT
Start: 2023-10-19 | End: 2024-01-22

## 2023-10-20 RX ORDER — ATENOLOL 50 MG/1
TABLET ORAL
Qty: 90 TABLET | Refills: 0 | Status: SHIPPED | OUTPATIENT
Start: 2023-10-20 | End: 2024-01-22

## 2023-11-08 ENCOUNTER — LAB (OUTPATIENT)
Dept: LAB | Facility: OTHER | Age: 58
End: 2023-11-08
Payer: COMMERCIAL

## 2023-11-08 DIAGNOSIS — R73.02 GLUCOSE INTOLERANCE (IMPAIRED GLUCOSE TOLERANCE): ICD-10-CM

## 2023-11-08 LAB
ALBUMIN SERPL BCG-MCNC: 4.5 G/DL (ref 3.5–5.2)
ALP SERPL-CCNC: 81 U/L (ref 40–129)
ALT SERPL W P-5'-P-CCNC: 28 U/L (ref 0–70)
ANION GAP SERPL CALCULATED.3IONS-SCNC: 11 MMOL/L (ref 7–15)
AST SERPL W P-5'-P-CCNC: 30 U/L (ref 0–45)
BILIRUB SERPL-MCNC: 1.4 MG/DL
BUN SERPL-MCNC: 19 MG/DL (ref 8–23)
CALCIUM SERPL-MCNC: 9.2 MG/DL (ref 8.6–10)
CHLORIDE SERPL-SCNC: 105 MMOL/L (ref 98–107)
CREAT SERPL-MCNC: 1.03 MG/DL (ref 0.67–1.17)
CREAT UR-MCNC: 149.7 MG/DL
DEPRECATED HCO3 PLAS-SCNC: 25 MMOL/L (ref 22–29)
EGFRCR SERPLBLD CKD-EPI 2021: 84 ML/MIN/1.73M2
GLUCOSE SERPL-MCNC: 109 MG/DL (ref 70–99)
HBA1C MFR BLD: 6 % (ref 0–5.6)
MICROALBUMIN UR-MCNC: <12 MG/L
MICROALBUMIN/CREAT UR: NORMAL MG/G{CREAT}
POTASSIUM SERPL-SCNC: 4.2 MMOL/L (ref 3.4–5.3)
PROT SERPL-MCNC: 6.9 G/DL (ref 6.4–8.3)
SODIUM SERPL-SCNC: 141 MMOL/L (ref 135–145)

## 2023-11-08 PROCEDURE — 82043 UR ALBUMIN QUANTITATIVE: CPT

## 2023-11-08 PROCEDURE — 80053 COMPREHEN METABOLIC PANEL: CPT

## 2023-11-08 PROCEDURE — 36415 COLL VENOUS BLD VENIPUNCTURE: CPT

## 2023-11-08 PROCEDURE — 82570 ASSAY OF URINE CREATININE: CPT

## 2023-11-08 PROCEDURE — 83036 HEMOGLOBIN GLYCOSYLATED A1C: CPT

## 2023-11-24 ENCOUNTER — DOCUMENTATION ONLY (OUTPATIENT)
Dept: SLEEP MEDICINE | Facility: CLINIC | Age: 58
End: 2023-11-24
Payer: COMMERCIAL

## 2023-11-24 DIAGNOSIS — G47.33 OSA (OBSTRUCTIVE SLEEP APNEA): Primary | ICD-10-CM

## 2023-12-05 DIAGNOSIS — E78.2 MIXED HYPERLIPIDEMIA: ICD-10-CM

## 2023-12-05 RX ORDER — ROSUVASTATIN CALCIUM 10 MG/1
TABLET, COATED ORAL
Qty: 90 TABLET | Refills: 0 | Status: SHIPPED | OUTPATIENT
Start: 2023-12-05 | End: 2024-03-06

## 2024-01-15 ENCOUNTER — MYC REFILL (OUTPATIENT)
Dept: FAMILY MEDICINE | Facility: OTHER | Age: 59
End: 2024-01-15
Payer: COMMERCIAL

## 2024-01-15 DIAGNOSIS — I10 ESSENTIAL HYPERTENSION, BENIGN: ICD-10-CM

## 2024-01-16 RX ORDER — LISINOPRIL 10 MG/1
10 TABLET ORAL DAILY
Qty: 90 TABLET | Refills: 1 | Status: SHIPPED | OUTPATIENT
Start: 2024-01-16 | End: 2024-04-22

## 2024-01-21 DIAGNOSIS — R73.02 GLUCOSE INTOLERANCE (IMPAIRED GLUCOSE TOLERANCE): ICD-10-CM

## 2024-01-21 DIAGNOSIS — I10 ESSENTIAL HYPERTENSION, BENIGN: ICD-10-CM

## 2024-01-22 RX ORDER — METFORMIN HCL 500 MG
TABLET, EXTENDED RELEASE 24 HR ORAL
Qty: 90 TABLET | Refills: 0 | Status: SHIPPED | OUTPATIENT
Start: 2024-01-22 | End: 2024-04-15

## 2024-01-22 RX ORDER — ATENOLOL 50 MG/1
TABLET ORAL
Qty: 90 TABLET | Refills: 0 | Status: SHIPPED | OUTPATIENT
Start: 2024-01-22 | End: 2024-04-15

## 2024-01-22 NOTE — TELEPHONE ENCOUNTER
Your medication has been refilled.  Please schedule a visit to follow up on how this medication is working for you before your next refill.  We need to be sure everything is working well and stable prior to further refills.  I will be unable to provide any more refills without follow-up.

## 2024-03-04 DIAGNOSIS — E78.2 MIXED HYPERLIPIDEMIA: ICD-10-CM

## 2024-03-06 RX ORDER — ROSUVASTATIN CALCIUM 10 MG/1
TABLET, COATED ORAL
Qty: 45 TABLET | Refills: 0 | Status: SHIPPED | OUTPATIENT
Start: 2024-03-06 | End: 2024-04-04

## 2024-04-02 DIAGNOSIS — E78.2 MIXED HYPERLIPIDEMIA: ICD-10-CM

## 2024-04-04 RX ORDER — ROSUVASTATIN CALCIUM 10 MG/1
TABLET, COATED ORAL
Qty: 20 TABLET | Refills: 0 | Status: SHIPPED | OUTPATIENT
Start: 2024-04-04 | End: 2024-04-22

## 2024-04-07 ENCOUNTER — HEALTH MAINTENANCE LETTER (OUTPATIENT)
Age: 59
End: 2024-04-07

## 2024-04-15 DIAGNOSIS — I10 ESSENTIAL HYPERTENSION, BENIGN: ICD-10-CM

## 2024-04-15 DIAGNOSIS — R73.02 GLUCOSE INTOLERANCE (IMPAIRED GLUCOSE TOLERANCE): ICD-10-CM

## 2024-04-15 RX ORDER — METFORMIN HCL 500 MG
TABLET, EXTENDED RELEASE 24 HR ORAL
Qty: 30 TABLET | Refills: 0 | Status: SHIPPED | OUTPATIENT
Start: 2024-04-15 | End: 2024-04-22

## 2024-04-15 RX ORDER — ATENOLOL 50 MG/1
TABLET ORAL
Qty: 30 TABLET | Refills: 0 | Status: SHIPPED | OUTPATIENT
Start: 2024-04-15 | End: 2024-04-22

## 2024-04-21 SDOH — HEALTH STABILITY: PHYSICAL HEALTH: ON AVERAGE, HOW MANY MINUTES DO YOU ENGAGE IN EXERCISE AT THIS LEVEL?: 20 MIN

## 2024-04-21 SDOH — HEALTH STABILITY: PHYSICAL HEALTH: ON AVERAGE, HOW MANY DAYS PER WEEK DO YOU ENGAGE IN MODERATE TO STRENUOUS EXERCISE (LIKE A BRISK WALK)?: 3 DAYS

## 2024-04-21 ASSESSMENT — SOCIAL DETERMINANTS OF HEALTH (SDOH): HOW OFTEN DO YOU GET TOGETHER WITH FRIENDS OR RELATIVES?: PATIENT DECLINED

## 2024-04-22 ENCOUNTER — MYC MEDICAL ADVICE (OUTPATIENT)
Dept: FAMILY MEDICINE | Facility: OTHER | Age: 59
End: 2024-04-22

## 2024-04-22 ENCOUNTER — OFFICE VISIT (OUTPATIENT)
Dept: FAMILY MEDICINE | Facility: OTHER | Age: 59
End: 2024-04-22
Payer: COMMERCIAL

## 2024-04-22 VITALS
OXYGEN SATURATION: 97 % | WEIGHT: 220 LBS | TEMPERATURE: 97.8 F | HEART RATE: 61 BPM | HEIGHT: 70 IN | BODY MASS INDEX: 31.5 KG/M2 | RESPIRATION RATE: 18 BRPM | DIASTOLIC BLOOD PRESSURE: 68 MMHG | SYSTOLIC BLOOD PRESSURE: 110 MMHG

## 2024-04-22 DIAGNOSIS — R73.02 GLUCOSE INTOLERANCE (IMPAIRED GLUCOSE TOLERANCE): ICD-10-CM

## 2024-04-22 DIAGNOSIS — I10 ESSENTIAL HYPERTENSION, BENIGN: ICD-10-CM

## 2024-04-22 DIAGNOSIS — R42 LIGHTHEADEDNESS: ICD-10-CM

## 2024-04-22 DIAGNOSIS — Z23 NEED FOR VACCINATION: ICD-10-CM

## 2024-04-22 DIAGNOSIS — Z00.00 ROUTINE GENERAL MEDICAL EXAMINATION AT A HEALTH CARE FACILITY: Primary | ICD-10-CM

## 2024-04-22 DIAGNOSIS — E78.2 MIXED HYPERLIPIDEMIA: ICD-10-CM

## 2024-04-22 DIAGNOSIS — J06.9 UPPER RESPIRATORY TRACT INFECTION, UNSPECIFIED TYPE: ICD-10-CM

## 2024-04-22 DIAGNOSIS — Z00.00 ROUTINE HISTORY AND PHYSICAL EXAMINATION OF ADULT: Primary | ICD-10-CM

## 2024-04-22 LAB
ALBUMIN SERPL BCG-MCNC: 4.6 G/DL (ref 3.5–5.2)
ALP SERPL-CCNC: 77 U/L (ref 40–150)
ALT SERPL W P-5'-P-CCNC: 34 U/L (ref 0–70)
ANION GAP SERPL CALCULATED.3IONS-SCNC: 11 MMOL/L (ref 7–15)
AST SERPL W P-5'-P-CCNC: 41 U/L (ref 0–45)
BILIRUB SERPL-MCNC: 1.3 MG/DL
BUN SERPL-MCNC: 23.5 MG/DL (ref 8–23)
CALCIUM SERPL-MCNC: 9.5 MG/DL (ref 8.8–10.2)
CHLORIDE SERPL-SCNC: 107 MMOL/L (ref 98–107)
CHOLEST SERPL-MCNC: 186 MG/DL
CREAT SERPL-MCNC: 1.05 MG/DL (ref 0.67–1.17)
DEPRECATED HCO3 PLAS-SCNC: 24 MMOL/L (ref 22–29)
EGFRCR SERPLBLD CKD-EPI 2021: 81 ML/MIN/1.73M2
ERYTHROCYTE [DISTWIDTH] IN BLOOD BY AUTOMATED COUNT: 12.8 % (ref 10–15)
FASTING STATUS PATIENT QL REPORTED: YES
GLUCOSE SERPL-MCNC: 124 MG/DL (ref 70–99)
HBA1C MFR BLD: 5.9 % (ref 0–5.6)
HCT VFR BLD AUTO: 44.8 % (ref 40–53)
HDLC SERPL-MCNC: 36 MG/DL
HGB BLD-MCNC: 14.9 G/DL (ref 13.3–17.7)
LDLC SERPL CALC-MCNC: 119 MG/DL
MCH RBC QN AUTO: 29.4 PG (ref 26.5–33)
MCHC RBC AUTO-ENTMCNC: 33.3 G/DL (ref 31.5–36.5)
MCV RBC AUTO: 89 FL (ref 78–100)
NONHDLC SERPL-MCNC: 150 MG/DL
PLATELET # BLD AUTO: 194 10E3/UL (ref 150–450)
POTASSIUM SERPL-SCNC: 4 MMOL/L (ref 3.4–5.3)
PROT SERPL-MCNC: 6.9 G/DL (ref 6.4–8.3)
RBC # BLD AUTO: 5.06 10E6/UL (ref 4.4–5.9)
SODIUM SERPL-SCNC: 142 MMOL/L (ref 135–145)
TRIGL SERPL-MCNC: 156 MG/DL
TSH SERPL DL<=0.005 MIU/L-ACNC: 3.64 UIU/ML (ref 0.3–4.2)
WBC # BLD AUTO: 6.6 10E3/UL (ref 4–11)

## 2024-04-22 PROCEDURE — 85027 COMPLETE CBC AUTOMATED: CPT | Performed by: FAMILY MEDICINE

## 2024-04-22 PROCEDURE — 36415 COLL VENOUS BLD VENIPUNCTURE: CPT | Performed by: FAMILY MEDICINE

## 2024-04-22 PROCEDURE — 90678 RSV VACC PREF BIVALENT IM: CPT | Performed by: FAMILY MEDICINE

## 2024-04-22 PROCEDURE — 90472 IMMUNIZATION ADMIN EACH ADD: CPT | Performed by: FAMILY MEDICINE

## 2024-04-22 PROCEDURE — 83036 HEMOGLOBIN GLYCOSYLATED A1C: CPT | Performed by: FAMILY MEDICINE

## 2024-04-22 PROCEDURE — 80053 COMPREHEN METABOLIC PANEL: CPT | Performed by: FAMILY MEDICINE

## 2024-04-22 PROCEDURE — 84443 ASSAY THYROID STIM HORMONE: CPT | Performed by: FAMILY MEDICINE

## 2024-04-22 PROCEDURE — 99213 OFFICE O/P EST LOW 20 MIN: CPT | Mod: 25 | Performed by: FAMILY MEDICINE

## 2024-04-22 PROCEDURE — 90471 IMMUNIZATION ADMIN: CPT | Performed by: FAMILY MEDICINE

## 2024-04-22 PROCEDURE — 90750 HZV VACC RECOMBINANT IM: CPT | Performed by: FAMILY MEDICINE

## 2024-04-22 PROCEDURE — 93000 ELECTROCARDIOGRAM COMPLETE: CPT | Performed by: FAMILY MEDICINE

## 2024-04-22 PROCEDURE — 80061 LIPID PANEL: CPT | Performed by: FAMILY MEDICINE

## 2024-04-22 PROCEDURE — 99396 PREV VISIT EST AGE 40-64: CPT | Mod: 25 | Performed by: FAMILY MEDICINE

## 2024-04-22 RX ORDER — BLOOD-GLUCOSE SENSOR
1 EACH MISCELLANEOUS
Qty: 6 EACH | Refills: 3 | Status: SHIPPED | OUTPATIENT
Start: 2024-04-22 | End: 2024-07-11

## 2024-04-22 RX ORDER — ATENOLOL 50 MG/1
50 TABLET ORAL DAILY
Qty: 90 TABLET | Refills: 3 | Status: SHIPPED | OUTPATIENT
Start: 2024-04-22 | End: 2024-07-11

## 2024-04-22 RX ORDER — LISINOPRIL 10 MG/1
10 TABLET ORAL DAILY
Qty: 90 TABLET | Refills: 3 | Status: SHIPPED | OUTPATIENT
Start: 2024-04-22 | End: 2024-05-20

## 2024-04-22 RX ORDER — ROSUVASTATIN CALCIUM 10 MG/1
10 TABLET, COATED ORAL DAILY
Qty: 90 TABLET | Refills: 3 | Status: SHIPPED | OUTPATIENT
Start: 2024-04-22 | End: 2024-05-20

## 2024-04-22 RX ORDER — METFORMIN HCL 500 MG
TABLET, EXTENDED RELEASE 24 HR ORAL
Qty: 90 TABLET | Refills: 3 | Status: SHIPPED | OUTPATIENT
Start: 2024-04-22 | End: 2024-05-20

## 2024-04-22 ASSESSMENT — PAIN SCALES - GENERAL: PAINLEVEL: MILD PAIN (3)

## 2024-04-22 NOTE — PROGRESS NOTES
Preventive Care Visit  [facility]  Marco Bebeto Jolley MD, MD, Family Medicine  Apr 22, 2024      Assessment & Plan       ICD-10-CM    1. Routine general medical examination at a health care facility  Z00.00 Comprehensive metabolic panel (BMP + Alb, Alk Phos, ALT, AST, Total. Bili, TP)     Lipid panel reflex to direct LDL Fasting     Hemoglobin A1c     EKG 12-lead complete w/read - Clinics     CBC with platelets     TSH with free T4 reflex     CBC with platelets     TSH with free T4 reflex      2. Lightheadedness  R42 EKG 12-lead complete w/read - Clinics     CBC with platelets     TSH with free T4 reflex     CBC with platelets     TSH with free T4 reflex      3. Upper respiratory tract infection, unspecified type  J06.9       4. Essential hypertension, benign  I10 atenolol (TENORMIN) 50 MG tablet     lisinopril (ZESTRIL) 10 MG tablet      5. Mixed hyperlipidemia  E78.2 rosuvastatin (CRESTOR) 10 MG tablet      6. Glucose intolerance (impaired glucose tolerance)  R73.02 metFORMIN (GLUCOPHAGE XR) 500 MG 24 hr tablet     Continuous Blood Gluc Sensor (FREESTYLE ERIC 3 SENSOR) MISC      7. Need for vaccination  Z23 ZOSTER RECOMBINANT ADJUVANTED (SHINGRIX)     RSV VACCINE (ABRYSVO)            Reviewed recommended screenings and ordered appropriate testing for pt's risks and per pt's request(s).   Unclear etiology.  EKG today was reassuring.  Will check labs to further evaluate for other possible causes.  If these are all reassuring, could consider slight reduction in his antihypertensive regimen.  Encourage patient to be sure he is drinking plenty of fluids to stay hydrated.  Can consider further workup depending upon progression of symptoms and response.  Clinically consistent with viral process.  Recommended trial of Flonase to help with postnasal drainage and sore throat.  I did offer a prescription for albuterol inhaler to help with cough and wheezing (noted on exam when coughing today).   "Patient declined inhaler for now.  If not improving, he will let me know.  Clinically well-controlled, but may be contributing to lightheadedness above.  We could consider a reduction in his atenolol dose if ongoing symptoms.  Encourage patient to consider staying on his rosuvastatin given his strong family history of heart disease.  Will check monitoring labs today and consider further interventions based upon response.  Clinically stable.  Will continue current regimen and did give a prescription for a freestyle monitor as patient would like to check his blood sugars without fingersticks is much as possible.  Will see if this is covered by his insurance.  Immunized.      Portions of this note were completed using Dragon dictation software.  Although reviewed, there may be typographical and other inadvertent errors that remain.               BMI  Estimated body mass index is 31.57 kg/m  as calculated from the following:    Height as of this encounter: 1.778 m (5' 10\").    Weight as of this encounter: 99.8 kg (220 lb).   Weight management plan: Discussed healthy diet and exercise guidelines    Counseling  Appropriate preventive services were discussed with this patient, including applicable screening as appropriate for fall prevention, nutrition, physical activity, Tobacco-use cessation, weight loss and cognition.  Checklist reviewing preventive services available has been given to the patient.  Reviewed patient's diet, addressing concerns and/or questions.   He is at risk for lack of exercise and has been provided with information to increase physical activity for the benefit of his well-being.   He is at risk for psychosocial distress and has been provided with information to reduce risk.       Patient Instructions   For your drainage, try the Flonase or similar nasal steroid to help with your nasal congestion.  Can use twice daily if needed while you're ill. Let's see what your labs and EKG show about your " episodes.      IF this doesn't show any issues, you can cut your atenolol dose in half and see if that helps with things.      We'll see what your lipids look like.  Given your family history, I would be somewhat hesitant to permanently stop statin therapy.      We can also recheck your labs when you're off the medication.      If you do stop the statin, consider resuming the baby aspirin.    Contact us or return if questions or concerns.    Have a nice day!    Dr. Jolley     Preventive Care Advice   This is general advice given by our system to help you stay healthy. However, your care team may have specific advice just for you. Please talk to your care team about your preventive care needs.  Nutrition  Eat 5 or more servings of fruits and vegetables each day.  Try wheat bread, brown rice and whole grain pasta (instead of white bread, rice, and pasta).  Get enough calcium and vitamin D. Check the label on foods and aim for 100% of the RDA (recommended daily allowance).  Lifestyle  Exercise at least 150 minutes each week   (30 minutes a day, 5 days a week).  Do muscle strengthening activities 2 days a week. These help control your weight and prevent disease.  No smoking.  Wear sunscreen to prevent skin cancer.  Have a dental exam and cleaning every 6 months.  Yearly exams  See your health care team every year to talk about:  Any changes in your health.  Any medicines your care team has prescribed.  Preventive care, family planning, and ways to prevent chronic diseases.  Shots (vaccines)   HPV shots (up to age 26), if you've never had them before.  Hepatitis B shots (up to age 59), if you've never had them before.  COVID-19 shot: Get this shot when it's due.  Flu shot: Get a flu shot every year.  Tetanus shot: Get a tetanus shot every 10 years.  Pneumococcal, hepatitis A, and RSV shots: Ask your care team if you need these based on your risk.  Shingles shot (for age 50 and up).  General health tests  Diabetes  screening:  Starting at age 35, Get screened for diabetes at least every 3 years.  If you are younger than age 35, ask your care team if you should be screened for diabetes.  Cholesterol test: At age 39, start having a cholesterol test every 5 years, or more often if advised.  Bone density scan (DEXA): At age 50, ask your care team if you should have this scan for osteoporosis (brittle bones).  Hepatitis C: Get tested at least once in your life.  STIs (sexually transmitted infections)  Before age 24: Ask your care team if you should be screened for STIs.  After age 24: Get screened for STIs if you're at risk. You are at risk for STIs (including HIV) if:  You are sexually active with more than one person.  You don't use condoms every time.  You or a partner was diagnosed with a sexually transmitted infection.  If you are at risk for HIV, ask about PrEP medicine to prevent HIV.  Get tested for HIV at least once in your life, whether you are at risk for HIV or not.  Cancer screening tests  Cervical cancer screening: If you have a cervix, begin getting regular cervical cancer screening tests at age 21. Most people who have regular screenings with normal results can stop after age 65. Talk about this with your provider.  Breast cancer scan (mammogram): If you've ever had breasts, begin having regular mammograms starting at age 40. This is a scan to check for breast cancer.  Colon cancer screening: It is important to start screening for colon cancer at age 45.  Have a colonoscopy test every 10 years (or more often if you're at risk) Or, ask your provider about stool tests like a FIT test every year or Cologuard test every 3 years.  To learn more about your testing options, visit: https://[email or web address].pdf.  For help making a decision, visit: https://bit.ly/ff11425.  Prostate cancer screening test: If you have a prostate and are age 55 to 69, ask your provider if you would benefit from a yearly prostate cancer  screening test.  Lung cancer screening: If you are a current or former smoker age 50 to 80, ask your care team if ongoing lung cancer screenings are right for you.  For informational purposes only. Not to replace the advice of your health care provider. Copyright   2023 [employer]. All rights reserved. Clinically reviewed by the [facility]. [employer] [id number] - REV 01/24.    Learning About Stress  What is stress?     Stress is your body's response to a hard situation. Your body can have a physical, emotional, or mental response. Stress is a fact of life for most people, and it affects everyone differently. What causes stress for you may not be stressful for someone else.  A lot of things can cause stress. You may feel stress when you go on a job interview, take a test, or run a race. This kind of short-term stress is normal and even useful. It can help you if you need to work hard or react quickly. For example, stress can help you finish an important job on time.  Long-term stress is caused by ongoing stressful situations or events. Examples of long-term stress include long-term health problems, ongoing problems at work, or conflicts in your family. Long-term stress can harm your health.  How does stress affect your health?  When you are stressed, your body responds as though you are in danger. It makes hormones that speed up your heart, make you breathe faster, and give you a burst of energy. This is called the fight-or-flight stress response. If the stress is over quickly, your body goes back to normal and no harm is done.  But if stress happens too often or lasts too long, it can have bad effects. Long-term stress can make you more likely to get sick, and it can make symptoms of some diseases worse. If you tense up when you are stressed, you may develop neck, shoulder, or low back pain. Stress is linked to high blood pressure and heart disease.  Stress also harms your  emotional health. It can make you blood, tense, or depressed. Your relationships may suffer, and you may not do well at work or school.  What can you do to manage stress?  You can try these things to help manage stress:   Do something active. Exercise or activity can help reduce stress. Walking is a great way to get started. Even everyday activities such as housecleaning or yard work can help.  Try yoga or jacobo chi. These techniques combine exercise and meditation. You may need some training at first to learn them.  Do something you enjoy. For example, listen to music or go to a movie. Practice your hobby or do volunteer work.  Meditate. This can help you relax, because you are not worrying about what happened before or what may happen in the future.  Do guided imagery. Imagine yourself in any setting that helps you feel calm. You can use online videos, books, or a teacher to guide you.  Do breathing exercises. For example:  From a standing position, bend forward from the waist with your knees slightly bent. Let your arms dangle close to the floor.  Breathe in slowly and deeply as you return to a standing position. Roll up slowly and lift your head last.  Hold your breath for just a few seconds in the standing position.  Breathe out slowly and bend forward from the waist.  Let your feelings out. Talk, laugh, cry, and express anger when you need to. Talking with supportive friends or family, a counselor, or a tova leader about your feelings is a healthy way to relieve stress. Avoid discussing your feelings with people who make you feel worse.  Write. It may help to write about things that are bothering you. This helps you find out how much stress you feel and what is causing it. When you know this, you can find better ways to cope.  What can you do to prevent stress?  You might try some of these things to help prevent stress:  Manage your time. This helps you find time to do the things you want and need to do.  Get  "enough sleep. Your body recovers from the stresses of the day while you are sleeping.  Get support. Your family, friends, and community can make a difference in how you experience stress.  Limit your news feed. Avoid or limit time on social media or news that may make you feel stressed.  Do something active. Exercise or activity can help reduce stress. Walking is a great way to get started.  Where can you learn more?  Go to https://www.[employer].net/patiented  Enter N032 in the search box to learn more about \"Learning About Stress.\"  Current as of: October 24, 2023               Content Version: 14.0    8032-7261 [employer].   Care instructions adapted under license by your healthcare professional. If you have questions about a medical condition or this instruction, always ask your healthcare professional. [employer] disclaims any warranty or liability for your use of this information.      Rafael Calvillo is a 59 year old, presenting for the following:  Physical        4/22/2024     2:13 PM   Additional Questions   Roomed by endy faustin        Health Care Directive  Patient does not have a Health Care Directive or Living Will: Discussed advance care planning with patient; information given to patient to review.    HPI    Pt has had upper respiratory symptoms for about 2 weeks.  Started with a simple cold, but has ongoing sore throat.  Notes ongoing postnasal drainage.  No congestion/stuffiness.  Slight cough comes and goes.  Denies itchy/watery eyes.  Not really sneezing much.      Tried allergy medication without improvement.  Hasn't really tried anything else.      Pre-Diabetes Follow-up    How often are you checking your blood sugar? Not at all  What concerns do you have today about your diabetes? None   Do you have any of these symptoms? (Select all that apply)  No numbness or tingling in feet.  No redness, sores or blisters on feet.  No complaints of excessive thirst.  No " "reports of blurry vision.  No significant changes to weight.    Not really checking sugars.          Hyperlipidemia Follow-Up    Are you regularly taking any medication or supplement to lower your cholesterol?   Yes- Crestor  Are you having muscle aches or other side effects that you think could be caused by your cholesterol lowering medication?  Yes- muscle pains in legs    He had stopped taking his statin about 4-5 days ago.  He reports muscle aches with it.    He's not sure if his muscles hurt less since stopping this.       Hypertension Follow-up    Do you check your blood pressure regularly outside of the clinic? No   Are you following a low salt diet? Yes  Are your blood pressures ever more than 140 on the top number (systolic) OR more   than 90 on the bottom number (diastolic), for example 140/90? No    Pt has been feeling more lightheaded lately.  He feels the \"heebee-jeebies\" from his chest into his neck.  His BP was normal when he was feeling this.  Has been similar to recent checks.  No racing pulse or skipped beats associated with this.        He did some orthostatic BP checks in [place] when he was having symptoms.  He said his BP was a bit lower upon standing than sitting.  He drinks water better than he was before.  Has also been taking magnesium and calcium.  Was doing better with his leg cramps until 2 days ago.      BP Readings from Last 2 Encounters:   04/22/24 110/68   02/09/23 110/70     Hemoglobin A1C (%)   Date Value   04/22/2024 5.9 (H)   11/08/2023 6.0 (H)   07/08/2021 6.0 (H)   01/19/2021 6.0 (H)     LDL Cholesterol Calculated (mg/dL)   Date Value   02/09/2023 11   02/02/2022 37   01/13/2021 40   08/27/2013 86           4/21/2024   General Health   How would you rate your overall physical health? (!) FAIR   Feel stress (tense, anxious, or unable to sleep) Only a little   (!) STRESS CONCERN      4/21/2024   Nutrition   Three or more servings of calcium each day? Yes   Diet: Regular (no " "restrictions)   How many servings of fruit and vegetables per day? (!) 2-3   How many sweetened beverages each day? 0-1         4/21/2024   Exercise   Days per week of moderate/strenous exercise 3 days   Average minutes spent exercising at this level 20 min         4/21/2024   Social Factors   Frequency of gathering with friends or relatives Patient declined   Worry food won't last until get money to buy more No   Food not last or not have enough money for food? No   Do you have housing?  Yes   Are you worried about losing your housing? No   Lack of transportation? No   Unable to get utilities (heat,electricity)? No         4/21/2024   Fall Risk   Fallen 2 or more times in the past year? No   Trouble with walking or balance? No          4/21/2024   Dental   Dentist two times every year? Yes         4/21/2024   TB Screening   Were you born outside of the [place]? No         Today's PHQ-2 Score:       4/21/2024    11:18 PM   PHQ-2 ( 1999 [employer])   Q1: Little interest or pleasure in doing things 0   Q2: Feeling down, depressed or hopeless 0   PHQ-2 Score 0   Q1: Little interest or pleasure in doing things Not at all   Q2: Feeling down, depressed or hopeless Not at all   PHQ-2 Score 0           4/21/2024   Substance Use   Alcohol more than 3/day or more than 7/wk Not Applicable   Do you use any other substances recreationally? No     Social History     Tobacco Use    Smoking status: Never    Smokeless tobacco: Never    Tobacco comments:     9/7/04 no second hand smoke at home or work   Vaping Use    Vaping status: Never Used   Substance Use Topics    Alcohol use: No    Drug use: No           4/21/2024   STI Screening   New sexual partner(s) since last STI/HIV test? No   Last PSA: No results found for: \"PSA\"  ASCVD Risk   The ASCVD Risk score (Sigrid MCKEON, et al., 2019) failed to calculate for the following reasons:    The valid total cholesterol range is 130 to 320 mg/dL           Reviewed and updated as needed this " visit by Provider                    BP Readings from Last 3 Encounters:   04/22/24 110/68   02/09/23 110/70   01/07/23 120/79    Wt Readings from Last 3 Encounters:   04/22/24 99.8 kg (220 lb)   02/09/23 96.2 kg (212 lb)   01/07/23 96.2 kg (212 lb)                  Patient Active Problem List   Diagnosis    Essential hypertension, benign    Hypertensive retinopathy    FAMILY HISTORY OF CARDIOVASC DIS (ISCHEMIC)    Mixed hyperlipidemia    Obstructive sleep apnea    Glucose intolerance (impaired glucose tolerance)    Rash of hand     Past Surgical History:   Procedure Laterality Date    DECOMPRESSION LUMBAR ONE LEVEL  03/27/2013    Procedure: DECOMPRESSION LUMBAR ONE LEVEL;  Laminotomy Discectomy L4-5;  Surgeon: Sergei Abarca MD;  Location: [facility] OR    HC LAPAROSCOPY, SURGICAL; CHOLECYSTECTOMY  06/05/2008    MANDIBLE SURGERY      MENISCECTOMY Left     ORTHOPEDIC SURGERY  2017    Knee-meniscus removal    TONSILLECTOMY         Social History     Tobacco Use    Smoking status: Never    Smokeless tobacco: Never    Tobacco comments:     9/7/04 no second hand smoke at home or work   Substance Use Topics    Alcohol use: No     Family History   Problem Relation Age of Onset    Allergies Sister     Allergies Brother     Cancer Brother         Skin cancer    Cerebrovascular Disease Paternal Grandmother     C.A.D. Brother         stent  at age 42    Diabetes Mother     Hypertension Mother     Hyperlipidemia Mother     Alzheimer Disease Father     Cancer Father         Skin Cancer    Asthma Sister     Obesity Sister     Obesity Brother     Prostate Cancer No family hx of     Cancer - colorectal No family hx of     Breast Cancer No family hx of          Current Outpatient Medications   Medication Sig Dispense Refill    atenolol (TENORMIN) 50 MG tablet Take 1 tablet by mouth once daily 30 tablet 0    lisinopril (ZESTRIL) 10 MG tablet Take 1 tablet (10 mg) by mouth daily 90 tablet 1    metFORMIN (GLUCOPHAGE XR) 500 MG 24 hr  "tablet TAKE 1 TABLET BY MOUTH ONCE DAILY WITH SUPPER 30 tablet 0    methocarbamol (ROBAXIN) 750 MG tablet Take 1 tablet (750 mg) by mouth 3 times daily as needed for muscle spasms 30 tablet 0    rosuvastatin (CRESTOR) 10 MG tablet Take 1 tablet by mouth once daily 20 tablet 0    aspirin 81 MG tablet Take by mouth daily (Patient not taking: Reported on 2/9/2023) 100 tablet 3    ORDER FOR DME     Certification: Refill           Provide replacement interface, tubing and filter supplies as needed    Accessories:Chin strap, add in future at patient's request      Duration of need: 15 months  Diagnosis: RACHEAL  Instruction to vendor: Please provide patient with mask interface of patient's choice 1 each 0     Allergies   Allergen Reactions    Mold Other (See Comments)     hayfever symptoms    Ragweeds Other (See Comments)     hayfever symptoms     Recent Labs   Lab Test 04/22/24  1340 11/08/23  1407 07/13/23  1146 05/16/23  1348 02/09/23  1741 06/10/22  1155 02/02/22  0850 01/19/21  0905 01/13/21  1006   A1C 5.9* 6.0*  --  5.7* 6.5*   < > 6.2*   < >  --    LDL  --   --   --   --  11  --  37  --  40   HDL  --   --   --   --  23*  --  32*  --  35*   TRIG  --   --   --   --  340*  --  163*  --  209*   ALT  --  28  --  29 27  --  39   < > 48   CR  --  1.03 0.87 0.87 0.83  --  0.91   < > 0.89   GFRESTIMATED  --  84 >90 >90 >90  --  >90   < > >90   GFRESTBLACK  --   --   --   --   --   --   --   --  >90   POTASSIUM  --  4.2 4.2 4.3 3.3*   < > 3.7  --  3.4   TSH  --   --   --   --   --   --  2.40  --   --     < > = values in this interval not displayed.             Objective    Exam  /68 (BP Location: Right arm, Patient Position: Sitting, Cuff Size: Adult Regular)   Pulse 61   Temp 97.8  F (36.6  C) (Temporal)   Resp 18   Ht 1.778 m (5' 10\")   Wt 99.8 kg (220 lb)   SpO2 97%   BMI 31.57 kg/m     Estimated body mass index is 31.57 kg/m  as calculated from the following:    Height as of this encounter: 1.778 m (5' 10\").    " Weight as of this encounter: 99.8 kg (220 lb).    Physical Exam  GENERAL: alert and no distress  EYES: Eyes grossly normal to inspection, PERRL and conjunctivae and sclerae normal  HENT: ear canals and TM's normal, nose and mouth without ulcers or lesions  NECK: no adenopathy, no asymmetry, masses, or scars  RESP: lungs clear to auscultation - no rales, rhonchi or wheezes  CV: regular rate and rhythm, normal S1 S2, no S3 or S4, no murmur, click or rub, no peripheral edema  ABDOMEN: soft, nontender, no hepatosplenomegaly, no masses and bowel sounds normal  MS: no gross musculoskeletal defects noted, no edema  SKIN: no suspicious lesions or rashes  NEURO: Normal strength and tone, mentation intact and speech normal  PSYCH: mentation appears normal, affect normal/bright        Signed Electronically by: Marco Jolley MD, MD

## 2024-04-22 NOTE — PATIENT INSTRUCTIONS
For your drainage, try the Flonase or similar nasal steroid to help with your nasal congestion.  Can use twice daily if needed while you're ill. Let's see what your labs and EKG show about your episodes.      IF this doesn't show any issues, you can cut your atenolol dose in half and see if that helps with things.      We'll see what your lipids look like.  Given your family history, I would be somewhat hesitant to permanently stop statin therapy.      We can also recheck your labs when you're off the medication.      If you do stop the statin, consider resuming the baby aspirin.    Contact us or return if questions or concerns.    Have a nice day!    Dr. Jolley     Preventive Care Advice   This is general advice given by our system to help you stay healthy. However, your care team may have specific advice just for you. Please talk to your care team about your preventive care needs.  Nutrition  Eat 5 or more servings of fruits and vegetables each day.  Try wheat bread, brown rice and whole grain pasta (instead of white bread, rice, and pasta).  Get enough calcium and vitamin D. Check the label on foods and aim for 100% of the RDA (recommended daily allowance).  Lifestyle  Exercise at least 150 minutes each week   (30 minutes a day, 5 days a week).  Do muscle strengthening activities 2 days a week. These help control your weight and prevent disease.  No smoking.  Wear sunscreen to prevent skin cancer.  Have a dental exam and cleaning every 6 months.  Yearly exams  See your health care team every year to talk about:  Any changes in your health.  Any medicines your care team has prescribed.  Preventive care, family planning, and ways to prevent chronic diseases.  Shots (vaccines)   HPV shots (up to age 26), if you've never had them before.  Hepatitis B shots (up to age 59), if you've never had them before.  COVID-19 shot: Get this shot when it's due.  Flu shot: Get a flu shot every year.  Tetanus shot: Get a tetanus  shot every 10 years.  Pneumococcal, hepatitis A, and RSV shots: Ask your care team if you need these based on your risk.  Shingles shot (for age 50 and up).  General health tests  Diabetes screening:  Starting at age 35, Get screened for diabetes at least every 3 years.  If you are younger than age 35, ask your care team if you should be screened for diabetes.  Cholesterol test: At age 39, start having a cholesterol test every 5 years, or more often if advised.  Bone density scan (DEXA): At age 50, ask your care team if you should have this scan for osteoporosis (brittle bones).  Hepatitis C: Get tested at least once in your life.  STIs (sexually transmitted infections)  Before age 24: Ask your care team if you should be screened for STIs.  After age 24: Get screened for STIs if you're at risk. You are at risk for STIs (including HIV) if:  You are sexually active with more than one person.  You don't use condoms every time.  You or a partner was diagnosed with a sexually transmitted infection.  If you are at risk for HIV, ask about PrEP medicine to prevent HIV.  Get tested for HIV at least once in your life, whether you are at risk for HIV or not.  Cancer screening tests  Cervical cancer screening: If you have a cervix, begin getting regular cervical cancer screening tests at age 21. Most people who have regular screenings with normal results can stop after age 65. Talk about this with your provider.  Breast cancer scan (mammogram): If you've ever had breasts, begin having regular mammograms starting at age 40. This is a scan to check for breast cancer.  Colon cancer screening: It is important to start screening for colon cancer at age 45.  Have a colonoscopy test every 10 years (or more often if you're at risk) Or, ask your provider about stool tests like a FIT test every year or Cologuard test every 3 years.  To learn more about your testing options, visit: https://www.[employer]/576155.pdf.  For help making a  decision, visit: https://bit.ly/tx58695.  Prostate cancer screening test: If you have a prostate and are age 55 to 69, ask your provider if you would benefit from a yearly prostate cancer screening test.  Lung cancer screening: If you are a current or former smoker age 50 to 80, ask your care team if ongoing lung cancer screenings are right for you.  For informational purposes only. Not to replace the advice of your health care provider. Copyright   2023 [employer]. All rights reserved. Clinically reviewed by the  [employer] [facility]. [employer] 771631 - REV 01/24.    Learning About Stress  What is stress?     Stress is your body's response to a hard situation. Your body can have a physical, emotional, or mental response. Stress is a fact of life for most people, and it affects everyone differently. What causes stress for you may not be stressful for someone else.  A lot of things can cause stress. You may feel stress when you go on a job interview, take a test, or run a race. This kind of short-term stress is normal and even useful. It can help you if you need to work hard or react quickly. For example, stress can help you finish an important job on time.  Long-term stress is caused by ongoing stressful situations or events. Examples of long-term stress include long-term health problems, ongoing problems at work, or conflicts in your family. Long-term stress can harm your health.  How does stress affect your health?  When you are stressed, your body responds as though you are in danger. It makes hormones that speed up your heart, make you breathe faster, and give you a burst of energy. This is called the fight-or-flight stress response. If the stress is over quickly, your body goes back to normal and no harm is done.  But if stress happens too often or lasts too long, it can have bad effects. Long-term stress can make you more likely to get sick, and it can make symptoms of some  diseases worse. If you tense up when you are stressed, you may develop neck, shoulder, or low back pain. Stress is linked to high blood pressure and heart disease.  Stress also harms your emotional health. It can make you blood, tense, or depressed. Your relationships may suffer, and you may not do well at work or school.  What can you do to manage stress?  You can try these things to help manage stress:   Do something active. Exercise or activity can help reduce stress. Walking is a great way to get started. Even everyday activities such as housecleaning or yard work can help.  Try yoga or jacobo chi. These techniques combine exercise and meditation. You may need some training at first to learn them.  Do something you enjoy. For example, listen to music or go to a movie. Practice your hobby or do volunteer work.  Meditate. This can help you relax, because you are not worrying about what happened before or what may happen in the future.  Do guided imagery. Imagine yourself in any setting that helps you feel calm. You can use online videos, books, or a teacher to guide you.  Do breathing exercises. For example:  From a standing position, bend forward from the waist with your knees slightly bent. Let your arms dangle close to the floor.  Breathe in slowly and deeply as you return to a standing position. Roll up slowly and lift your head last.  Hold your breath for just a few seconds in the standing position.  Breathe out slowly and bend forward from the waist.  Let your feelings out. Talk, laugh, cry, and express anger when you need to. Talking with supportive friends or family, a counselor, or a tova leader about your feelings is a healthy way to relieve stress. Avoid discussing your feelings with people who make you feel worse.  Write. It may help to write about things that are bothering you. This helps you find out how much stress you feel and what is causing it. When you know this, you can find better ways to  "cope.  What can you do to prevent stress?  You might try some of these things to help prevent stress:  Manage your time. This helps you find time to do the things you want and need to do.  Get enough sleep. Your body recovers from the stresses of the day while you are sleeping.  Get support. Your family, friends, and community can make a difference in how you experience stress.  Limit your news feed. Avoid or limit time on social media or news that may make you feel stressed.  Do something active. Exercise or activity can help reduce stress. Walking is a great way to get started.  Where can you learn more?  Go to https://www.[employer]/patiented  Enter N032 in the search box to learn more about \"Learning About Stress.\"  Current as of: October 24, 2023               Content Version: 14.0    1492-7383 [employer].   Care instructions adapted under license by your healthcare professional. If you have questions about a medical condition or this instruction, always ask your healthcare professional. [employer], [employer] disclaims any warranty or liability for your use of this information.      "

## 2024-04-23 NOTE — RESULT ENCOUNTER NOTE
Rajinder,    Your blood sugar control is acceptable.  Continue current regimen.  Your cholesterol numbers have dramatically worsened after just a few days off your cholesterol medication.  I would recommend resuming cholesterol medication as your 10-year risk of having a heart attack or stroke is nearly 10%.  We certainly could try a different statin medication if you are having side effects that you are concerned about.    All of your other labs are stable.    Have a nice day!    Dr. Jolley      The 10-year ASCVD risk score (Sigrid MCKEON, et al., 2019) is: 9.2%    Values used to calculate the score:      Age: 59 years      Sex: Male      Is Non- : No      Diabetic: No      Tobacco smoker: No      Systolic Blood Pressure: 110 mmHg      Is BP treated: Yes      HDL Cholesterol: 36 mg/dL      Total Cholesterol: 186 mg/dL

## 2024-05-18 ENCOUNTER — MYC MEDICAL ADVICE (OUTPATIENT)
Dept: FAMILY MEDICINE | Facility: OTHER | Age: 59
End: 2024-05-18
Payer: COMMERCIAL

## 2024-05-18 DIAGNOSIS — E78.2 MIXED HYPERLIPIDEMIA: ICD-10-CM

## 2024-05-18 DIAGNOSIS — I10 ESSENTIAL HYPERTENSION, BENIGN: ICD-10-CM

## 2024-05-18 DIAGNOSIS — R73.02 GLUCOSE INTOLERANCE (IMPAIRED GLUCOSE TOLERANCE): ICD-10-CM

## 2024-05-20 RX ORDER — LISINOPRIL 10 MG/1
10 TABLET ORAL DAILY
Qty: 90 TABLET | Refills: 3 | Status: SHIPPED | OUTPATIENT
Start: 2024-05-20 | End: 2024-07-11

## 2024-05-20 RX ORDER — METFORMIN HCL 500 MG
TABLET, EXTENDED RELEASE 24 HR ORAL
Qty: 90 TABLET | Refills: 1 | Status: SHIPPED | OUTPATIENT
Start: 2024-05-20

## 2024-05-20 RX ORDER — ROSUVASTATIN CALCIUM 10 MG/1
10 TABLET, COATED ORAL DAILY
Qty: 90 TABLET | Refills: 3 | Status: SHIPPED | OUTPATIENT
Start: 2024-05-20

## 2024-05-26 ENCOUNTER — TRANSFERRED RECORDS (OUTPATIENT)
Dept: HEALTH INFORMATION MANAGEMENT | Facility: CLINIC | Age: 59
End: 2024-05-26
Payer: COMMERCIAL

## 2024-05-26 LAB — RETINOPATHY: NEGATIVE

## 2024-06-27 ENCOUNTER — MYC MEDICAL ADVICE (OUTPATIENT)
Dept: FAMILY MEDICINE | Facility: OTHER | Age: 59
End: 2024-06-27
Payer: COMMERCIAL

## 2024-06-28 ENCOUNTER — VIRTUAL VISIT (OUTPATIENT)
Dept: URGENT CARE | Facility: CLINIC | Age: 59
End: 2024-06-28
Payer: COMMERCIAL

## 2024-06-28 DIAGNOSIS — M10.9 GOUTY ARTHRITIS OF LEFT GREAT TOE: Primary | ICD-10-CM

## 2024-06-28 PROCEDURE — 99207 PR NO CHARGE LOS MISSING/LACKING DOCUMENTATION: CPT | Performed by: EMERGENCY MEDICINE

## 2024-06-28 RX ORDER — COLCHICINE 0.6 MG/1
TABLET ORAL
Qty: 3 TABLET | Refills: 0 | Status: SHIPPED | OUTPATIENT
Start: 2024-06-28 | End: 2024-07-22

## 2024-06-28 RX ORDER — PREDNISONE 50 MG/1
50 TABLET ORAL DAILY
Qty: 5 TABLET | Refills: 0 | Status: SHIPPED | OUTPATIENT
Start: 2024-06-28 | End: 2024-07-03

## 2024-06-28 RX ORDER — INDOMETHACIN 50 MG/1
50 CAPSULE ORAL
Qty: 21 CAPSULE | Refills: 0 | Status: SHIPPED | OUTPATIENT
Start: 2024-06-28 | End: 2024-07-05

## 2024-06-28 NOTE — PROGRESS NOTES
Phone appointment:  Duration: 5 minutes        CHIEF COMPLAINT: Acute gout attack left great toe      HPI: Patient is a 59-year-old male who has a history of gout is now developed a flare involving his left great toe.  New onset gout with first episode 1/7/2023.  At that time his uric acid was slightly elevated.  He had no attacks for over a year with this being the second attack only.      ROS: See HPI otherwise normal.    Allergies   Allergen Reactions    Mold Other (See Comments)     hayfever symptoms    Ragweeds Other (See Comments)     hayfever symptoms      Current Outpatient Medications   Medication Sig Dispense Refill    colchicine (COLCRYS) 0.6 MG tablet Take 2 tablets now and repeat 1 tablet in 1 hour. 3 tablet 0    indomethacin (INDOCIN) 50 MG capsule Take 1 capsule (50 mg) by mouth 3 times daily (with meals) for 7 days 21 capsule 0    predniSONE (DELTASONE) 50 MG tablet Take 1 tablet (50 mg) by mouth daily for 5 days 5 tablet 0    aspirin 81 MG tablet Take by mouth daily (Patient not taking: Reported on 2/9/2023) 100 tablet 3    atenolol (TENORMIN) 50 MG tablet Take 1 tablet (50 mg) by mouth daily 90 tablet 3    Continuous Blood Gluc Sensor (FREESTYLE ERIC 3 SENSOR) MISC 1 each every 14 days 6 each 3    lisinopril (ZESTRIL) 10 MG tablet Take 1 tablet (10 mg) by mouth daily 90 tablet 3    metFORMIN (GLUCOPHAGE XR) 500 MG 24 hr tablet TAKE 1 TABLET BY MOUTH ONCE DAILY WITH SUPPER 90 tablet 1    methocarbamol (ROBAXIN) 750 MG tablet Take 1 tablet (750 mg) by mouth 3 times daily as needed for muscle spasms 30 tablet 0    ORDER FOR DME     Certification: Refill           Provide replacement interface, tubing and filter supplies as needed    Accessories:Chin strap, add in future at patient's request      Duration of need: 15 months  Diagnosis: RACHEAL  Instruction to vendor: Please provide patient with mask interface of patient's choice 1 each 0    rosuvastatin (CRESTOR) 10 MG tablet Take 1 tablet (10 mg) by  mouth daily 90 tablet 3         PE: No acute distress on phone appointment.  He is alert and oriented.        TREATMENT: None.      ASSESSMENT: Acute gout attack left great toe.  History and symptoms sound consistent.  Patient's uric acid 1 year ago was 7.3 which is only slightly elevated and due to the fact the patient has not had frequent episodes I do not feel the need to repeat his uric acid level or implement allopurinol.  Patient is to follow-up with his PCP in 1 week if symptoms      DIAGNOSIS: Gout left      PLAN: Colchicine, prednisone, Indocin warm soaks.  Follow-up PCP in 1 week if send it improved, sooner

## 2024-07-06 ENCOUNTER — MYC MEDICAL ADVICE (OUTPATIENT)
Dept: FAMILY MEDICINE | Facility: OTHER | Age: 59
End: 2024-07-06
Payer: COMMERCIAL

## 2024-07-08 ENCOUNTER — NURSE TRIAGE (OUTPATIENT)
Dept: FAMILY MEDICINE | Facility: OTHER | Age: 59
End: 2024-07-08
Payer: COMMERCIAL

## 2024-07-08 NOTE — TELEPHONE ENCOUNTER
Patient was only to reduce his atenolol dose in half.  It sounds like he stopped it completely?    I would probably recommend staying at 20 mg of lisinopril for now, but consider resuming half dose of atenolol.  Continue to monitor blood pressures.  Return to clinic if any questions or concerns.

## 2024-07-08 NOTE — TELEPHONE ENCOUNTER
Called patient from [facility] message regarding elevated BP      Patient states at 4/22 appointment he and PCP talked about weaning off of his atenolol. He cut his pills in half. He reports he completed this taper and was not taking any more atenolol since  about a week ago.   On Saturday patient had a headache and his BP was 170/90. He states he called  and spoke with a provider who told him to take an extra half of his lisinopril pills. So he took 15mg but his BP did not decrease and still had a headache. So he took the other half. Patient has taken 20mg of his lisinopril for two days now and states his headache is better. Patient did not note his heart rate with these blood pressures. Patients BP today 140/80. He is wondering if he should continue taking the 20mg or what PCP would advise.    Will route to PCP to advise.    Judi Goff RN on 7/8/2024 at 4:43 PM

## 2024-07-08 NOTE — TELEPHONE ENCOUNTER
Attempt #1 to call patient.     RN left voicemail and requested return call to [facility] at [phone]    Montse Lim RN  [facility]: [place]

## 2024-07-08 NOTE — TELEPHONE ENCOUNTER
Called patient for clarification. See telephone encounter.    Judi Goff RN on 7/8/2024 at 4:44 PM

## 2024-07-09 NOTE — TELEPHONE ENCOUNTER
Nurse Triage SBAR    Is this a 2nd Level Triage? NO    Situation: Patient stopped atenolol previously and now increased BP on lisinopril. See encounter 7/8 regarding Dr. Jolley recommendations.  Background: UC 7/6 and doubled lisinopril.     Assessment: Patient requesting appointment to be seen, dizziness, headaches and fatigue even prior to UC. Denies fever, CP, SOB, current HA, vision changes, N&V.   Protocol Recommended Disposition:   See PCP Within 3 Days    Appointments in Next Year      Jul 11, 2024 11:30 AM  (Arrive by 11:10 AM)  Provider Visit with CEE Blue CNP  [facility] ([facility] [place] ) [phone]            Recommendation:Reviewed red flag and when to be see more urgently evaluation such as ED. Patient verbalized understanding and all questions answered.     Routed to provider    Does the patient meet one of the following criteria for ADS visit consideration? 16+ years old, with an [facility] PCP     Griselda Copeland RN  [facility] - Registered Nurse  Clinic Triage [place]   July 9, 2024

## 2024-07-09 NOTE — TELEPHONE ENCOUNTER
Called and spoke with patient.   No openings in schedule with PCP. Patient can come Thursday and 10 am. Appointment scheduled.     Appointments in Next Year      Jul 11, 2024 10:00 AM  (Arrive by 9:40 AM)  Provider Visit with Marco Jolley MD  [facility] ([facility] [place] ) [phone]          Taylor ENAMORADON, RN

## 2024-07-09 NOTE — TELEPHONE ENCOUNTER
"Reason for Disposition    [1] MILD dizziness (e.g., walking normally) AND [2] has NOT been evaluated by doctor (or NP/PA) for this  (Exception: Dizziness caused by heat exposure, sudden standing, or poor fluid intake.)    Additional Information    Negative: SEVERE difficulty breathing (e.g., struggling for each breath, speaks in single words)    Negative: [1] Difficulty breathing or swallowing AND [2] started suddenly after medicine, an allergic food or bee sting    Negative: Shock suspected (e.g., cold/pale/clammy skin, too weak to stand, low BP, rapid pulse)    Negative: Difficult to awaken or acting confused (e.g., disoriented, slurred speech)    Negative: [1] Weakness (i.e., paralysis, loss of muscle strength) of the face, arm or leg on one side of the body AND [2] sudden onset AND [3] present now    Negative: [1] Numbness (i.e., loss of sensation) of the face, arm or leg on one side of the body AND [2] sudden onset AND [3] present now    Negative: [1] Loss of speech or garbled speech AND [2] sudden onset AND [3] present now    Negative: Overdose (accidental or intentional) of medications    Negative: [1] Fainted > 15 minutes ago AND [2] still feels too weak or dizzy to stand    Negative: Heart beating < 50 beats per minute OR > 140 beats per minute    Negative: Sounds like a life-threatening emergency to the triager    Negative: Chest pain    Negative: Rectal bleeding, bloody stool, or tarry-black stool    Negative: [1] Vomiting AND [2] contains red blood or black (\"coffee ground\") material    Negative: Vomiting is main symptom    Negative: Diarrhea is main symptom    Negative: Headache is main symptom    Negative: Patient states that they are having an anxiety or panic attack    Negative: Dizziness from low blood sugar (i.e., < 60 mg/dl or 3.5 mmol/l)    Negative: Dizziness is described as a spinning sensation (i.e., vertigo)    Negative: Heat exhaustion suspected (i.e., dehydration from heat exposure)    " "Negative: Difficulty breathing    Negative: SEVERE dizziness (e.g., unable to stand, requires support to walk, feels like passing out now)    Negative: Extra heartbeats, irregular heart beating, or heart is beating very fast  (i.e., \"palpitations\")    Negative: [1] Drinking very little AND [2] dehydration suspected (e.g., no urine > 12 hours, very dry mouth, very lightheaded)    Negative: [1] Weakness (i.e., paralysis, loss of muscle strength) of the face, arm / hand, or leg / foot on one side of the body AND [2] sudden onset AND [3] brief (now gone)    Negative: [1] Numbness (i.e., loss of sensation) of the face, arm / hand, or leg / foot on one side of the body AND [2] sudden onset AND [3] brief (now gone)    Negative: [1] Loss of speech or garbled speech AND [2] sudden onset AND [3] brief (now gone)    Negative: Loss of vision or double vision  (Exception: Similar to previous migraines.)    Negative: Patient sounds very sick or weak to the triager    Negative: [1] Dizziness caused by heat exposure, sudden standing, or poor fluid intake AND [2] no improvement after 2 hours of rest and fluids    Negative: [1] Fever > 103 F (39.4 C) AND [2] not able to get the fever down using Fever Care Advice    Negative: [1] Fever > 101 F (38.3 C) AND [2] age > 60 years    Negative: [1] Fever > 100.0 F (37.8 C) AND [2] bedridden (e.g., CVA, chronic illness, recovering from surgery)    Negative: [1] Fever > 100.0 F (37.8 C) AND [2] diabetes mellitus or weak immune system (e.g., HIV positive, cancer chemo, splenectomy, organ transplant, chronic steroids)    Negative: [1] MODERATE dizziness (e.g., interferes with normal activities) AND [2] has NOT been evaluated by doctor (or NP/PA) for this  (Exception: Dizziness caused by heat exposure, sudden standing, or poor fluid intake.)    Negative: Fever present > 3 days (72 hours)    Negative: Taking a medicine that could cause dizziness (e.g., blood pressure medications, diuretics)    " Negative: [1] MODERATE dizziness (e.g., interferes with normal activities) AND [2] has been evaluated by doctor (or NP/PA) for this    Protocols used: Dizziness - [id number]

## 2024-07-09 NOTE — TELEPHONE ENCOUNTER
Cut lisinopril back to 15 mg daily (especially if he has resumed half dose of atenolol).  OK to schedule in any open spot with me in the next 3 days.  If no availability, can see at 10 AM on Thursday or 3:30 on Wednesday.

## 2024-07-09 NOTE — TELEPHONE ENCOUNTER
Patient phoned back and [facility] relayed message from MD Jolley.  Patient states that he will call clinic back tomorrow.    Mary Ruiz RN/FNA

## 2024-07-11 ENCOUNTER — OFFICE VISIT (OUTPATIENT)
Dept: FAMILY MEDICINE | Facility: OTHER | Age: 59
End: 2024-07-11
Payer: COMMERCIAL

## 2024-07-11 VITALS
RESPIRATION RATE: 19 BRPM | TEMPERATURE: 98.3 F | WEIGHT: 221.5 LBS | OXYGEN SATURATION: 99 % | DIASTOLIC BLOOD PRESSURE: 80 MMHG | BODY MASS INDEX: 32.81 KG/M2 | HEART RATE: 75 BPM | SYSTOLIC BLOOD PRESSURE: 128 MMHG | HEIGHT: 69 IN

## 2024-07-11 DIAGNOSIS — R73.02 GLUCOSE INTOLERANCE (IMPAIRED GLUCOSE TOLERANCE): ICD-10-CM

## 2024-07-11 DIAGNOSIS — R00.0 TACHYCARDIA: ICD-10-CM

## 2024-07-11 DIAGNOSIS — R20.0 NUMBNESS: ICD-10-CM

## 2024-07-11 DIAGNOSIS — I20.89 ANGINAL EQUIVALENT (H): Primary | ICD-10-CM

## 2024-07-11 DIAGNOSIS — R61 DIAPHORESIS: ICD-10-CM

## 2024-07-11 DIAGNOSIS — R22.1 NECK MASS: ICD-10-CM

## 2024-07-11 DIAGNOSIS — I10 ESSENTIAL HYPERTENSION, BENIGN: ICD-10-CM

## 2024-07-11 DIAGNOSIS — M62.838 MUSCLE SPASM: ICD-10-CM

## 2024-07-11 LAB
ANION GAP SERPL CALCULATED.3IONS-SCNC: 10 MMOL/L (ref 7–15)
BUN SERPL-MCNC: 17.6 MG/DL (ref 8–23)
CALCIUM SERPL-MCNC: 8.9 MG/DL (ref 8.8–10.2)
CHLORIDE SERPL-SCNC: 107 MMOL/L (ref 98–107)
CREAT SERPL-MCNC: 0.98 MG/DL (ref 0.67–1.17)
DEPRECATED HCO3 PLAS-SCNC: 24 MMOL/L (ref 22–29)
EGFRCR SERPLBLD CKD-EPI 2021: 88 ML/MIN/1.73M2
GLUCOSE SERPL-MCNC: 127 MG/DL (ref 70–99)
HBA1C MFR BLD: 6.3 % (ref 0–5.6)
MAGNESIUM SERPL-MCNC: 2 MG/DL (ref 1.7–2.3)
POTASSIUM SERPL-SCNC: 4.1 MMOL/L (ref 3.4–5.3)
SODIUM SERPL-SCNC: 141 MMOL/L (ref 135–145)
VIT B12 SERPL-MCNC: 1832 PG/ML (ref 232–1245)

## 2024-07-11 PROCEDURE — 82607 VITAMIN B-12: CPT | Performed by: FAMILY MEDICINE

## 2024-07-11 PROCEDURE — 80048 BASIC METABOLIC PNL TOTAL CA: CPT | Performed by: FAMILY MEDICINE

## 2024-07-11 PROCEDURE — 83735 ASSAY OF MAGNESIUM: CPT | Performed by: FAMILY MEDICINE

## 2024-07-11 PROCEDURE — 36415 COLL VENOUS BLD VENIPUNCTURE: CPT | Performed by: FAMILY MEDICINE

## 2024-07-11 PROCEDURE — 99214 OFFICE O/P EST MOD 30 MIN: CPT | Performed by: FAMILY MEDICINE

## 2024-07-11 PROCEDURE — 83036 HEMOGLOBIN GLYCOSYLATED A1C: CPT | Performed by: FAMILY MEDICINE

## 2024-07-11 RX ORDER — LISINOPRIL 20 MG/1
20 TABLET ORAL DAILY
Qty: 90 TABLET | Refills: 0 | Status: SHIPPED | OUTPATIENT
Start: 2024-07-11 | End: 2024-10-02

## 2024-07-11 NOTE — PROGRESS NOTES
Assessment & Plan       ICD-10-CM    1. Anginal equivalent (H24)  I20.89 Echocardiogram Exercise Stress      2. Diaphoresis  R61 Echocardiogram Exercise Stress      3. Numbness  R20.0 Vitamin B12     Echocardiogram Exercise Stress     CT Soft Tissue Neck w Contrast     Vitamin B12      4. Tachycardia  R00.0 Echocardiogram Exercise Stress      5. Muscle spasm  M62.838 Magnesium     Echocardiogram Exercise Stress     CT Soft Tissue Neck w Contrast     Magnesium      6. Glucose intolerance (impaired glucose tolerance)  R73.02 HEMOGLOBIN A1C     Vitamin B12     HEMOGLOBIN A1C     Vitamin B12      7. Essential hypertension, benign  I10 lisinopril (ZESTRIL) 20 MG tablet     Basic metabolic panel  (Ca, Cl, CO2, Creat, Gluc, K, Na, BUN)     Magnesium     Echocardiogram Exercise Stress     Basic metabolic panel  (Ca, Cl, CO2, Creat, Gluc, K, Na, BUN)     Magnesium      8. Neck mass  R22.1 CT Soft Tissue Neck w Contrast           1, 2, 3, 4.  Exact etiology of his symptoms is not clear.  I am slightly suspicious of a neurologic condition, but I am not convinced.  Will obtain some labs to further evaluate.  Discussed that I am happy to refer to neurology.  Patient is not certain he wishes to pursue that at this time.  This could also be an atypical anginal equivalent.  Because of this and his risk factors, will obtain a stress test to further evaluate.  If reassuring, would have low threshold for referral to neurology.  Follow-up at next visit.  Sooner if any worsening symptoms.  5.  Unclear etiology.  Trial of magnesium.  Will check some labs to further evaluate.  6.  Due for labs.  Will recheck A1c.  Continue efforts at lifestyle modification and continued use of metformin.  May need to adjust dose over time if needed.  7.  Clinically stable.  Will continue current regimen even though it is not exactly what I intended.  This does appear to be working well for patient and does not seem to clearly be contributing to his  atypical symptoms above.  Will check monitoring labs.  8.  This may be benign as it has been present for some time, but given atypical nature, will obtain imaging to further evaluate.        Portions of this note were completed using Dragon dictation software.  Although reviewed, there may be typographical and other inadvertent errors that remain.               Patient Instructions   Let's see what your labs show.    If these are OK, we can consider neurology consult.      We can also do a stress test to evaluate for possible cardiac causes.    We'll also check a neck CT scan to evaluate your lump.      We will let you know your results as soon as we can.  If you have MyChart, you will be able to see your lab and imaging results shortly after they become available.  I will review these and let you know my interpretation, but this usually takes additional time.  If you have multiple labs, I usually wait until they are all back before sending a note about them unless something is worrisome.  If you do not have MyChart, we will let you know your lab results as soon as we can.  If they are normal, this can take up to a week.    Contact us or return if questions or concerns.    Have a nice day!    Dr. Shola Hall   Rajinder is a 59 year old, presenting for the following health issues:  Hypertension      7/11/2024    10:03 AM   Additional Questions   Roomed by razia collins   Accompanied by self         7/11/2024    10:03 AM   Patient Reported Additional Medications   Patient reports taking the following new medications atenolol 25mg - hasn't taken for 2 weeks, linsinopril - 20 mg daily     History of Present Illness       Hypertension: He presents for follow up of hypertension.  He does check blood pressure  regularly outside of the clinic. Outside blood pressures have been over 140/90. He follows a low salt diet.     He eats 2-3 servings of fruits and vegetables daily.He consumes 0 sweetened beverage(s) daily.He  "exercises with enough effort to increase his heart rate 10 to 19 minutes per day.  He exercises with enough effort to increase his heart rate 4 days per week.   He is taking medications regularly.     Pt misunderstood and weaned off atenolol.  BP increased about 2 weeks later.  His longstanding lightheadedness worsened about a week ago when he got a HA.  He was seen in , and then recommend he increase his lisinopril to 15-20 mg daily.   Since his BP got under better control, his HA has resolved.      Continues to feel a sensation of pressure like a beanie cap on his head.  Currently, his lightheadedness is back to baseline.  Denies tingling, speech changes.  Has some slight numbness in his head.      In his back, has a sensation of buzzing/vibration, like a lose current TENS unit.  Can get it to go away by adjusting his position.      Noted some slight tachycardia when his BP was high.  No correlation of tachycardia with his other symptoms.      His symptoms may be a bit worse with activity.      His work schedule doesn't really accommodate taking medications every 12 hours very well.      Notes a lump in his right neck that has been present for at least a year.                Objective    /80   Pulse 75   Temp 98.3  F (36.8  C) (Temporal)   Resp 19   Ht 1.764 m (5' 9.45\")   Wt 100.5 kg (221 lb 8 oz)   SpO2 99%   BMI 32.29 kg/m    Body mass index is 32.29 kg/m .  Physical Exam   GENERAL: alert and no distress  NECK: no adenopathy, no asymmetry, masses, or scars  RESP: lungs clear to auscultation - no rales, rhonchi or wheezes  CV: regular rate and rhythm, normal S1 S2, no S3 or S4, no murmur, click or rub, no peripheral edema  ABDOMEN: soft, nontender, no hepatosplenomegaly, no masses and bowel sounds normal  MS: no gross musculoskeletal defects noted, no edema  NEURO: Normal strength and tone, sensory exam grossly normal, light touch and pinprick normal, mentation intact, cranial nerves 2-12 intact, " DTR's 1\+ and symmetric, gait normal including heel/toe/tandem walking, and rapid alternating movements normal    Transferred Records on 05/26/2024   Component Date Value Ref Range Status    RETINOPATHY 05/26/2024 NEGATIVE   Final     No results found for any visits on 07/11/24.  No results found for this or any previous visit (from the past 24 hour(s)).        Signed Electronically by: Marco Jolley MD, MD

## 2024-07-11 NOTE — PATIENT INSTRUCTIONS
Let's see what your labs show.    If these are OK, we can consider neurology consult.      We can also do a stress test to evaluate for possible cardiac causes.    We'll also check a neck CT scan to evaluate your lump.      We will let you know your results as soon as we can.  If you have MyChart, you will be able to see your lab and imaging results shortly after they become available.  I will review these and let you know my interpretation, but this usually takes additional time.  If you have multiple labs, I usually wait until they are all back before sending a note about them unless something is worrisome.  If you do not have MyChart, we will let you know your lab results as soon as we can.  If they are normal, this can take up to a week.    Contact us or return if questions or concerns.    Have a nice day!    Dr. Jolley

## 2024-07-15 ENCOUNTER — HOSPITAL ENCOUNTER (OUTPATIENT)
Dept: CARDIOLOGY | Facility: CLINIC | Age: 59
Discharge: HOME | End: 2024-07-15
Attending: FAMILY MEDICINE | Admitting: FAMILY MEDICINE
Payer: COMMERCIAL

## 2024-07-15 DIAGNOSIS — M62.838 MUSCLE SPASM: ICD-10-CM

## 2024-07-15 DIAGNOSIS — I20.89 ANGINAL EQUIVALENT (H): ICD-10-CM

## 2024-07-15 DIAGNOSIS — R00.0 TACHYCARDIA: ICD-10-CM

## 2024-07-15 DIAGNOSIS — R20.0 NUMBNESS: ICD-10-CM

## 2024-07-15 DIAGNOSIS — R61 DIAPHORESIS: ICD-10-CM

## 2024-07-15 DIAGNOSIS — I10 ESSENTIAL HYPERTENSION, BENIGN: ICD-10-CM

## 2024-07-15 PROCEDURE — 93325 DOPPLER ECHO COLOR FLOW MAPG: CPT | Mod: TC

## 2024-07-15 PROCEDURE — 93321 DOPPLER ECHO F-UP/LMTD STD: CPT | Mod: 26 | Performed by: INTERNAL MEDICINE

## 2024-07-15 PROCEDURE — 93016 CV STRESS TEST SUPVJ ONLY: CPT | Performed by: INTERNAL MEDICINE

## 2024-07-15 PROCEDURE — 93325 DOPPLER ECHO COLOR FLOW MAPG: CPT | Mod: 26 | Performed by: INTERNAL MEDICINE

## 2024-07-15 PROCEDURE — 255N000002 HC RX 255 OP 636: Performed by: FAMILY MEDICINE

## 2024-07-15 PROCEDURE — 93018 CV STRESS TEST I&R ONLY: CPT | Performed by: INTERNAL MEDICINE

## 2024-07-15 PROCEDURE — 93350 STRESS TTE ONLY: CPT | Mod: 26 | Performed by: INTERNAL MEDICINE

## 2024-07-15 RX ADMIN — PERFLUTREN 4 ML: 6.52 INJECTION, SUSPENSION INTRAVENOUS at 14:31

## 2024-07-15 NOTE — RESULT ENCOUNTER NOTE
Rajinder,      Your stress test was normal.  No evidence of cardiac disease causing your symptoms.    Have a nice day!    Dr. Jolley

## 2024-07-19 ENCOUNTER — MYC MEDICAL ADVICE (OUTPATIENT)
Dept: FAMILY MEDICINE | Facility: OTHER | Age: 59
End: 2024-07-19
Payer: COMMERCIAL

## 2024-07-19 DIAGNOSIS — M10.9 GOUTY ARTHRITIS OF LEFT GREAT TOE: ICD-10-CM

## 2024-07-22 RX ORDER — COLCHICINE 0.6 MG/1
TABLET ORAL
Qty: 3 TABLET | Refills: 0 | Status: SHIPPED | OUTPATIENT
Start: 2024-07-22

## 2024-07-25 ENCOUNTER — MYC MEDICAL ADVICE (OUTPATIENT)
Dept: FAMILY MEDICINE | Facility: OTHER | Age: 59
End: 2024-07-25
Payer: COMMERCIAL

## 2024-07-29 ENCOUNTER — HOSPITAL ENCOUNTER (OUTPATIENT)
Dept: CT IMAGING | Facility: CLINIC | Age: 59
Discharge: HOME | End: 2024-07-29
Attending: FAMILY MEDICINE | Admitting: FAMILY MEDICINE
Payer: COMMERCIAL

## 2024-07-29 DIAGNOSIS — R22.1 NECK MASS: ICD-10-CM

## 2024-07-29 DIAGNOSIS — R20.0 NUMBNESS: ICD-10-CM

## 2024-07-29 DIAGNOSIS — M62.838 MUSCLE SPASM: ICD-10-CM

## 2024-07-29 PROCEDURE — 70491 CT SOFT TISSUE NECK W/DYE: CPT

## 2024-07-29 PROCEDURE — 250N000011 HC RX IP 250 OP 636: Performed by: FAMILY MEDICINE

## 2024-07-29 PROCEDURE — 250N000009 HC RX 250: Performed by: FAMILY MEDICINE

## 2024-07-29 RX ORDER — IOPAMIDOL 755 MG/ML
500 INJECTION, SOLUTION INTRAVASCULAR ONCE
Status: COMPLETED | OUTPATIENT
Start: 2024-07-29 | End: 2024-07-29

## 2024-07-29 RX ADMIN — SODIUM CHLORIDE 70 ML: 9 INJECTION, SOLUTION INTRAVENOUS at 14:33

## 2024-07-29 RX ADMIN — IOPAMIDOL 90 ML: 755 INJECTION, SOLUTION INTRAVENOUS at 14:35

## 2024-08-04 NOTE — TELEPHONE ENCOUNTER
This was ordered for multiple symptoms and not just for HTN as his denial letter indicates.  Can we have someone resubmit this?

## 2024-08-05 NOTE — TELEPHONE ENCOUNTER
Will route message to [employer] Billing pool to look at the denial.     Billing team- please resubmit claim with additional diagnosis codes Dr. Jolley mentioned.     Faby Devlin RN on 8/5/2024 at 9:13 AM  Clinic Manager

## 2024-08-05 NOTE — TELEPHONE ENCOUNTER
Hello,    We reviewed the patients letter and see the [place] in question [place] 97[address]. We reviewed all claims currently outstanding on the patients account and there are no services outstanding or pending with the insurance for that code. We see a prior authorization request was placed and denied for this code but we do not see any services for that code actually taking place on 07/15/2024 or 07/29/2024. If this is a prior authorization denial, please work with the financial securing team directly. The patients current balance due is being left for coinsurance and is not for denied services.    If the patient has further billing concerns, please have them reach out to billing customer service directly by going to    Menu  Messages  Ask a Question  Ask a Customer Service Question  Billing question    Thank you,    [facility] Patient Financial Services Team.

## 2024-09-30 DIAGNOSIS — I10 ESSENTIAL HYPERTENSION, BENIGN: ICD-10-CM

## 2024-10-02 RX ORDER — LISINOPRIL 20 MG/1
20 TABLET ORAL DAILY
Qty: 90 TABLET | Refills: 2 | Status: SHIPPED | OUTPATIENT
Start: 2024-10-02

## 2024-11-03 ENCOUNTER — HEALTH MAINTENANCE LETTER (OUTPATIENT)
Age: 59
End: 2024-11-03

## 2024-12-18 ENCOUNTER — NURSE TRIAGE (OUTPATIENT)
Dept: FAMILY MEDICINE | Facility: OTHER | Age: 59
End: 2024-12-18
Payer: COMMERCIAL

## 2024-12-18 NOTE — TELEPHONE ENCOUNTER
"Nurse Triage SBAR    Is this a 2nd Level Triage? NO    Situation: Patient calling and states he has been dealing with cough and chest congestion for about a month now. First couple weeks thought it was a cold. Dry persistent cough. Has been using cough syrup and this has not helped. Now cough is occasionally productive. Patient states phlegm feels thick but he swallows it so is unsure of color. Patient states throat feels itchy \"way in the back\". Denies difficulty breathing, denies CP, denies fevers. Does state his muscles are sore from coughing. Patient also wondering if he was exposed to mold recently as he does have an allergy to this. No known sick contacts. Has not tested for covid.     Background:     Assessment: A/O.    Protocol Recommended Disposition:   See in Office Within 3 Days    Recommendation: see in office within 3 days, patient agrees with plan. Appointment made. Patient states he does have an  covid test at home and is wondering if he can take this. Writer advised he can take this test and update us with result but to keep appointment for tomorrow. Reviewed red flag symptoms. Patient verbalizes understanding and agrees with plan.    Pamella Junior RN on 2024 at 4:30 PM      Reason for Disposition   Cough has been present for > 3 weeks    Additional Information   Negative: Bluish (or gray) lips or face   Negative: SEVERE difficulty breathing (e.g., struggling for each breath, speaks in single words)   Negative: Rapid onset of cough and has hives   Negative: Coughing started suddenly after medicine, an allergic food or bee sting   Negative: Difficulty breathing after exposure to flames, smoke, or fumes   Negative: Sounds like a life-threatening emergency to the triager   Negative: Previous asthma attacks and this feels like asthma attack   Negative: Dry cough (non-productive; no sputum or minimal clear sputum) and within 14 days of COVID-19 Exposure   Negative: MODERATE difficulty " "breathing (e.g., speaks in phrases, SOB even at rest, pulse 100-120) and still present when not coughing   Negative: Chest pain present when not coughing   Negative: Passed out (e.g., fainted, lost consciousness, blacked out and was not responding)   Negative: Patient sounds very sick or weak to the triager   Negative: MILD difficulty breathing (e.g., minimal/no SOB at rest, SOB with walking, pulse <100) and still present when not coughing   Negative: Coughed up > 1 tablespoon (15 ml) blood (Exception: Blood-tinged sputum.)   Negative: Fever > 103 F (39.4 C)   Negative: Fever > 101 F (38.3 C) and over 60 years of age   Negative: Fever > 100 F (37.8 C) and has diabetes mellitus or a weak immune system (e.g., HIV positive, cancer chemotherapy, organ transplant, splenectomy, chronic steroids)   Negative: Fever > 100 F (37.8 C) and bedridden (e.g., CVA, chronic illness, recovering from surgery)   Negative: Increasing ankle swelling   Negative: Wheezing is present   Negative: SEVERE coughing spells (e.g., whooping sound after coughing, vomiting after coughing)   Negative: Coughing up tanner-colored (reddish-brown) or blood-tinged sputum   Negative: Fever present > 3 days (72 hours)   Negative: Fever returns after gone for over 24 hours and symptoms worse or not improved   Negative: Using nasal washes and pain medicine > 24 hours and sinus pain persists   Negative: Known COPD or other severe lung disease (i.e., bronchiectasis, cystic fibrosis, lung surgery) and symptoms getting worse (i.e., increased sputum purulence or amount, increased breathing difficulty)   Negative: Continuous (nonstop) coughing interferes with work or school and no improvement using cough treatment per Care Advice   Negative: Patient wants to be seen    Answer Assessment - Initial Assessment Questions  1. ONSET: \"When did the cough begin?\"       About one month ago  2. SEVERITY: \"How bad is the cough today?\"       Comes and goes, taking cold medicine " "and that help  3. SPUTUM: \"Describe the color of your sputum\" (e.g., none, dry cough; clear, white, yellow, green)      Dry cough for past couple weeks, past couple days thick sputum, swallows it so unsure of color  4. HEMOPTYSIS: \"Are you coughing up any blood?\" If Yes, ask: \"How much?\" (e.g., flecks, streaks, tablespoons, etc.)      no  5. DIFFICULTY BREATHING: \"Are you having difficulty breathing?\" If Yes, ask: \"How bad is it?\" (e.g., mild, moderate, severe)       no  6. FEVER: \"Do you have a fever?\" If Yes, ask: \"What is your temperature, how was it measured, and when did it start?\"      no  7. CARDIAC HISTORY: \"Do you have any history of heart disease?\" (e.g., heart attack, congestive heart failure)       no  8. LUNG HISTORY: \"Do you have any history of lung disease?\"  (e.g., pulmonary embolus, asthma, emphysema)      Many years ago asthma  9. PE RISK FACTORS: \"Do you have a history of blood clots?\" (or: recent major surgery, recent prolonged travel, bedridden)      no  10. OTHER SYMPTOMS: \"Do you have any other symptoms?\" (e.g., runny nose, wheezing, chest pain)        \"At some point all of them\". Mostly sore muscles from couging  11. PREGNANCY: \"Is there any chance you are pregnant?\" \"When was your last menstrual period?\"        NA  12. TRAVEL: \"Have you traveled out of the country in the last month?\" (e.g., travel history, exposures)        no    Protocols used: Cough-A-OH    "

## 2024-12-19 ENCOUNTER — OFFICE VISIT (OUTPATIENT)
Dept: FAMILY MEDICINE | Facility: OTHER | Age: 59
End: 2024-12-19
Payer: COMMERCIAL

## 2024-12-19 ENCOUNTER — ANCILLARY PROCEDURE (OUTPATIENT)
Dept: GENERAL RADIOLOGY | Facility: OTHER | Age: 59
End: 2024-12-19
Attending: PHYSICIAN ASSISTANT
Payer: COMMERCIAL

## 2024-12-19 VITALS
TEMPERATURE: 97.3 F | WEIGHT: 220 LBS | HEIGHT: 69 IN | RESPIRATION RATE: 18 BRPM | HEART RATE: 71 BPM | DIASTOLIC BLOOD PRESSURE: 86 MMHG | OXYGEN SATURATION: 98 % | BODY MASS INDEX: 32.58 KG/M2 | SYSTOLIC BLOOD PRESSURE: 128 MMHG

## 2024-12-19 DIAGNOSIS — R09.89 CHEST CONGESTION: ICD-10-CM

## 2024-12-19 DIAGNOSIS — J06.9 URI WITH COUGH AND CONGESTION: Primary | ICD-10-CM

## 2024-12-19 DIAGNOSIS — Z12.11 SCREEN FOR COLON CANCER: ICD-10-CM

## 2024-12-19 DIAGNOSIS — R73.02 GLUCOSE INTOLERANCE (IMPAIRED GLUCOSE TOLERANCE): ICD-10-CM

## 2024-12-19 DIAGNOSIS — J06.9 URI WITH COUGH AND CONGESTION: ICD-10-CM

## 2024-12-19 DIAGNOSIS — R05.8 DRY COUGH: ICD-10-CM

## 2024-12-19 DIAGNOSIS — R73.03 PREDIABETES: ICD-10-CM

## 2024-12-19 LAB
BASOPHILS # BLD AUTO: 0 10E3/UL (ref 0–0.2)
BASOPHILS NFR BLD AUTO: 1 %
CREAT UR-MCNC: 137.3 MG/DL
CRP SERPL-MCNC: <3 MG/L
EOSINOPHIL # BLD AUTO: 0.5 10E3/UL (ref 0–0.7)
EOSINOPHIL NFR BLD AUTO: 8 %
ERYTHROCYTE [DISTWIDTH] IN BLOOD BY AUTOMATED COUNT: 12.4 % (ref 10–15)
EST. AVERAGE GLUCOSE BLD GHB EST-MCNC: 128 MG/DL
HBA1C MFR BLD: 6.1 % (ref 0–5.6)
HCT VFR BLD AUTO: 46.5 % (ref 40–53)
HGB BLD-MCNC: 15.8 G/DL (ref 13.3–17.7)
IMM GRANULOCYTES # BLD: 0 10E3/UL
IMM GRANULOCYTES NFR BLD: 0 %
LYMPHOCYTES # BLD AUTO: 1.7 10E3/UL (ref 0.8–5.3)
LYMPHOCYTES NFR BLD AUTO: 25 %
MCH RBC QN AUTO: 29.5 PG (ref 26.5–33)
MCHC RBC AUTO-ENTMCNC: 34 G/DL (ref 31.5–36.5)
MCV RBC AUTO: 87 FL (ref 78–100)
MICROALBUMIN UR-MCNC: <12 MG/L
MICROALBUMIN/CREAT UR: NORMAL MG/G{CREAT}
MONOCYTES # BLD AUTO: 0.7 10E3/UL (ref 0–1.3)
MONOCYTES NFR BLD AUTO: 10 %
NEUTROPHILS # BLD AUTO: 3.9 10E3/UL (ref 1.6–8.3)
NEUTROPHILS NFR BLD AUTO: 57 %
PLATELET # BLD AUTO: 201 10E3/UL (ref 150–450)
RBC # BLD AUTO: 5.35 10E6/UL (ref 4.4–5.9)
WBC # BLD AUTO: 6.8 10E3/UL (ref 4–11)

## 2024-12-19 RX ORDER — METHYLPREDNISOLONE 4 MG/1
TABLET ORAL
Qty: 21 TABLET | Refills: 0 | Status: SHIPPED | OUTPATIENT
Start: 2024-12-19

## 2024-12-19 ASSESSMENT — ENCOUNTER SYMPTOMS: COUGH: 1

## 2024-12-19 NOTE — PROGRESS NOTES
"  Assessment & Plan     URI with cough and congestion  Chest congestion  Dry cough  Appears to be viral or allergic in nature.  Would have him take medications as noted below and follow-up with his primary care provider in the near future.  - Albumin Random Urine Quantitative with Creat Ratio; Future  - CBC with platelets and differential; Future  - XR Chest 2 Views; Future  - CRP, inflammation; Future  - Albumin Random Urine Quantitative with Creat Ratio  - CBC with platelets and differential  - CRP, inflammation  - methylPREDNISolone (MEDROL DOSEPAK) 4 MG tablet therapy pack; Follow Package Directions    Glucose intolerance (impaired glucose tolerance)  Screen for colon cancer  Prediabetes  Labs pending at time of dictation.  Follow-up with his primary care provider strongly recommended.  - Albumin Random Urine Quantitative with Creat Ratio; Future  - CBC with platelets and differential; Future  - XR Chest 2 Views; Future  - CRP, inflammation; Future  - Albumin Random Urine Quantitative with Creat Ratio  - CBC with platelets and differential  - CRP, inflammation    BMI  Estimated body mass index is 32.25 kg/m  as calculated from the following:    Height as of this encounter: 1.759 m (5' 9.25\").    Weight as of this encounter: 99.8 kg (220 lb).   Weight management plan: Patient was referred to their PCP to discuss a diet and exercise plan.    Rafael Calvillo is a 59 year old, presenting for the following health issues:  Cough and Chest congestion      12/19/2024    11:15 AM   Additional Questions   Roomed by Natalia MEYER   Accompanied by wife     History of Present Illness       Reason for visit:  Sick  Symptom onset:  More than a month  Symptoms include:  Cough  Symptom intensity:  Severe  Symptom progression:  Worsening  Had these symptoms before:  Yes  Has tried/received treatment for these symptoms:  No  What makes it better:  NyQuil   He is taking medications regularly.       Acute Illness  Acute illness " "concerns: Cough and chest congestion  Onset/Duration: 1 month  Symptoms:  Fever: No  Chills/Sweats: YES- chills  Headache (location?): YES- back of the head and top of head as well and behind eyes  Sinus Pressure: YES  Conjunctivitis:  No  Ear Pain: no - clogged feeling  Rhinorrhea: YES  Congestion: YES- chest congestion  Sore Throat: YES  Cough: YES-non-productive and also productive of yellow sputum  Wheeze: No  Decreased Appetite: No  Nausea: No  Vomiting: when he was sick originally he threw up 1 day about 2 weeks ago, thinking it was food poisoning  Diarrhea: No  Dysuria/Freq.: No  Dysuria or Hematuria: No  Fatigue/Achiness: YES- always  Sick/Strep Exposure: No  Therapies tried and outcome: Nyquil, Mucinex (one dose yesterday morning). The Nyquil helps a little bit for his symptoms. He took an old COVID test they bought last year, it is  by 4 months but it was negative just FYI.        Review of Systems  Constitutional, HEENT, cardiovascular, pulmonary, GI, , musculoskeletal, neuro, skin, endocrine and psych systems are negative, except as otherwise noted.      Objective    /86   Pulse 71   Temp 97.3  F (36.3  C) (Temporal)   Resp 18   Ht 1.759 m (5' 9.25\")   Wt 99.8 kg (220 lb)   SpO2 98%   BMI 32.25 kg/m    Body mass index is 32.25 kg/m .  Physical Exam   GENERAL: alert and no distress  NECK: no adenopathy, no asymmetry, masses, or scars  RESP: expiratory wheezes R lower posterior otherwise clear to auscultation  CV: regular rate and rhythm, normal S1 S2, no S3 or S4, no murmur, click or rub, no peripheral edema  ABDOMEN: soft, nontender, no hepatosplenomegaly, no masses and bowel sounds normal  MS: no gross musculoskeletal defects noted, no edema    X-ray: negative for concerning pathology to my independent review today.  It will be overread by radiology.    Results for orders placed or performed in visit on 24 (from the past 24 hours)   CBC with platelets and differential    " Narrative    The following orders were created for panel order CBC with platelets and differential.  Procedure                               Abnormality         Status                     ---------                               -----------         ------                     CBC with platelets and d...[[id number]]                      Final result                 Please view results for these tests on the individual orders.   CBC with platelets and differential   Result Value Ref Range    WBC Count 6.8 4.0 - 11.0 10e3/uL    RBC Count 5.35 4.40 - 5.90 10e6/uL    Hemoglobin 15.8 13.3 - 17.7 g/dL    Hematocrit 46.5 40.0 - 53.0 %    MCV 87 78 - 100 fL    MCH 29.5 26.5 - 33.0 pg    MCHC 34.0 31.5 - 36.5 g/dL    RDW 12.4 10.0 - 15.0 %    Platelet Count 201 150 - 450 10e3/uL    % Neutrophils 57 %    % Lymphocytes 25 %    % Monocytes 10 %    % Eosinophils 8 %    % Basophils 1 %    % Immature Granulocytes 0 %    Absolute Neutrophils 3.9 1.6 - 8.3 10e3/uL    Absolute Lymphocytes 1.7 0.8 - 5.3 10e3/uL    Absolute Monocytes 0.7 0.0 - 1.3 10e3/uL    Absolute Eosinophils 0.5 0.0 - 0.7 10e3/uL    Absolute Basophils 0.0 0.0 - 0.2 10e3/uL    Absolute Immature Granulocytes 0.0 <=0.4 10e3/uL           Signed Electronically by: Ramana Gomez PA-C

## 2024-12-23 ENCOUNTER — TELEPHONE (OUTPATIENT)
Dept: GASTROENTEROLOGY | Facility: CLINIC | Age: 59
End: 2024-12-23
Payer: COMMERCIAL

## 2024-12-23 NOTE — TELEPHONE ENCOUNTER
"Endoscopy Scheduling Screen    Have you had any respiratory illness or flu-like symptoms in the last 10 days?  No    What is your communication preference for Instructions and/or Bowel Prep?   MyChart    What insurance is in the chart?  Other:  [facility]    Ordering/Referring Provider: MAG GALLOWAY   (If ordering provider performs procedure, schedule with ordering provider unless otherwise instructed. )    BMI: Estimated body mass index is 32.25 kg/m  as calculated from the following:    Height as of 12/19/24: 1.759 m (5' 9.25\").    Weight as of 12/19/24: 99.8 kg (220 lb).     Sedation Ordered  moderate sedation.   If patient BMI > 50 do not schedule in [facility].    If patient BMI > 45 do not schedule at [facility].    Are you taking methadone or Suboxone?  NO, No RN review required.    Have you been diagnosed and are being treated for severe PTSD or severe anxiety?  NO, No RN review required.    Are you taking any prescription medications for pain 3 or more times per week?   NO, No RN review required.    Do you have a history of malignant hyperthermia?  No    (Females) Are you currently pregnant?   No     Have you been diagnosed or told you have pulmonary hypertension?   No    Do you have an LVAD?  No    Have you been told you have moderate to severe sleep apnea?  Yes. Do you use a CPAP? Yes Where is the patient located?. (RN Review required for scheduling unless scheduling in Hospital.)     Have you been told you have COPD, asthma, or any other lung disease?  No    Do you have any heart conditions?  No     Have you ever had or are you waiting for an organ transplant?  No. Continue scheduling, no site restrictions.    Have you had a stroke or transient ischemic attack (TIA aka \"mini stroke\" in the last 6 months?   No    Have you been diagnosed with or been told you have cirrhosis of the liver?   No.    Are you currently on dialysis?   No    Do you need assistance transferring?   No    BMI: Estimated body mass index is 32.25 kg/m  " "as calculated from the following:    Height as of 12/19/24: 1.759 m (5' 9.25\").    Weight as of 12/19/24: 99.8 kg (220 lb).     Is patients BMI > 40 and scheduling location [facility]?  No    Do you take an injectable or oral medication for weight loss or diabetes (excluding insulin)?  No    Do you take the medication Naltrexone?  No    Do you take blood thinners?  No       Prep   Are you currently on dialysis or do you have chronic kidney disease?  No    Do you have a diagnosis of diabetes?  No    Do you have a diagnosis of cystic fibrosis (CF)?  No    On a regular basis do you go 3 -5 days between bowel movements?  No    BMI > 40?  No    Preferred Pharmacy:    [facility] [facility] [address] [address] [place] [id number] [facility] [address]  Phone: [phone] Fax: [phone]    Final Scheduling Details     Procedure scheduled  Colonoscopy    Surgeon:  BOBBY     Date of procedure:  2/10/25     Pre-OP / PAC:   No - Not required for this site.    Location  [facility] - Per order.    Sedation   MAC/Deep Sedation  Per location.      Patient Reminders:   You will receive a call from a Nurse to review instructions and health history.  This assessment must be completed prior to your procedure.  Failure to complete the Nurse assessment may result in the procedure being cancelled.      On the day of your procedure, please designate an adult(s) who can drive you home stay with you for the next 24 hours. The medicines used in the exam will make you sleepy. You will not be able to drive.      You cannot take public transportation, ride share services, or non-medical taxi service without a responsible caregiver.  Medical transport services are allowed with the requirement that a responsible caregiver will receive you at your destination.  We require that drivers and caregivers are confirmed prior to your procedure.    "

## 2025-01-04 ENCOUNTER — NURSE TRIAGE (OUTPATIENT)
Dept: NURSING | Facility: CLINIC | Age: 60
End: 2025-01-04
Payer: COMMERCIAL

## 2025-01-04 DIAGNOSIS — R73.03 PREDIABETES: ICD-10-CM

## 2025-01-04 DIAGNOSIS — M10.9 ACUTE GOUTY ARTHRITIS: Primary | ICD-10-CM

## 2025-01-04 RX ORDER — INDOMETHACIN 50 MG/1
50 CAPSULE ORAL 2 TIMES DAILY WITH MEALS
Qty: 6 CAPSULE | Refills: 0 | Status: SHIPPED | OUTPATIENT
Start: 2025-01-04 | End: 2025-01-09

## 2025-01-04 NOTE — TELEPHONE ENCOUNTER
Nurse Triage SBAR    Is this a 2nd Level Triage? YES, LICENSED PRACTITIONER REVIEW IS REQUIRED    Situation: Gout flare    Background: Patient calling, states that he is having a gout flare on his left great toe. States that it is red and painful. States that  he had had these before.  Denies any recent injury, denies any discharge, denies spreading redness.  He would like medication called in if possible.     Assessment: Gout flare left great toe.     Protocol Recommended Disposition:   See HCP Within 4 Hours (Or PCP Triage)    Recommendation:        Paged to provider    Does the patient meet one of the following criteria for ADS visit consideration? No      Provider consult indicated.     Reason for page: Gout flare - medication request    Page sent to Dr. Braun by Answering Service at [facility] directly with Dr. Braun. Per Dr. Braun: Verbal order for Indomethicin given.     Provider Recommendation Follow Up:   Reached patient/caregiver. Informed of provider's recommendations. Patient verbalized understanding and agrees with the plan.         MARIAH TRIVEDI RN       Reason for Disposition   [1] SEVERE pain (e.g., excruciating, unable to do any normal activities) AND [2] not improved after 2 hours of pain medicine    Additional Information   Negative: Followed a toe injury   Negative: Wound looks infected   Negative: Caused by an animal bite   Negative: Caused by frostbite   Negative: Caused by an ingrown toenail   Negative: Athlete's Foot suspected (i.e., itchy red rash in web space between toes)   Negative: Foot pain is main symptom   Negative: Foot is cool or blue in comparison to other foot   Negative: Purple or black skin on toe  (Exception: Person remembers bruising the toe from an injury.)   Negative: [1] Looks infected (spreading redness, red streak, pus) AND [2] fever   Negative: Patient sounds very sick or weak to the triager    Protocols used: Toe Pain-A-AH

## 2025-01-06 RX ORDER — METFORMIN HYDROCHLORIDE 500 MG/1
TABLET, EXTENDED RELEASE ORAL
Qty: 90 TABLET | Refills: 0 | Status: SHIPPED | OUTPATIENT
Start: 2025-01-06

## 2025-01-08 ENCOUNTER — MYC MEDICAL ADVICE (OUTPATIENT)
Dept: FAMILY MEDICINE | Facility: OTHER | Age: 60
End: 2025-01-08
Payer: COMMERCIAL

## 2025-01-08 NOTE — TELEPHONE ENCOUNTER
Rn did call and speak with patient. Being patient has had treatment and gout is not better, advised in person visit with provider. Patient verbalized understanding and is agreeable. Patient scheduled with provider tomorrow. Denies any other questions or concerns at this time.

## 2025-01-09 ENCOUNTER — OFFICE VISIT (OUTPATIENT)
Dept: FAMILY MEDICINE | Facility: OTHER | Age: 60
End: 2025-01-09
Payer: COMMERCIAL

## 2025-01-09 VITALS
RESPIRATION RATE: 18 BRPM | SYSTOLIC BLOOD PRESSURE: 118 MMHG | OXYGEN SATURATION: 98 % | BODY MASS INDEX: 33.18 KG/M2 | WEIGHT: 224 LBS | HEART RATE: 70 BPM | DIASTOLIC BLOOD PRESSURE: 72 MMHG | TEMPERATURE: 97.4 F | HEIGHT: 69 IN

## 2025-01-09 DIAGNOSIS — I10 ESSENTIAL HYPERTENSION, BENIGN: ICD-10-CM

## 2025-01-09 DIAGNOSIS — M10.9 GOUTY ARTHRITIS OF LEFT GREAT TOE: Primary | ICD-10-CM

## 2025-01-09 DIAGNOSIS — R73.03 PREDIABETES: ICD-10-CM

## 2025-01-09 RX ORDER — PREDNISONE 20 MG/1
40 TABLET ORAL DAILY
Qty: 10 TABLET | Refills: 0 | Status: SHIPPED | OUTPATIENT
Start: 2025-01-09 | End: 2025-01-14

## 2025-01-09 ASSESSMENT — PAIN SCALES - GENERAL: PAINLEVEL_OUTOF10: MILD PAIN (2)

## 2025-01-09 NOTE — LETTER
January 9, 2025      Rajinder Siegel  [address] [facility] [address]        To Whom It May Concern:    Rajinder Siegel  was seen on 1/9/25.  Please excuse his absence on 1/9/24 due to illness.        Sincerely,          CEE Blue CNP

## 2025-01-09 NOTE — PROGRESS NOTES
Assessment & Plan     Gouty arthritis of left great toe  Patient is a 60 year-old male with hypertension and prediabetes presenting with concerns of ongoing acute gout flare. Was prescribed a 3 day course of BID indomethacin for flare on 1/4/25. Continues to have signs of acute flare with erythema, swelling, and pain to lateral left hallux. Prescribed 5 day course of prednisone. Education provided on following low purine diet to prevent flares. Consider adding allopurinol if gout flares increase in frequency. Discussed proper use of medication(s) and potential side effects. Follow-up if symptoms fail to improve despite above interventions. Patient understands and is agreeable to plan as discussed in clinic.  - predniSONE (DELTASONE) 20 MG tablet; Take 2 tablets (40 mg) by mouth daily for 5 days.  - diclofenac (VOLTAREN) 1 % topical gel; Apply 2 g topically 4 times daily.    Essential hypertension, benign  Stable. Continue with prescribed medication regimen. Would recommend avoiding initiating diuretic therapy given gout history.     Prediabetes  On metformin.           Subjective   Rajinder is a 60 year old, presenting for the following health issues:  Arthritis        1/9/2025     1:24 PM   Additional Questions   Roomed by Conchis ALCARAZ   Accompanied by Wife     History of Present Illness       Reason for visit:  Gout   He is taking medications regularly.    Gout/ Single Inflamed Joint  Onset/Duration: Last Friday   Description:   Location: big toe and foot - left  Joint Swelling: YES  Redness: YES  Pain: YES  Intensity: mild  Progression of Symptoms: improving  Accompanying Signs & Symptoms:  Fevers: No  History:   Trauma to the area: No  Previous history of gout: YES  Recent illness: YES Sick for about 2 weeks, 3 weeks ago   Alcohol use: No  Diuretic use: No    Presents with concerns of acute gout flare of left great toe. Swelling, redness, and pain started on Friday 1/3/25. Was prescribed 3 day course of twice daily  "indomethacin. No improvement while taking but today redness and pain have improved. First gout flare was in January 2023 and has one other flare since then.          Objective    /72   Pulse 70   Temp 97.4  F (36.3  C) (Temporal)   Resp 18   Ht 1.759 m (5' 9.25\")   Wt 101.6 kg (224 lb)   SpO2 98%   BMI 32.84 kg/m    Body mass index is 32.84 kg/m .  Physical Exam  Vitals reviewed.   Constitutional:       General: He is not in acute distress.     Appearance: Normal appearance. He is not ill-appearing.   Cardiovascular:      Rate and Rhythm: Normal rate and regular rhythm.      Pulses:           Dorsalis pedis pulses are 2+ on the left side.        Posterior tibial pulses are 2+ on the left side.      Heart sounds: Normal heart sounds, S1 normal and S2 normal. No murmur heard.  Pulmonary:      Effort: Pulmonary effort is normal. No respiratory distress.      Breath sounds: Normal breath sounds.   Musculoskeletal:      Left foot: Swelling (Swelling, warmth, and erythema to lateal aspect of left hallux consistent with gout.) present.   Skin:     General: Skin is warm and dry.      Capillary Refill: Capillary refill takes less than 2 seconds.      Findings: No lesion or rash.   Neurological:      Mental Status: He is alert.   Psychiatric:         Attention and Perception: Attention and perception normal.         Mood and Affect: Mood and affect normal.          Signed Electronically by: CEE Blue CNP    "

## 2025-01-09 NOTE — PATIENT INSTRUCTIONS
Given the lingering pain, redness, and swelling in your left great toe joint- I have prescribed a 5 day course of prednisone to further reduce inflammation and swelling in joint. Take in the morning with food to prevent upset stomach and sleep disturbances. I have also prescribed a topical anti-inflammatory medication called diclofenac (Voltaren) gel. You can apply up to 4 times per day. Continue elevating foot, applying, ice, and resting.     To reduce flares, we recommend following a low purine diet. See attached for more information.

## 2025-01-22 ENCOUNTER — OFFICE VISIT (OUTPATIENT)
Dept: FAMILY MEDICINE | Facility: OTHER | Age: 60
End: 2025-01-22
Payer: COMMERCIAL

## 2025-01-22 VITALS
OXYGEN SATURATION: 96 % | SYSTOLIC BLOOD PRESSURE: 132 MMHG | WEIGHT: 222 LBS | DIASTOLIC BLOOD PRESSURE: 82 MMHG | TEMPERATURE: 97.5 F | BODY MASS INDEX: 32.55 KG/M2 | RESPIRATION RATE: 16 BRPM | HEART RATE: 82 BPM

## 2025-01-22 DIAGNOSIS — M10.9 GOUTY ARTHRITIS OF LEFT GREAT TOE: ICD-10-CM

## 2025-01-22 DIAGNOSIS — M62.838 MUSCLE SPASM: ICD-10-CM

## 2025-01-22 DIAGNOSIS — R25.2 BILATERAL LEG CRAMPS: ICD-10-CM

## 2025-01-22 DIAGNOSIS — R73.03 PREDIABETES: Primary | ICD-10-CM

## 2025-01-22 DIAGNOSIS — E13.42 DIABETIC POLYNEUROPATHY ASSOCIATED WITH OTHER SPECIFIED DIABETES MELLITUS (H): ICD-10-CM

## 2025-01-22 DIAGNOSIS — R20.0 NUMBNESS: ICD-10-CM

## 2025-01-22 PROCEDURE — 99215 OFFICE O/P EST HI 40 MIN: CPT | Performed by: FAMILY MEDICINE

## 2025-01-22 PROCEDURE — 99417 PROLNG OP E/M EACH 15 MIN: CPT | Performed by: FAMILY MEDICINE

## 2025-01-22 PROCEDURE — G2211 COMPLEX E/M VISIT ADD ON: HCPCS | Performed by: FAMILY MEDICINE

## 2025-01-22 ASSESSMENT — PAIN SCALES - GENERAL: PAINLEVEL_OUTOF10: MODERATE PAIN (4)

## 2025-01-22 NOTE — PROGRESS NOTES
Assessment & Plan       ICD-10-CM    1. Prediabetes  R73.03 EMG      2. Diabetic polyneuropathy associated with other specified diabetes mellitus (H)  E13.42 EMG      3. Bilateral leg cramps  R25.2 EMG      4. Gouty arthritis of left great toe  M10.9       5. Numbness  R20.0       6. Muscle spasm  M62.838            1, 2, 3, 5, 6.  His symptoms certainly seem to have a component of neuropathy and this could certainly be present due to his prediabetes.  Other workup for neuropathy has been negative.  Blood sugars are well-controlled, so I would expect this to not worsen.  Discussed a trial of L-arginine to see if that is helpful.  Patient would like to try that.  It is also possible that he has some restless legs or nocturnal leg cramps contributing to this.  Discussed a trial of gabapentin, but patient declines.  Cannot rule out the possibility of spinal issue contributing to his symptoms.  Offered neurology consultation for EMG.  Patient wished to proceed with EMG testing.  This was ordered.  Could also consider a muscle relaxant if desired.  Follow-up as needed.  Continue with current dosing of metformin for prediabetes and continued efforts at lifestyle modifications.  4.  Clinically stable.  Has had at least 3 flares.  Discussed prevention with low purine diet and/or allopurinol.  Patient did not want to start additional medication at this time, but will look into trying a low purine diet.  Information was given.  Follow-up at future visit.    Patient declined immunizations today.  Discussed that he can get these done in the future.        Portions of this note were completed using Dragon dictation software.  Although reviewed, there may be typographical and other inadvertent errors that remain.             57 minutes spent by me on the date of the encounter doing chart review, history and exam, documentation and further activities per the note          Patient Instructions   Try L-arginine to see if that  helps with your symptoms.      Could try iron supplement as well if desired.      Can also consider a trial off rosuvastatin for up to one month, but if not clearly solidly improved, you should resume this.  If much better, we should consider a different statin to reduce your cardiovascular risk.      If not responding, we can consider a trial of gabapentin, duloxetine, etc.      Let's also get an EMG study.  We can consider some imaging of your spine if needed.      Consider taking allopurinol to prevent gout flares.  At least try to reduce purines in your diet.    Consider updating your immunizations.    Contact us or return if questions or concerns.    Have a nice day!    Dr. Jolley    Rafael Calvillo is a 60 year old, presenting for the following health issues:  Musculoskeletal Problem        1/22/2025     2:58 PM   Additional Questions   Roomed by Alisha DIA   Accompanied by Wife         1/22/2025     2:58 PM   Patient Reported Additional Medications   Patient reports taking the following new medications N/A     History of Present Illness       Reason for visit:  Gout   He is taking medications regularly.       Pt has had some numbness in his lower legs, from the knee down.  Getting worse over time.    It's worse after he's been kneeling or squatting.      He will get some cramping at night after going to sleep.  He also has a sensation of brackets attached to his lower legs if he does sleep well.      Magnesium doesn't seem to help very much.  Gets fewer charley horses, but other symptoms are worsening.      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      He sleeps a lot of the time when he's not working, every when off for several days.    Did have an episode where he had a lot of pain and thought his legs were going to give out when he got up to move.      He wonders if it might be time for a different job.      Stress  test was OK back in July.        Gout/ Single Inflamed Joint  Onset/Duration: Roughly 3 weeks  Description:   Location: big toe and foot - left  Joint Swelling: YES  Redness: YES  Pain: YES  Intensity: mild  Progression of Symptoms: worsening and waxing and waning  Accompanying Signs & Symptoms:  Fevers: No  History:   Trauma to the area: No  Previous history of gout: YES  Recent illness: YES- URI  Alcohol use: No  Diuretic use: No  Precipitating or alleviating factors: Positioning, work makes it worse.  Therapies tried and outcome: none and Positioning    Just had a gout flare a little bit ago.  Prednisone really helped with this.  He's now had 3 gout flares.        Patient states that he's also having tingling/pain radiating everywhere from shoulders to leg. 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 States it comes and goes in severity but is always present.      The 10-year ASCVD risk score (Sigrid MCKEON, et al., 2019) is: 13.4%    Values used to calculate the score:      Age: 60 years      Sex: Male      Is Non- : No      Diabetic: No      Tobacco smoker: No      Systolic Blood Pressure: 132 mmHg      Is BP treated: Yes      HDL Cholesterol: 36 mg/dL      Total Cholesterol: 186 mg/dL           Objective    /82 (Cuff Size: Adult Regular)   Pulse 82   Temp 97.5  F (36.4  C) (Temporal)   Resp 16   Wt 100.7 kg (222 lb)   SpO2 96%   BMI 32.55 kg/m    Body mass index is 32.55 kg/m .  Physical Exam   GENERAL: alert and no distress  NECK: no adenopathy, no asymmetry, masses, or scars  RESP: lungs clear to auscultation - no rales, rhonchi or wheezes  CV: regular rate and rhythm, normal S1 S2, no S3 or S4, no murmur, click or rub, no peripheral edema  ABDOMEN: soft, nontender, no hepatosplenomegaly, no masses and bowel sounds normal  MS: mild back pain, not particularly tender.  Some tingling on left back.  Some pain down right  leg with standing up straight.  Slightly decreased sensation in distal feet.    Diabetic foot exam: normal DP and PT pulses, no trophic changes or ulcerative lesions, and reduced sensation at distal LE.       Office Visit on 12/19/2024   Component Date Value Ref Range Status    Estimated Average Glucose 12/19/2024 128 (H)  <117 mg/dL Final    Hemoglobin A1C 12/19/2024 6.1 (H)  0.0 - 5.6 % Final    Normal <5.7%   Prediabetes 5.7-6.4%    Diabetes 6.5% or higher     Note: Adopted from ADA consensus guidelines.    Creatinine Urine mg/dL 12/19/2024 137.3  mg/dL Final    The reference ranges have not been established in urine creatinine. The results should be integrated into the clinical context for interpretation.    Albumin Urine mg/L 12/19/2024 <12.0  mg/L Final    The reference ranges have not been established in urine albumin. The results should be integrated into the clinical context for interpretation.    Albumin Urine mg/g Cr 12/19/2024    Final    Unable to calculate, urine albumin and/or urine creatinine is outside detectable limits.  Microalbuminuria is defined as an albumin:creatinine ratio of 17 to 299 for males and 25 to 299 for females. A ratio of albumin:creatinine of 300 or higher is indicative of overt proteinuria.  Due to biologic variability, positive results should be confirmed by a second, first-morning random or 24-hour timed urine specimen. If there is discrepancy, a third specimen is recommended. When 2 out of 3 results are in the microalbuminuria range, this is evidence for incipient nephropathy and warrants increased efforts at glucose control, blood pressure control, and institution of therapy with an angiotensin-converting-enzyme (ACE) inhibitor (if the patient can tolerate it).      CRP Inflammation 12/19/2024 <3.00  <5.00 mg/L Final    WBC Count 12/19/2024 6.8  4.0 - 11.0 10e3/uL Final    RBC Count 12/19/2024 5.35  4.40 - 5.90 10e6/uL Final    Hemoglobin 12/19/2024 15.8  13.3 - 17.7 g/dL  Final    Hematocrit 12/19/2024 46.5  40.0 - 53.0 % Final    MCV 12/19/2024 87  78 - 100 fL Final    MCH 12/19/2024 29.5  26.5 - 33.0 pg Final    MCHC 12/19/2024 34.0  31.5 - 36.5 g/dL Final    RDW 12/19/2024 12.4  10.0 - 15.0 % Final    Platelet Count 12/19/2024 201  150 - 450 10e3/uL Final    % Neutrophils 12/19/2024 57  % Final    % Lymphocytes 12/19/2024 25  % Final    % Monocytes 12/19/2024 10  % Final    % Eosinophils 12/19/2024 8  % Final    % Basophils 12/19/2024 1  % Final    % Immature Granulocytes 12/19/2024 0  % Final    Absolute Neutrophils 12/19/2024 3.9  1.6 - 8.3 10e3/uL Final    Absolute Lymphocytes 12/19/2024 1.7  0.8 - 5.3 10e3/uL Final    Absolute Monocytes 12/19/2024 0.7  0.0 - 1.3 10e3/uL Final    Absolute Eosinophils 12/19/2024 0.5  0.0 - 0.7 10e3/uL Final    Absolute Basophils 12/19/2024 0.0  0.0 - 0.2 10e3/uL Final    Absolute Immature Granulocytes 12/19/2024 0.0  <=0.4 10e3/uL Final     No results found for any visits on 01/22/25.  No results found for this or any previous visit (from the past 24 hours).        Signed Electronically by: Marco Jolley MD, MD

## 2025-01-22 NOTE — PATIENT INSTRUCTIONS
Try L-arginine to see if that helps with your symptoms.      Could try iron supplement as well if desired.      Can also consider a trial off rosuvastatin for up to one month, but if not clearly solidly improved, you should resume this.  If much better, we should consider a different statin to reduce your cardiovascular risk.      If not responding, we can consider a trial of gabapentin, duloxetine, etc.      Let's also get an EMG study.  We can consider some imaging of your spine if needed.      Consider taking allopurinol to prevent gout flares.  At least try to reduce purines in your diet.    Consider updating your immunizations.    Contact us or return if questions or concerns.    Have a nice day!    Dr. Jolley

## 2025-01-23 ENCOUNTER — TELEPHONE (OUTPATIENT)
Dept: FAMILY MEDICINE | Facility: OTHER | Age: 60
End: 2025-01-23
Payer: COMMERCIAL

## 2025-01-23 NOTE — TELEPHONE ENCOUNTER
Order/Referral Request    Who is requesting: pcp    Orders being requested: EMG    Reason service is needed/diagnosis: tingling legs/numbness    When are orders needed by: asap    Has this been discussed with Provider: Yes    Does patient have a preference on a Group/Provider/Facility[facility]    Does patient have an appointment scheduled?: No    Where to send orders: Fax ([phone])    Could we send this information to you in Tappx or would you prefer to receive a phone call?:   Patient would like to be contacted via Tappx

## 2025-01-24 ENCOUNTER — TELEPHONE (OUTPATIENT)
Dept: GASTROENTEROLOGY | Facility: CLINIC | Age: 60
End: 2025-01-24
Payer: COMMERCIAL

## 2025-01-24 DIAGNOSIS — Z12.11 SPECIAL SCREENING FOR MALIGNANT NEOPLASMS, COLON: Primary | ICD-10-CM

## 2025-01-24 RX ORDER — BISACODYL 5 MG/1
TABLET, DELAYED RELEASE ORAL
Qty: 4 TABLET | Refills: 0 | Status: SHIPPED | OUTPATIENT
Start: 2025-01-24

## 2025-01-24 NOTE — TELEPHONE ENCOUNTER
Pre visit planning completed.      Procedure details:    Patient scheduled for Colonoscopy on 2/10/25.     Arrival time: 1430. Procedure time 1530    Facility location: [facility]; [address] , [place] [address]. Check in location: Main entrance at Surgery registation desk.    Sedation type: MAC    Pre op exam needed? No.    Indication for procedure: Screening      Chart review:     Electronic implanted devices? No    Recent diagnosis of diverticulitis within the last 6 weeks? No      Medication review:    Diabetic? Yes. Oral diabetic medications: Metformin (glucophage): HOLD day of procedure.    Anticoagulants? No    Weight loss medication/injectable? No GLP-1 medication per patient's medication list. Nursing to verify with pre-assessment call.    Other medication HOLDING recommendations:  N/A      Prep for procedure:     Bowel prep recommendation: Standard Golytely. Bowel prep sent to    [facility] [address] [place], [place] - [address] [facility]    Due to: diabetes    Procedure information and instructions sent via cash Beltrán LPN  Endoscopy Procedure Pre Assessment   [phone] option 2

## 2025-01-28 ENCOUNTER — MYC MEDICAL ADVICE (OUTPATIENT)
Dept: FAMILY MEDICINE | Facility: OTHER | Age: 60
End: 2025-01-28
Payer: COMMERCIAL

## 2025-01-28 NOTE — LETTER
February 4, 2025      Rajinder Siegel  [address] [address] [address]        To Whom It May Concern:    Rajinder Siegel  was seen on 1/22/25.  Due to worsening physical health, he would benefit from approximately a one-month leave of absence to recuperate from his demanding job.        Sincerely,        Marco Jolley MD, MD    Electronically signed

## 2025-01-28 NOTE — TELEPHONE ENCOUNTER
Pre assessment completed for upcoming procedure.   (Please see previous telephone encounter notes for complete details)      Procedure details:    Arrival time and facility location reviewed.    Pre op exam needed? No.    Designated  policy reviewed. Instructed to have someone stay 24  hours post procedure.       Medication review:    Medications reviewed. Please see supporting documentation below. Holding recommendations discussed (if applicable).   Oral diabetic medication(s): Metformin (glucophage): HOLD day of procedure.      Prep for procedure:     Procedure prep instructions reviewed.        Any additional information needed:  N/A      Patient verbalized understanding and had no questions or concerns at this time.      Jennifer Beltrán LPN  Endoscopy Procedure Pre Assessment   [phone] option 2

## 2025-02-04 ENCOUNTER — TELEPHONE (OUTPATIENT)
Dept: GASTROENTEROLOGY | Facility: CLINIC | Age: 60
End: 2025-02-04
Payer: COMMERCIAL

## 2025-02-04 NOTE — TELEPHONE ENCOUNTER
Caller: Rajinder    Reason for Reschedule/Cancellation (please be detailed, any staff messages or encounters to note?):   Work conflict    Did you cancel or rescheduled an EUS procedure? No.    Is screening questionnaire older than 3 months from the reschedule date.   If Yes, please complete screening questionnaire. No 12/23/25    Prior to reschedule please review:  Ordering Provider: MAG CONNELL   Sedation Determined: MAC per location  Does patient have any ASC Exclusions, please identify?: y, RACHEAL    Notes on Cancelled Procedure:  Procedure: Lower Endoscopy [Colonoscopy]   Date: 2/10/25  Location: [facility]; [address] [place] [address]  Surgeon: BOBBY    Rescheduled: Yes,   Procedure: Lower Endoscopy [Colonoscopy]    Date: 3/10/25   Location: [facility]; [address] [place] [address]   Surgeon: BOBBY   Sedation Level Scheduled  MAC ,  Reason for Sedation Level Per location   Instructions updated and sent: cash osei     Does patient need PAC or Pre -Op Rescheduled? : no

## 2025-02-06 ENCOUNTER — TELEPHONE (OUTPATIENT)
Dept: FAMILY MEDICINE | Facility: OTHER | Age: 60
End: 2025-02-06
Payer: COMMERCIAL

## 2025-02-06 NOTE — TELEPHONE ENCOUNTER
INCOMING FORMS    Sender: prudential     Type of Form, letter or note (What is requested?): fmla    How was the form received?: Fax    How should forms be returned?:  Fax : [phone]    Form placed in TC hold  bin for review/signature if appropriate.       In hold bin until appt date

## 2025-02-10 ENCOUNTER — TRANSFERRED RECORDS (OUTPATIENT)
Dept: HEALTH INFORMATION MANAGEMENT | Facility: CLINIC | Age: 60
End: 2025-02-10

## 2025-02-10 ENCOUNTER — VIRTUAL VISIT (OUTPATIENT)
Dept: FAMILY MEDICINE | Facility: OTHER | Age: 60
End: 2025-02-10
Payer: COMMERCIAL

## 2025-02-10 DIAGNOSIS — E13.42 DIABETIC POLYNEUROPATHY ASSOCIATED WITH OTHER SPECIFIED DIABETES MELLITUS (H): ICD-10-CM

## 2025-02-10 DIAGNOSIS — R20.0 NUMBNESS: ICD-10-CM

## 2025-02-10 DIAGNOSIS — M54.2 CERVICALGIA: ICD-10-CM

## 2025-02-10 DIAGNOSIS — R25.2 BILATERAL LEG CRAMPS: ICD-10-CM

## 2025-02-10 DIAGNOSIS — Z77.018 EXPOSURE TO HEAVY METALS: ICD-10-CM

## 2025-02-10 DIAGNOSIS — M62.838 MUSCLE SPASM: Primary | ICD-10-CM

## 2025-02-10 DIAGNOSIS — G89.29 CHRONIC MIDLINE LOW BACK PAIN WITH RIGHT-SIDED SCIATICA: ICD-10-CM

## 2025-02-10 DIAGNOSIS — M54.41 CHRONIC MIDLINE LOW BACK PAIN WITH RIGHT-SIDED SCIATICA: ICD-10-CM

## 2025-02-10 PROCEDURE — 98014 SYNCH AUDIO-ONLY EST MOD 30: CPT | Performed by: FAMILY MEDICINE

## 2025-02-10 NOTE — PATIENT INSTRUCTIONS
Hopefully, we will get additional information from the EMG test.  If you have not heard from me by the beginning of next week, please reach out to me.    The patient has a pulse, we may want to proceed with additional imaging, repeat physical therapy, or referral to spine specialist.  We could also consider other medications.    Okay to use Tylenol or ibuprofen as needed for pain control.  Let us know if any problems.    Contact us or return if questions or concerns.    Have a nice day!    Dr. Jolley

## 2025-02-10 NOTE — TELEPHONE ENCOUNTER
Yes.  They're in my box.  I had wondered why there wasn't a forms encounter for  them,  but now there is.

## 2025-02-10 NOTE — TELEPHONE ENCOUNTER
Forms completed.  At the , because patient did not sign his release.  He will come in to sign this today.  Okay to complete forms at that time.

## 2025-02-10 NOTE — PROGRESS NOTES
Rajinder is a 60 year old who is being evaluated via a billable telephone visit.    What phone number would you like to be contacted at? [phone]  How would you like to obtain your AVS? [employer]  Originating Location (pt. Location): Home    Distant Location (provider location):  On-site  Telephone visit completed due to the patient did not consent to a video visit.    Assessment & Plan       ICD-10-CM    1. Muscle spasm  M62.838       2. Numbness  R20.0 Heavy Metals Urine with Reflex to Arsenic Fractionated Urine     Blood metal panel      3. Cervicalgia  M54.2       4. Chronic midline low back pain with right-sided sciatica  M54.41     G89.29       5. Diabetic polyneuropathy associated with other specified diabetes mellitus (H)  E13.42       6. Bilateral leg cramps  R25.2       7. Exposure to heavy metals  Z77.018 Heavy Metals Urine with Reflex to Arsenic Fractionated Urine     Blood metal panel           Patient is not making much improvement, but does report significant improvement of his symptoms at rest.  His job has become very difficult to do with his current numbness and pain.  Spend the entire visit reviewing his past workup and filling out [facility] paperwork.  Will try 1 month off of work to see if this allows him to adequately recuperate.  In the meantime, will await EMG testing.  Based upon results of this, may proceed with additional imaging or referral to spine.  I also offered referral to physical therapy, but patient declined at this time.  Lastly, patient reports that he may have some heavy metal exposure at work and would like to do some testing for heavy metal exposure as this could be contributing to his symptoms.  Otherwise, no toxic causes of his suspected neuropathy have been found.      Portions of this note were completed using Dragon dictation software.  Although reviewed, there may be typographical and other inadvertent errors that remain.                     Patient Instructions   Hopefully,  "we will get additional information from the EMG test.  If you have not heard from me by the beginning of next week, please reach out to me.    The patient has a pulse, we may want to proceed with additional imaging, repeat physical therapy, or referral to spine specialist.  We could also consider other medications.    Okay to use Tylenol or ibuprofen as needed for pain control.  Let us know if any problems.    Contact us or return if questions or concerns.    Have a nice day!    Dr. Shola Hall   Rajinder is a 60 year old, presenting for the following health issues:  No chief complaint on file.        1/22/2025     2:58 PM   Additional Questions   Roomed by Alisha DIA   Accompanied by Wife     History of Present Illness       Reason for visit:  Leg nerve issues    He eats 2-3 servings of fruits and vegetables daily.He consumes 0 sweetened beverage(s) daily.He exercises with enough effort to increase his heart rate 9 or less minutes per day.  He exercises with enough effort to increase his heart rate 3 or less days per week.   He is taking medications regularly.       His job started to get harder a few years ago.  Methocarbamol was prescribed in 9/2023 to help  with this.  Ufnortuantely, has had further progression of his symptoms over the past 1.5 years.    He had EMG done today at [facility].  Results should be available in later this week.      He has been seeing chiropractic.  He has autofused 2 vertebrae in his lumbar and 2 in his cervical spine.      He hasn't done PT recently.  It helped a little when he did PT in 2023.      He has started the L-arginine, but hasn't noticed clear benefit from that.     Does get episodic numbness when he first stands up, low back feels strange,  but then it lets up after some time.      He wonders if this might be related to heavy metals.  He is exposed to some sort of \"alloy\" at work frequently.  It used to be lead, but is something else.  "                 Objective    Vitals - Patient Reported  Weight (Patient Reported): 99.8 kg (220 lb)  Pain Score: Moderate Pain (6)  Pain Loc: Upper Leg      Vitals:  No vitals were obtained today due to virtual visit.    Physical Exam   General: Alert and no distress //Respiratory: No audible wheeze, cough, or shortness of breath // Psychiatric:  Appropriate affect, tone, and pace of words      Office Visit on 12/19/2024   Component Date Value Ref Range Status    Estimated Average Glucose 12/19/2024 128 (H)  <117 mg/dL Final    Hemoglobin A1C 12/19/2024 6.1 (H)  0.0 - 5.6 % Final    Normal <5.7%   Prediabetes 5.7-6.4%    Diabetes 6.5% or higher     Note: Adopted from ADA consensus guidelines.    Creatinine Urine mg/dL 12/19/2024 137.3  mg/dL Final    The reference ranges have not been established in urine creatinine. The results should be integrated into the clinical context for interpretation.    Albumin Urine mg/L 12/19/2024 <12.0  mg/L Final    The reference ranges have not been established in urine albumin. The results should be integrated into the clinical context for interpretation.    Albumin Urine mg/g Cr 12/19/2024    Final    Unable to calculate, urine albumin and/or urine creatinine is outside detectable limits.  Microalbuminuria is defined as an albumin:creatinine ratio of 17 to 299 for males and 25 to 299 for females. A ratio of albumin:creatinine of 300 or higher is indicative of overt proteinuria.  Due to biologic variability, positive results should be confirmed by a second, first-morning random or 24-hour timed urine specimen. If there is discrepancy, a third specimen is recommended. When 2 out of 3 results are in the microalbuminuria range, this is evidence for incipient nephropathy and warrants increased efforts at glucose control, blood pressure control, and institution of therapy with an angiotensin-converting-enzyme (ACE) inhibitor (if the patient can tolerate it).      CRP Inflammation  12/19/2024 <3.00  <5.00 mg/L Final    WBC Count 12/19/2024 6.8  4.0 - 11.0 10e3/uL Final    RBC Count 12/19/2024 5.35  4.40 - 5.90 10e6/uL Final    Hemoglobin 12/19/2024 15.8  13.3 - 17.7 g/dL Final    Hematocrit 12/19/2024 46.5  40.0 - 53.0 % Final    MCV 12/19/2024 87  78 - 100 fL Final    MCH 12/19/2024 29.5  26.5 - 33.0 pg Final    MCHC 12/19/2024 34.0  31.5 - 36.5 g/dL Final    RDW 12/19/2024 12.4  10.0 - 15.0 % Final    Platelet Count 12/19/2024 201  150 - 450 10e3/uL Final    % Neutrophils 12/19/2024 57  % Final    % Lymphocytes 12/19/2024 25  % Final    % Monocytes 12/19/2024 10  % Final    % Eosinophils 12/19/2024 8  % Final    % Basophils 12/19/2024 1  % Final    % Immature Granulocytes 12/19/2024 0  % Final    Absolute Neutrophils 12/19/2024 3.9  1.6 - 8.3 10e3/uL Final    Absolute Lymphocytes 12/19/2024 1.7  0.8 - 5.3 10e3/uL Final    Absolute Monocytes 12/19/2024 0.7  0.0 - 1.3 10e3/uL Final    Absolute Eosinophils 12/19/2024 0.5  0.0 - 0.7 10e3/uL Final    Absolute Basophils 12/19/2024 0.0  0.0 - 0.2 10e3/uL Final    Absolute Immature Granulocytes 12/19/2024 0.0  <=0.4 10e3/uL Final     No results found for any visits on 02/10/25.  No results found for this or any previous visit (from the past 24 hours).      Phone call duration: 25 minutes  Signed Electronically by: Marco Jolley MD, MD

## 2025-02-12 ENCOUNTER — LAB (OUTPATIENT)
Dept: LAB | Facility: OTHER | Age: 60
End: 2025-02-12
Payer: COMMERCIAL

## 2025-02-12 DIAGNOSIS — R20.0 NUMBNESS: ICD-10-CM

## 2025-02-12 DIAGNOSIS — Z77.018 EXPOSURE TO HEAVY METALS: ICD-10-CM

## 2025-02-12 PROCEDURE — 36415 COLL VENOUS BLD VENIPUNCTURE: CPT

## 2025-02-12 PROCEDURE — 83825 ASSAY OF MERCURY: CPT | Mod: 90

## 2025-02-12 PROCEDURE — 82175 ASSAY OF ARSENIC: CPT | Mod: 90

## 2025-02-12 PROCEDURE — 99000 SPECIMEN HANDLING OFFICE-LAB: CPT

## 2025-02-12 PROCEDURE — 83655 ASSAY OF LEAD: CPT | Mod: 90

## 2025-02-15 LAB
ARSENIC BLD-MCNC: <10 UG/L
LEAD BLDV-MCNC: <2 UG/DL
MERCURY BLD-MCNC: <2.5 UG/L

## 2025-02-16 LAB
ARSENIC 24H UR-MRATE: NORMAL UG/D
ARSENIC UR-MCNC: <10 UG/L
ARSENIC/CREAT UR: NORMAL UG/G CRT
CREAT 24H UR-MRATE: NORMAL MG/D
CREAT UR-MCNC: 68 MG/DL
LEAD 24H UR-MRATE: NORMAL UG/D
LEAD UR-MCNC: <5 UG/L
LEAD/CREAT UR: NORMAL UG/G CRT
MERCURY 24H UR-MRATE: NORMAL UG/D
MERCURY UR-MCNC: <2.5 UG/L
MERCURY/CREAT UR: NORMAL UG/G CRT

## 2025-02-19 ENCOUNTER — MYC MEDICAL ADVICE (OUTPATIENT)
Dept: FAMILY MEDICINE | Facility: OTHER | Age: 60
End: 2025-02-19
Payer: COMMERCIAL

## 2025-02-19 NOTE — RESULT ENCOUNTER NOTE
Rajinder,    The good news is your EMG was mostly normal.  It did show some neuropathy related to the length of nerve fibers.  This probably indicates that there is not a problem in your spine.  Your neuropathy would be more likely related to insulin resistance/prediabetes.  Keeping your blood sugars under control should help with this.  L-arginine may also provide some benefit.  I would strongly recommend a trial of physical therapy if you have not pursue that.    If you prefer, we could certainly get you in for a neurology appointment for to see spine, but your EMG does not indicate that this would likely be beneficial.    Thanks,    Dr. Jolley

## 2025-02-21 ENCOUNTER — TELEPHONE (OUTPATIENT)
Dept: GASTROENTEROLOGY | Facility: CLINIC | Age: 60
End: 2025-02-21
Payer: COMMERCIAL

## 2025-02-21 NOTE — TELEPHONE ENCOUNTER
Rescheduled Colonoscopy  Due to scheduling conflict    Pre visit planning completed.      Procedure details:    Patient scheduled for Colonoscopy on 3/10/25.     Arrival time: 1530. Procedure time 1630    Facility location: [facility]; [address] [place] [address] , [place] [id number]. Check in location: Main entrance at Surgery registation desk.    Sedation type: MAC    Pre op exam needed? No.    Indication for procedure: Screening      Chart review:     Electronic implanted devices? No    Recent diagnosis of diverticulitis within the last 6 weeks? No      Medication review:    Diabetic? Yes. Oral diabetic medications: Metformin (glucophage): HOLD day of procedure.    Anticoagulants? No    Weight loss medication/injectable? No GLP-1 medication per patient's medication list. Nursing to verify with pre-assessment call.    Other medication HOLDING recommendations:  N/A      Prep for procedure:     Bowel prep recommendation: Standard Golytely. Bowel prep sent to  [facility] [address] [place] [id number] [facility] (Prep sent 1/24/25, verify if patient picked up)    Due to: diabetes    Prep instructions sent via cash Beltrán LPN  Endoscopy Procedure Pre Assessment   [phone] option 3

## 2025-02-24 NOTE — TELEPHONE ENCOUNTER
I see no form in my inbox.  It appears that patient just needs his previously filled out form sent to [employer].  Please ensure this happens in a timely fashion.  This is overdue.

## 2025-02-24 NOTE — TELEPHONE ENCOUNTER
Attempted to contact patient in order to complete pre assessment questions.     Pt is driving and will call back to complete PA    Callback communication sent via [employer].    Jennifer Beltrán LPN

## 2025-02-24 NOTE — TELEPHONE ENCOUNTER
Pre assessment completed for upcoming procedure.   (Please see previous telephone encounter notes for complete details)    Patient returned call.       Procedure details:    Arrival time and facility location reviewed.    Pre op exam needed? No.    Designated  policy reviewed. Instructed to have someone stay 24  hours post procedure.       Medication review:    Medications reviewed. Please see supporting documentation below. Holding recommendations discussed (if applicable).   Oral diabetic medication(s): Metformin (glucophage): HOLD day of procedure.      Prep for procedure:    Patient confirmed he already has prep prescriptions picked up from pharmacy.     Procedure prep instructions reviewed.        Any additional information needed:  N/A      Patient verbalized understanding and had no questions or concerns at this time.      Shira Sheldon RN  Endoscopy Procedure Pre Assessment   [phone] option 3

## 2025-03-03 ENCOUNTER — VIRTUAL VISIT (OUTPATIENT)
Dept: FAMILY MEDICINE | Facility: OTHER | Age: 60
End: 2025-03-03
Payer: COMMERCIAL

## 2025-03-03 DIAGNOSIS — G62.9 PERIPHERAL POLYNEUROPATHY: Primary | ICD-10-CM

## 2025-03-03 DIAGNOSIS — R73.03 PREDIABETES: ICD-10-CM

## 2025-03-03 DIAGNOSIS — R20.0 NUMBNESS: ICD-10-CM

## 2025-03-03 PROCEDURE — 98006 SYNCH AUDIO-VIDEO EST MOD 30: CPT | Performed by: FAMILY MEDICINE

## 2025-03-03 NOTE — PROGRESS NOTES
Rajinder is a 60 year old who is being evaluated via a billable video visit.    How would you like to obtain your AVS? MyChart  If the video visit is dropped, the invitation should be resent by: Text to cell phone: [phone]  Will anyone else be joining your video visit? No      Assessment & Plan       ICD-10-CM    1. Peripheral polyneuropathy  G62.9 Physical Therapy  Referral      2. Numbness  R20.0 Physical Therapy  Referral      3. Prediabetes  R73.03            Assessment & Plan     Peripheral Neuropathy  Peripheral neuropathy, likely secondary to prediabetes, presenting with a 'buzzy' sensation, numbness, and tingling in the lower extremities, particularly the left leg. Symptoms are exacerbated by prolonged standing and physical activity. EMG findings suggest tibial innervation crossover with no response in some nerves, but no spinal involvement. He prefers non-pharmacological interventions. Discussed potential benefits of L-Arginine and physical therapy. Physical therapy may help prevent symptom exacerbation but may not significantly reduce pain. He prefers to wait on a neurology referral. Discussed part-time work to assess tolerance and symptom management.  - Continue L-Arginine 500 mg, increase to 1500 mg per day if needed  - Refer to physical therapy to learn techniques to prevent symptom exacerbation  - Consider neurology referral if symptoms worsen or for further evaluation  - Implement part-time work schedule for four weeks starting March 13, 2025, to assess tolerance and symptom management    Prediabetes  Prediabetes with an A1c of 6.1. He is managing blood sugar levels through a low sugar, low carb diet and aims to lose 25 pounds to improve glycemic control. The goal is to reduce A1c to below 5.7 to potentially discontinue medication. Lowering blood sugar levels may help improve neuropathy symptoms and prevent progression.  - Continue low sugar, low carb diet  - Monitor blood sugar  levels regularly  - Reassess A1c in follow-up visit to consider medication adjustment    General Health Maintenance  Focused on lifestyle modifications to manage prediabetes and peripheral neuropathy.  - Encourage continued adherence to low sugar, low carb diet  - Promote weight loss to achieve a target of 25 pounds reduction    Follow-up  - Follow up with physical therapy referral  - Monitor response to part-time work schedule  - Reassess A1c and overall health status in the next visit.               Portions of this note were completed using [employer] and/or Dragon dictation software.  Verbal patient consent received prior to use of AI software.  Although reviewed, there may be typographical and other inadvertent errors that remain.           Patient Instructions   Get me your paperwork and we'll plan on getting you on half-time on 3/13/25.      Can try PT to help with planning for work.    Can increase L-arginine to 1500 mg/day if needed.    Let me know if you decide you want to see neurology.    Contact us or return if questions or concerns.    Have a nice day!    Dr. Shola Hall   Rajinder is a 60 year old, presenting for the following health issues:  RECHECK      3/3/2025    11:25 AM   Additional Questions   Roomed by AARON     History of Present Illness       Reason for visit:  Review of neuropathy of legs ahd back numbness and tingling.    He eats 2-3 servings of fruits and vegetables daily.He consumes 0 sweetened beverage(s) daily.He exercises with enough effort to increase his heart rate 9 or less minutes per day.  He exercises with enough effort to increase his heart rate 3 or less days per week.   He is taking medications regularly.          History of Present Illness    Rajinder Siegel is a 60 year old male with peripheral neuropathy who presents with concerns about returning to work and symptom management. He is accompanied by his wife.    He experiences a 'buzzy, weird sensation' in his legs,  which is not typically painful but can become uncomfortable. This sensation started in his left leg and has since affected his right leg. He describes numbness in his left shin and tingling in both calves, with occasional cramping sensations. His right shin muscle feels 'weird' and tender, though not painful. No foot drop or weakness in his feet.    He has been taking L-Arginine since he stopped working, which coincided with an improvement in symptoms. He is currently taking 500 mg of L-Arginine twice daily. He has not tried medications such as duloxetine, gabapentin, or Lyrica and is reluctant to start new medications.    He mentions a previous EMG report indicating 'no response' in some nerves, which he interpreted as possible nerve damage. He is uncertain about the implications of these findings and is seeking clarification.    He is concerned about the potential for his symptoms to worsen if he returns to work, which involves standing for long periods and performing physically demanding tasks. He is considering early FDC due to his symptoms and the physical demands of his job.    He is exploring options to manage his symptoms, including dietary changes aimed at reducing blood sugar levels, as he has been diagnosed with prediabetes. He has started a low sugar, low carb diet with the goal of losing 25 pounds and improving his blood sugar control.         4 weeks of half time, planned.            Objective    Vitals - Patient Reported  Weight (Patient Reported): 97.5 kg (215 lb)        Physical Exam   GENERAL: alert and no distress  EYES: Eyes grossly normal to inspection.  No discharge or erythema, or obvious scleral/conjunctival abnormalities.  RESP: No audible wheeze, cough, or visible cyanosis.    SKIN: Visible skin clear. No significant rash, abnormal pigmentation or lesions.  NEURO: Cranial nerves grossly intact.  Mentation and speech appropriate for age.  PSYCH: Appropriate affect, tone, and pace of  words    Lab on 02/12/2025   Component Date Value Ref Range Status    Creatinine, Urine per volume 02/12/2025 68  mg/dL Final    Creatinine, Urine per 24hr 02/12/2025 Not Applicable  800 - 2100 mg/d Final    Performed By: [facility]  [address] [place] [id number]  : Candelario Vale MD, PhD  IA Number: [id number]    Arsenic Urine - per volume 02/12/2025 <10.0  0.0 - 34.9 ug/L Final    INTERPRETIVE INFORMATION: Arsenic, Urine w/ Reflex to   Fractionated    The ACGIH Biological Exposure Index (MOON) for arsenic in   urine is 35 ug/L. The ACGIH MOON is based on the sum of   inorganic and methylated species. For specimens with   elevated total arsenic results, fractionation is   automatically performed to determine the proportions of   inorganic, methylated and organic species.      This test was developed and its performance characteristics   determined by [facility]. It has not been cleared or   approved by the US Food and Drug Administration. This test   was performed in a CLIA certified laboratory and is   intended for clinical purposes.    Arsenic Urine - per 24h 02/12/2025 Not Applicable  0.0 - 49.9 ug/d Final    Mercury, Urine - per 24h 02/12/2025 Not Applicable  0.0 - 20.0 ug/d Final    Mercury, Urine - per volume 02/12/2025 <2.5  0.0 - 5.0 ug/L Final    INTERPRETIVE INFORMATION:  Mercury, Urine     Urinary mercury levels predominantly reflect acute or   chronic elemental or inorganic mercury exposure. Urine   concentrations in unexposed individuals are typically less   than 10 ug/L. 24 hour urine concentrations of 30 to 100   ug/L may be associated with subclinical neuropsychiatric   symptoms and tremors. Concentrations greater than 100 ug/L   can be associated with overt neuropsychiatric disturbances   and tremors. Urine mercury levels may be useful in   monitoring chelation therapy.    This test was developed and its performance characteristics   determined by  [facility]. It has not been cleared or   approved by the US Food and Drug Administration. This test   was performed in a CLIA certified laboratory and is   intended for clinical purposes.    Mercury, Urine - ratio to CRT 02/12/2025 Not Applicable  0.0 - 20.0 ug/g CRT Final      Unable to accurately calculate the creatinine normalized   result due to a low per volume result.    Arsenic, Urine - ratio to CRT 02/12/2025 Not Applicable  0.0 - 29.9 ug/g CRT Final      Unable to accurately calculate the creatinine normalized   result due to a low per volume result.    Lead, Urine - per 24h 02/12/2025 Not Applicable  0.0 - 8.1 ug/d Final    Lead, Urine - per volume 02/12/2025 <5.0  0.0 - 5.0 ug/L Final    INTERPRETIVE INFORMATION: Lead, Urine    Quantification of urine excretion rates before or after   chelation therapy has been used as an indicator of lead   exposure. Urinary excretion of >125 mg of lead per 24 hours   is usually associated with related evidence of lead   toxicity.    This test was developed and its performance characteristics   determined by [facility]. It has not been cleared or   approved by the US Food and Drug Administration. This test   was performed in a CLIA certified laboratory and is   intended for clinical purposes.    Lead, Urine - ratio to CRT 02/12/2025 Not Applicable  0.0 - 5.0 ug/g CRT Final      Unable to accurately calculate the creatinine normalized   result due to a low per volume result.    Arsenic 02/12/2025 <10.0  <=12.0 ug/L Final    INTERPRETIVE INFORMATION: Arsenic, Blood    Elevated results may be due to skin or collection-related   contamination, including the use of a noncertified   metal-free collection/transport tube. If contamination   concerns exist due to elevated levels of blood arsenic,   confirmation with a second specimen collected in a   certified metal-free tube is recommended.     Potentially toxic ranges for blood arsenic: Greater than or   equal to  "600 ug/L.    Blood arsenic is for the detection of recent exposure   poisoning only. Blood arsenic levels in healthy subjects   vary considerably with exposure to arsenic in the diet and   the environment. A 24-hour urine arsenic is useful for the   detection of chronic exposure.     This test was developed and its performance characteristics   determined by [facility]. It has not been cleared or   approved by the US Food and Drug Administration. This test   was performed in a CLIA certified laboratory and is   intended for clinical purposes.    Lead Venous Blood 02/12/2025 <2.0  <=4.9 ug/dL Final    Comment: INTERPRETIVE INFORMATION: Lead, Blood (Venous)    Analysis performed by Inductively Coupled Plasma-Mass   Spectrometry (ICP-MS).    Elevated results may be due to skin or collection-related   contamination, including the use of a noncertified   lead-free tube. If contamination concerns exist due to   elevated levels of blood lead, confirmation with a second   specimen collected in a certified lead-free tube is   recommended.    Information sources for blood lead reference intervals and   interpretive comments include the CDC's \"Childhood Lead   Poisoning Prevention: Recommended Actions Based on Blood   Lead Level\" and the \"Adult Blood Lead Epidemiology and   Surveillance: Reference Blood Lead Levels (BLLs) for Adults   in the [place].\" Thresholds and time intervals for retesting,   medical evaluation, and response vary by state and   regulatory body. Contact your State Department of Health   and/or applicable regulatory agency for specific guidance   on medical management                            recommendations.    This test was developed and its performance characteristics   determined by [facility]. It has not been cleared or   approved by the [place].S. Food and Drug Administration. This   test was performed in a CLIA-certified laboratory and is   intended for clinical purposes.         Group          " Concentration   Comment    Children       3.5-19.9 ug/dL  Children under the age of 6                                 years are the most vulnerable                                 to the harmful effects of                                  lead exposure. Environmental                                  investigation and exposure                                  history to identify potential                                 sources of lead. Biological                                  and nutritional monitoring                                 are recommended. Follow-up                                  blood lead monitoring is                                  recommended.                                                           20-44.9 ug/dL   Lead hazard reduction and                                  prompt medical evaluation are                                 recommended. Contact a                                  Pediatric Environmental                                  Health Specialty Unit or                                  poison control center for                                  guidance.                   Greater than    Critical. Immediate medical                  44.9 ug/dL      evaluation, including                                  detailed neurological exam is                                 recommended. Consider                                  chelation therapy when                                 symptoms of lead toxicity   are                                  present. Contact a Pediatric                                  Environmental Health                                  Specialty Unit or poison                                  control center for                                                             assistance.    Adult          5-19.9 ug/dL    Medical removal is                                  recommended for pregnant                                  women or those who are trying                                  or may become pregnant.                                  Adverse health effects are                                  possible. Reduced lead                                  exposure and increased blood                                  lead monitoring are                                  recommended.                    20-69.9 ug/dL   Adverse health effects are                                  indicated. Medical removal                                  from lead exposure is                                  required by OSHA if blood                                  lead level exceeds 50 ug/dL.                                 Prompt medical evaluation is                                 recommended.                    Greater than    Critical. Immediate medical                                             69.9 ug/dL      evaluation is recommended.                                  Consider chelation therapy                                 when symptoms of lead                                  toxicity are present.    Mercury 02/12/2025 <2.5  <=10.0 ug/L Final    Comment: INTERPRETIVE INFORMATION: Mercury, Blood    Elevated results may be due to skin or collection-related   contamination, including the use of a noncertified   metal-free collection/transport tube. If contamination   concerns exist due to elevated levels of blood mercury,   confirmation with a second specimen collected in a   certified metal-free tube is recommended.    Blood mercury levels predominantly reflect recent exposure   and are most useful in the diagnosis of acute poisoning as   blood mercury concentrations rise sharply and fall quickly   over several days after ingestion. Blood concentrations in   unexposed individuals rarely exceed 20 ug/L. The provided   reference interval relates to inorganic mercury   concentrations. Dietary and non-occupational exposure to   organic mercury forms may contribute to an elevated total   mercury  result. Clinical presentation after toxic exposure   to organic mercury may include dysarthria, ataxia and   constricted vision fields with mercury                            blood concentrations   from 20 to 50 ug/L.     This test was developed and its performance characteristics   determined by [facility]. It has not been cleared or   approved by the US Food and Drug Administration. This test   was performed in a CLIA certified laboratory and is   intended for clinical purposes.  Performed By: [facility]  [address] [place] [address]  : Candelario Vale MD, PhD  CLIA Number: [id number]     No results found for any visits on 03/03/25.  No results found for this or any previous visit (from the past 24 hours).      Video-Visit Details    Type of service:  Video Visit   Originating Location (pt. Location): Home    Distant Location (provider location):  On-site  Platform used for Video Visit: Christen  Signed Electronically by: Marco Jolley MD, MD

## 2025-03-03 NOTE — Clinical Note
March 3, 2025      Rajinder Siegel  [address] [facility] [address]        To Whom It May Concern:    Rajinder Siegel  was seen on ***.  Please excuse him  until *** due to {WORK EXCUSE:665862}.        Sincerely,        Marco Jolley MD, MD    Electronically signed

## 2025-03-04 PROBLEM — G62.9 PERIPHERAL POLYNEUROPATHY: Status: ACTIVE | Noted: 2025-03-04

## 2025-03-04 PROBLEM — R20.0 NUMBNESS: Status: ACTIVE | Noted: 2025-03-04

## 2025-03-07 ENCOUNTER — MEDICAL CORRESPONDENCE (OUTPATIENT)
Dept: HEALTH INFORMATION MANAGEMENT | Facility: CLINIC | Age: 60
End: 2025-03-07
Payer: COMMERCIAL

## 2025-03-07 ENCOUNTER — TELEPHONE (OUTPATIENT)
Dept: FAMILY MEDICINE | Facility: OTHER | Age: 60
End: 2025-03-07
Payer: COMMERCIAL

## 2025-03-07 NOTE — TELEPHONE ENCOUNTER
INCOMING FORMS    Sender: [facility]     Type of Form, letter or note (What is requested?): order    How was the form received?: Fax    How should forms be returned?:  Fax : [phone]    Form placed in DJ bin for review/signature if appropriate.

## 2025-03-10 ENCOUNTER — HOSPITAL ENCOUNTER (OUTPATIENT)
Facility: CLINIC | Age: 60
Discharge: HOME | End: 2025-03-10
Attending: SURGERY | Admitting: SURGERY
Payer: COMMERCIAL

## 2025-03-10 ENCOUNTER — ANESTHESIA EVENT (OUTPATIENT)
Dept: GASTROENTEROLOGY | Facility: CLINIC | Age: 60
End: 2025-03-10
Payer: COMMERCIAL

## 2025-03-10 ENCOUNTER — ANESTHESIA (OUTPATIENT)
Dept: GASTROENTEROLOGY | Facility: CLINIC | Age: 60
End: 2025-03-10
Payer: COMMERCIAL

## 2025-03-10 VITALS
SYSTOLIC BLOOD PRESSURE: 135 MMHG | RESPIRATION RATE: 15 BRPM | HEART RATE: 73 BPM | OXYGEN SATURATION: 96 % | DIASTOLIC BLOOD PRESSURE: 93 MMHG | TEMPERATURE: 97.8 F

## 2025-03-10 LAB
COLONOSCOPY: NORMAL
GLUCOSE BLDC GLUCOMTR-MCNC: 106 MG/DL (ref 70–99)

## 2025-03-10 PROCEDURE — 370N000017 HC ANESTHESIA TECHNICAL FEE, PER MIN: Performed by: SURGERY

## 2025-03-10 PROCEDURE — 45380 COLONOSCOPY AND BIOPSY: CPT | Performed by: SURGERY

## 2025-03-10 PROCEDURE — 88305 TISSUE EXAM BY PATHOLOGIST: CPT | Mod: TC | Performed by: SURGERY

## 2025-03-10 PROCEDURE — 250N000011 HC RX IP 250 OP 636: Performed by: NURSE ANESTHETIST, CERTIFIED REGISTERED

## 2025-03-10 PROCEDURE — 45385 COLONOSCOPY W/LESION REMOVAL: CPT | Performed by: SURGERY

## 2025-03-10 PROCEDURE — 250N000009 HC RX 250: Performed by: NURSE ANESTHETIST, CERTIFIED REGISTERED

## 2025-03-10 PROCEDURE — 82962 GLUCOSE BLOOD TEST: CPT

## 2025-03-10 PROCEDURE — 258N000003 HC RX IP 258 OP 636: Performed by: NURSE ANESTHETIST, CERTIFIED REGISTERED

## 2025-03-10 RX ORDER — NALOXONE HYDROCHLORIDE 0.4 MG/ML
0.4 INJECTION, SOLUTION INTRAMUSCULAR; INTRAVENOUS; SUBCUTANEOUS
Status: DISCONTINUED | OUTPATIENT
Start: 2025-03-10 | End: 2025-03-10 | Stop reason: HOSPADM

## 2025-03-10 RX ORDER — SODIUM CHLORIDE, SODIUM LACTATE, POTASSIUM CHLORIDE, CALCIUM CHLORIDE 600; 310; 30; 20 MG/100ML; MG/100ML; MG/100ML; MG/100ML
INJECTION, SOLUTION INTRAVENOUS CONTINUOUS
Status: DISCONTINUED | OUTPATIENT
Start: 2025-03-10 | End: 2025-03-10 | Stop reason: HOSPADM

## 2025-03-10 RX ORDER — LIDOCAINE HYDROCHLORIDE 10 MG/ML
INJECTION, SOLUTION INFILTRATION; PERINEURAL PRN
Status: DISCONTINUED | OUTPATIENT
Start: 2025-03-10 | End: 2025-03-10

## 2025-03-10 RX ORDER — PROPOFOL 10 MG/ML
INJECTION, EMULSION INTRAVENOUS CONTINUOUS PRN
Status: DISCONTINUED | OUTPATIENT
Start: 2025-03-10 | End: 2025-03-10

## 2025-03-10 RX ORDER — ONDANSETRON 2 MG/ML
4 INJECTION INTRAMUSCULAR; INTRAVENOUS EVERY 30 MIN PRN
Status: DISCONTINUED | OUTPATIENT
Start: 2025-03-10 | End: 2025-03-10 | Stop reason: HOSPADM

## 2025-03-10 RX ORDER — ONDANSETRON 4 MG/1
4 TABLET, ORALLY DISINTEGRATING ORAL EVERY 30 MIN PRN
Status: DISCONTINUED | OUTPATIENT
Start: 2025-03-10 | End: 2025-03-10 | Stop reason: HOSPADM

## 2025-03-10 RX ORDER — ONDANSETRON 2 MG/ML
4 INJECTION INTRAMUSCULAR; INTRAVENOUS EVERY 6 HOURS PRN
Status: DISCONTINUED | OUTPATIENT
Start: 2025-03-10 | End: 2025-03-10 | Stop reason: HOSPADM

## 2025-03-10 RX ORDER — PROPOFOL 10 MG/ML
INJECTION, EMULSION INTRAVENOUS PRN
Status: DISCONTINUED | OUTPATIENT
Start: 2025-03-10 | End: 2025-03-10

## 2025-03-10 RX ORDER — LIDOCAINE 40 MG/G
CREAM TOPICAL
Status: DISCONTINUED | OUTPATIENT
Start: 2025-03-10 | End: 2025-03-10 | Stop reason: HOSPADM

## 2025-03-10 RX ORDER — ONDANSETRON 4 MG/1
4 TABLET, ORALLY DISINTEGRATING ORAL EVERY 6 HOURS PRN
Status: DISCONTINUED | OUTPATIENT
Start: 2025-03-10 | End: 2025-03-10 | Stop reason: HOSPADM

## 2025-03-10 RX ORDER — DEXAMETHASONE SODIUM PHOSPHATE 10 MG/ML
4 INJECTION, SOLUTION INTRAMUSCULAR; INTRAVENOUS
Status: DISCONTINUED | OUTPATIENT
Start: 2025-03-10 | End: 2025-03-10 | Stop reason: HOSPADM

## 2025-03-10 RX ORDER — FLUMAZENIL 0.1 MG/ML
0.2 INJECTION, SOLUTION INTRAVENOUS
Status: DISCONTINUED | OUTPATIENT
Start: 2025-03-10 | End: 2025-03-10 | Stop reason: HOSPADM

## 2025-03-10 RX ORDER — NALOXONE HYDROCHLORIDE 0.4 MG/ML
0.2 INJECTION, SOLUTION INTRAMUSCULAR; INTRAVENOUS; SUBCUTANEOUS
Status: DISCONTINUED | OUTPATIENT
Start: 2025-03-10 | End: 2025-03-10 | Stop reason: HOSPADM

## 2025-03-10 RX ORDER — ONDANSETRON 2 MG/ML
4 INJECTION INTRAMUSCULAR; INTRAVENOUS
Status: DISCONTINUED | OUTPATIENT
Start: 2025-03-10 | End: 2025-03-10 | Stop reason: HOSPADM

## 2025-03-10 RX ORDER — NALOXONE HYDROCHLORIDE 0.4 MG/ML
0.1 INJECTION, SOLUTION INTRAMUSCULAR; INTRAVENOUS; SUBCUTANEOUS
Status: DISCONTINUED | OUTPATIENT
Start: 2025-03-10 | End: 2025-03-10 | Stop reason: HOSPADM

## 2025-03-10 RX ORDER — PROCHLORPERAZINE MALEATE 5 MG/1
10 TABLET ORAL EVERY 6 HOURS PRN
Status: DISCONTINUED | OUTPATIENT
Start: 2025-03-10 | End: 2025-03-10 | Stop reason: HOSPADM

## 2025-03-10 RX ADMIN — SODIUM CHLORIDE, POTASSIUM CHLORIDE, SODIUM LACTATE AND CALCIUM CHLORIDE: 600; 310; 30; 20 INJECTION, SOLUTION INTRAVENOUS at 12:23

## 2025-03-10 RX ADMIN — PROPOFOL 200 MCG/KG/MIN: 10 INJECTION, EMULSION INTRAVENOUS at 12:26

## 2025-03-10 RX ADMIN — PROPOFOL 50 MG: 10 INJECTION, EMULSION INTRAVENOUS at 12:26

## 2025-03-10 RX ADMIN — LIDOCAINE HYDROCHLORIDE 50 MG: 10 INJECTION, SOLUTION INFILTRATION; PERINEURAL at 12:26

## 2025-03-10 ASSESSMENT — ACTIVITIES OF DAILY LIVING (ADL)
ADLS_ACUITY_SCORE: 41
ADLS_ACUITY_SCORE: 41

## 2025-03-10 NOTE — ANESTHESIA PREPROCEDURE EVALUATION
Anesthesia Pre-Procedure Evaluation    Patient: Rajinder Siegel   MRN: [id number] : 1965        Procedure : Procedure(s):  Colonoscopy          Past Medical History:   Diagnosis Date    Allergic rhinitis due to other allergen 00    Perennial and seasonal allergic rhinitis-abstract    Glucose intolerance (impaired glucose tolerance) 2013    Hyperlipidemia     Low back pain     and pain and numbness left leg    Obstructive sleep apnea 2012    RW RESPIRATORY (ABSTRACTED) 00    Mild intermittent/persistent asthma    Unspecified essential hypertension     Abstract      Past Surgical History:   Procedure Laterality Date    DECOMPRESSION LUMBAR ONE LEVEL  2013    Procedure: DECOMPRESSION LUMBAR ONE LEVEL;  Laminotomy Discectomy L4-5;  Surgeon: Sergei Abarca MD;  Location: [facility] OR    HC LAPAROSCOPY, SURGICAL; CHOLECYSTECTOMY  2008    MANDIBLE SURGERY      MENISCECTOMY Left     ORTHOPEDIC SURGERY  2017    Knee-meniscus removal    TONSILLECTOMY        Allergies   Allergen Reactions    Mold Other (See Comments)     hayfever symptoms    Ragweeds Other (See Comments)     hayfever symptoms      Social History     Tobacco Use    Smoking status: Never     Passive exposure: Never    Smokeless tobacco: Never    Tobacco comments:     04 no second hand smoke at home or work   Substance Use Topics    Alcohol use: No      Wt Readings from Last 1 Encounters:   25 100.7 kg (222 lb)        Anesthesia Evaluation   Pt has had prior anesthetic. Type: General.    No history of anesthetic complications       ROS/MED HX  ENT/Pulmonary:     (+) sleep apnea,          allergic rhinitis,                             Neurologic:     (+)    peripheral neuropathy,                            Cardiovascular:     (+) Dyslipidemia hypertension- -   -  - -                                 Previous cardiac testing   Echo: Date: Results:    Stress Test:  Date: 7/15/24 Results:  Interpretation  Summary  A treadmill exercise test according to the Chi protocol was performed.  The patient exercised 9 minutes.  The patient exhibited no chest pain during exercise.  There was a hypertensive BP response to exercise.  The EKG portion of this stress test was negative for inducible ischemia (see  echo results below).  Normal resting wall motion and no stress-induced wall motion abnormality.     This was a normal stress echocardiogram with no evidence of stress-induced  ischemia.    ECG Reviewed:  Date: 4/22/24 Results:  Sinus Rhythm   WITHIN NORMAL LIMITS  Cath:  Date: Results:      METS/Exercise Tolerance:     Hematologic:  - neg hematologic  ROS     Musculoskeletal:  - neg musculoskeletal ROS     GI/Hepatic:     (+)        bowel prep,            Renal/Genitourinary:  - neg Renal ROS     Endo:     (+)               Obesity,       Psychiatric/Substance Use:  - neg psychiatric ROS     Infectious Disease:  - neg infectious disease ROS     Malignancy:  - neg malignancy ROS     Other:  - neg other ROS          Physical Exam    Airway  airway exam normal      Mallampati: II   TM distance: > 3 FB   Neck ROM: full   Mouth opening: > 3 cm    Respiratory Devices and Support         Dental       (+) Minor Abnormalities - some fillings, tiny chips      Cardiovascular   cardiovascular exam normal       Rhythm and rate: regular and normal     Pulmonary   pulmonary exam normal        breath sounds clear to auscultation           OUTSIDE LABS:  CBC:   Lab Results   Component Value Date    WBC 6.8 12/19/2024    WBC 6.6 04/22/2024    HGB 15.8 12/19/2024    HGB 14.9 04/22/2024    HCT 46.5 12/19/2024    HCT 44.8 04/22/2024     12/19/2024     04/22/2024     BMP:   Lab Results   Component Value Date     07/11/2024     04/22/2024    POTASSIUM 4.1 07/11/2024    POTASSIUM 4.0 04/22/2024    CHLORIDE 107 07/11/2024    CHLORIDE 107 04/22/2024    CO2 24 07/11/2024    CO2 24 04/22/2024    BUN 17.6 07/11/2024    BUN  "23.5 (H) 04/22/2024    CR 0.98 07/11/2024    CR 1.05 04/22/2024     (H) 07/11/2024     (H) 04/22/2024     COAGS: No results found for: \"PTT\", \"INR\", \"FIBR\"  POC: No results found for: \"BGM\", \"HCG\", \"HCGS\"  HEPATIC:   Lab Results   Component Value Date    ALBUMIN 4.6 04/22/2024    PROTTOTAL 6.9 04/22/2024    ALT 34 04/22/2024    AST 41 04/22/2024    GGT 1158 (H) 05/09/2008    ALKPHOS 77 04/22/2024    BILITOTAL 1.3 (H) 04/22/2024     OTHER:   Lab Results   Component Value Date    A1C 6.1 (H) 12/19/2024    BRENDA 8.9 07/11/2024    MAG 2.0 07/11/2024    LIPASE 114 05/03/2008    TSH 3.64 04/22/2024    CRP <2.9 06/10/2022    SED 4 06/05/2009       Anesthesia Plan    ASA Status:  2    NPO Status:  NPO Appropriate    Anesthesia Type: MAC.     - Reason for MAC: immobility needed   Induction: Intravenous, Propofol.   Maintenance: TIVA.        Consents    Anesthesia Plan(s) and associated risks, benefits, and realistic alternatives discussed. Questions answered and patient/representative(s) expressed understanding.     - Discussed: Risks, Benefits and Alternatives for BOTH SEDATION and the PROCEDURE were discussed     - Discussed with:  Patient      - Extended Intubation/Ventilatory Support Discussed: No.      - Patient is DNR/DNI Status: No     Use of blood products discussed: No .     Postoperative Care       PONV prophylaxis: Background Propofol Infusion     Comments:               CEE Manuel CRNA    I have reviewed the pertinent notes and labs in the chart from the past 30 days and (re)examined the patient.  Any updates or changes from those notes are reflected in this note.    Clinically Significant Risk Factors Present on Admission                   # Hypertension: Noted on problem list                        "

## 2025-03-10 NOTE — LETTER
Rajinder Siegel  [address] [facility] [address]  March 12, 2025    Dear Rajinder,  This letter is to inform you of the results of your pathology report from your colonoscopy.  Your pathology report was:  Final Diagnosis   Colon, sigmoid, polypectomy:  --Tubular adenoma.   These are benign polyps. These types of polyps do carry a small risk of developing into a cancer over time if not removed. Yours were completely removed at the time of your colonoscopy. You should have another surveillance colonoscopy in 7 years.  If you have further questions please don t hesitate to call our clinic at [phone].   Sincerely,     Trip Armenta, DO

## 2025-03-10 NOTE — H&P
Patient seen for Endoscopy    HPI:  Patient is a 60 year old male here for endoscopy. Not currently taking blood thinning medications/held for procedure if they do. No recent MI or CVA history. No issues with previous sedation. No recent acute illness.    Review Of Systems    Skin: negative  Ears/Nose/Throat: negative  Respiratory: No shortness of breath, dyspnea on exertion, cough, or hemoptysis  Cardiovascular: negative  Gastrointestinal: negative  Genitourinary: negative  Musculoskeletal: negative  Neurologic: negative  Hematologic/Lymphatic/Immunologic: negative  Endocrine: negative      Past Medical History:   Diagnosis Date    Allergic rhinitis due to other allergen 09/29/00    Perennial and seasonal allergic rhinitis-abstract    Glucose intolerance (impaired glucose tolerance) 8/27/2013    Hyperlipidemia     Low back pain     and pain and numbness left leg    Obstructive sleep apnea 1/6/2012    RW RESPIRATORY (ABSTRACTED) 09/29/00    Mild intermittent/persistent asthma    Unspecified essential hypertension 01/98    Abstract       Past Surgical History:   Procedure Laterality Date    DECOMPRESSION LUMBAR ONE LEVEL  03/27/2013    Procedure: DECOMPRESSION LUMBAR ONE LEVEL;  Laminotomy Discectomy L4-5;  Surgeon: Sergei Abarca MD;  Location:  OR     LAPAROSCOPY, SURGICAL; CHOLECYSTECTOMY  06/05/2008    MANDIBLE SURGERY      MENISCECTOMY Left     ORTHOPEDIC SURGERY  2017    Knee-meniscus removal    TONSILLECTOMY         Family History   Problem Relation Age of Onset    Diabetes Mother     Hypertension Mother     Hyperlipidemia Mother     Alzheimer Disease Father     Cancer Father         Skin Cancer    Asthma Sister     Obesity Sister     Coronary Artery Disease Brother     Allergies Brother     Cancer Brother         Skin cancer    Coronary Artery Disease Brother     C.A.D. Brother         stent  at age 42    Myocardial Infarction Brother     Coronary Artery Disease Brother     Cerebrovascular Disease  Paternal Grandmother     Prostate Cancer No family hx of     Cancer - colorectal No family hx of     Breast Cancer No family hx of        Social History     Socioeconomic History    Marital status:      Spouse name: Olga    Number of children: 4    Years of education: 14    Highest education level: Not on file   Occupational History    Occupation: customer support tech.     Comment: [employer]     Employer: [employer]   Tobacco Use    Smoking status: Never     Passive exposure: Never    Smokeless tobacco: Never    Tobacco comments:     9/7/04 no second hand smoke at home or work   Vaping Use    Vaping status: Never Used   Substance and Sexual Activity    Alcohol use: No    Drug use: No    Sexual activity: Yes     Partners: Female   Other Topics Concern    Parent/sibling w/ CABG, MI or angioplasty before 65F 55M? Yes     Comment: Brothers   Social History Narrative    Not on file     Social Drivers of Health     Financial Resource Strain: Low Risk  (4/21/2024)    Financial Resource Strain     Within the past 12 months, have you or your family members you live with been unable to get utilities (heat, electricity) when it was really needed?: No   Food Insecurity: Low Risk  (4/21/2024)    Food Insecurity     Within the past 12 months, did you worry that your food would run out before you got money to buy more?: No     Within the past 12 months, did the food you bought just not last and you didn t have money to get more?: No   Transportation Needs: Low Risk  (4/21/2024)    Transportation Needs     Within the past 12 months, has lack of transportation kept you from medical appointments, getting your medicines, non-medical meetings or appointments, work, or from getting things that you need?: No   Physical Activity: Insufficiently Active (4/21/2024)    Exercise Vital Sign     Days of Exercise per Week: 3 days     Minutes of Exercise per Session: 20 min   Stress: No Stress Concern Present  (4/21/2024)    [employer] [place] [place] [employer] - Occupational Stress Questionnaire     Feeling of Stress : Only a little   Social Connections: Unknown (4/21/2024)    Social Connection and Isolation Panel [NHANES]     Frequency of Communication with Friends and Family: Not on file     Frequency of Social Gatherings with Friends and Family: Patient declined     Attends Ep[employer] Services: Not on file     Active Member of Clubs or Organizations: Not on file     Attends Club or Organization Meetings: Not on file     Marital Status: Not on file   Interpersonal Safety: Low Risk  (4/22/2024)    Interpersonal Safety     Do you feel physically and emotionally safe where you currently live?: Yes     Within the past 12 months, have you been hit, slapped, kicked or otherwise physically hurt by someone?: No     Within the past 12 months, have you been humiliated or emotionally abused in other ways by your partner or ex-partner?: No   Housing Stability: Low Risk  (4/21/2024)    Housing Stability     Do you have housing? : Yes     Are you worried about losing your housing?: No       No current outpatient medications on file.       Medications and history reviewed    Physical exam:  Vitals: BP (!) 162/86   Pulse 67   Temp 97.8  F (36.6  C) (Oral)   Resp 16   SpO2 98%   BMI= There is no height or weight on file to calculate BMI.    Constitutional: Healthy, alert, non-distressed   Head: Normo-cephalic, atraumatic, no lesions, masses or tenderness   Cardiovascular: RRR, no new murmurs, +S1, +S2 heart sounds, no clicks, rubs or gallops   Respiratory: CTAB, no rales, rhonchi or wheezing, equal chest rise, good respiratory effort   Gastrointestinal: Soft, non-tender, non distended, no rebound rigidity or guarding, no masses or hernias palpated   : Deferred  Musculoskeletal: Moves all extremities, normal  strength, no deformities noted   Skin: No suspicious lesions or rashes   Psychiatric: Mentation appears normal,  affect appropriate   Hematologic/Lymphatic/Immunologic: Normal cervical and supraclavicular lymph nodes   Patient able to get up on table without difficulty.    Labs show:  Results for orders placed or performed during the hospital encounter of 03/10/25 (from the past 24 hours)   Glucose by meter   Result Value Ref Range    GLUCOSE BY METER POCT 106 (H) 70 - 99 mg/dL       Assessment: Endoscopy  Plan: Pt cleared for anesthesia for proposed procedure.    Trip Armenta, DO

## 2025-03-10 NOTE — ANESTHESIA CARE TRANSFER NOTE
Patient: Rajinder Siegel    Procedure: Procedure(s):  COLONOSCOPY, WITH POLYPECTOMY       Diagnosis: Screen for colon cancer [Z12.11]  Diagnosis Additional Information: No value filed.    Anesthesia Type:   MAC     Note:    Oropharynx: oropharynx clear of all foreign objects and spontaneously breathing  Level of Consciousness: drowsy  Oxygen Supplementation: face mask    Independent Airway: airway patency satisfactory and stable  Dentition: dentition unchanged  Vital Signs Stable: post-procedure vital signs reviewed and stable  Report to RN Given: handoff report given  Patient transferred to: Phase II    Handoff Report: Identifed the Patient, Identified the Reponsible Provider, Reviewed the pertinent medical history, Discussed the surgical course, Reviewed Intra-OP anesthesia mangement and issues during anesthesia, Set expectations for post-procedure period and Allowed opportunity for questions and acknowledgement of understanding      Vitals:  Vitals Value Taken Time   /76 03/10/25 1250   Temp     Pulse 61 03/10/25 1250   Resp 15 03/10/25 1250   SpO2 97 % 03/10/25 1255   Vitals shown include unfiled device data.    Electronically Signed By: CEE Mckenna CRNA  March 10, 2025  12:57 PM

## 2025-03-10 NOTE — ANESTHESIA POSTPROCEDURE EVALUATION
Patient: Rajinder Siegel    Procedure: Procedure(s):  COLONOSCOPY, WITH POLYPECTOMY       Anesthesia Type:  MAC    Note:  Disposition: Outpatient   Postop Pain Control: Uneventful            Sign Out: Well controlled pain   PONV: No   Neuro/Psych: Uneventful            Sign Out: Acceptable/Baseline neuro status   Airway/Respiratory: Uneventful            Sign Out: Acceptable/Baseline resp. status   CV/Hemodynamics: Uneventful            Sign Out: Acceptable CV status   Other NRE: NONE   DID A NON-ROUTINE EVENT OCCUR? No    Event details/Postop Comments:  Pt was happy with anesthesia care.  No complications.  I will follow up with the pt if needed.           Last vitals:  Vitals Value Taken Time   /76 03/10/25 1250   Temp     Pulse 61 03/10/25 1250   Resp 15 03/10/25 1250   SpO2 96 % 03/10/25 1256   Vitals shown include unfiled device data.    Electronically Signed By: CEE Mckenna CRNA  March 10, 2025  12:57 PM

## 2025-03-10 NOTE — DISCHARGE INSTRUCTIONS
[facility]    Home Care Following Endoscopy          Activity:  You have just undergone an endoscopic procedure usually performed with conscious sedation.  Do not work or operate machinery (including a car) for at least 12 hours.    I encourage you to walk and attempt to pass this air as soon as possible.    Diet:  Return to the diet you were on before your procedure but eat lightly for the first 12-24 hours.  Drink plenty of water.  Resume any regular medications unless otherwise advised by your physician.  Please begin any new medication prescribed as a result of your procedure as directed by your physician.   If you had any biopsy or polyp removed please refrain from aspirin or aspirin products for 2 days.  If on Coumadin please restart as instructed by your physician.   Pain:  You may take Tylenol as needed for pain.  Expected Recovery:  You can expect some mild abdominal fullness and/or discomfort due to the air used to inflate your intestinal tract.    Call Your Physician if You Have:    After Colonoscopy:  Worsening persisting abdominal pain which is worse with activity.  Fevers (>101 degrees F), chills or shakes.  Passage of continued blood with bowel movements.     Any questions or concerns about your recovery, please call [phone] or after hours [phone] ([phone]) Nurse Advice Line.    Follow-up Care:  IF YOU HAD polyps/biopsy tissue sample(s) removed.  The polyps/biopsy tissue sample(s) will be sent to pathology.    You should receive letter in your My Chart from Dr. Armenta with your results within 1-2 weeks. If you do not participate in My Chart a physical letter will come in the mail in 2-3 weeks.  Please call if you have not received a notification of your results.  If asked to return to clinic please make an appointment 1 week after your procedure.  Call [phone].

## 2025-03-11 ENCOUNTER — MYC MEDICAL ADVICE (OUTPATIENT)
Dept: FAMILY MEDICINE | Facility: OTHER | Age: 60
End: 2025-03-11
Payer: COMMERCIAL

## 2025-03-11 ENCOUNTER — TELEPHONE (OUTPATIENT)
Dept: FAMILY MEDICINE | Facility: OTHER | Age: 60
End: 2025-03-11
Payer: COMMERCIAL

## 2025-03-11 NOTE — TELEPHONE ENCOUNTER
INCOMING FORMS    Sender: [employer]     Type of Form, letter or note (What is requested?): fmla    How was the form received?: Fax    How should forms be returned?:  Fax : [phone]    Form placed in DJ bin for review/signature if appropriate.     TRAVIS not signed on these forms. Sent PT blank TRAVIS on [employer].

## 2025-03-12 LAB
PATH REPORT.COMMENTS IMP SPEC: NORMAL
PATH REPORT.COMMENTS IMP SPEC: NORMAL
PATH REPORT.FINAL DX SPEC: NORMAL
PATH REPORT.GROSS SPEC: NORMAL
PATH REPORT.MICROSCOPIC SPEC OTHER STN: NORMAL
PATH REPORT.RELEVANT HX SPEC: NORMAL
PHOTO IMAGE: NORMAL

## 2025-03-12 PROCEDURE — 88305 TISSUE EXAM BY PATHOLOGIST: CPT | Mod: 26 | Performed by: PATHOLOGY

## 2025-03-12 NOTE — TELEPHONE ENCOUNTER
Would recommend in-person appointment for these forms as they are much more involved than his previous forms.  OK to use my rounding spot today if that works for pt.

## 2025-03-13 ENCOUNTER — TRANSFERRED RECORDS (OUTPATIENT)
Dept: HEALTH INFORMATION MANAGEMENT | Facility: CLINIC | Age: 60
End: 2025-03-13

## 2025-03-13 ENCOUNTER — OFFICE VISIT (OUTPATIENT)
Dept: FAMILY MEDICINE | Facility: OTHER | Age: 60
End: 2025-03-13
Payer: COMMERCIAL

## 2025-03-13 ENCOUNTER — TELEPHONE (OUTPATIENT)
Dept: FAMILY MEDICINE | Facility: OTHER | Age: 60
End: 2025-03-13

## 2025-03-13 VITALS
BODY MASS INDEX: 31.84 KG/M2 | SYSTOLIC BLOOD PRESSURE: 130 MMHG | DIASTOLIC BLOOD PRESSURE: 76 MMHG | WEIGHT: 215 LBS | HEART RATE: 66 BPM | HEIGHT: 69 IN | OXYGEN SATURATION: 98 % | TEMPERATURE: 97.2 F | RESPIRATION RATE: 20 BRPM

## 2025-03-13 DIAGNOSIS — E78.2 MIXED HYPERLIPIDEMIA: ICD-10-CM

## 2025-03-13 DIAGNOSIS — M54.50 CHRONIC BILATERAL LOW BACK PAIN WITHOUT SCIATICA: Primary | ICD-10-CM

## 2025-03-13 DIAGNOSIS — R73.03 PREDIABETES: ICD-10-CM

## 2025-03-13 DIAGNOSIS — G89.29 CHRONIC BILATERAL LOW BACK PAIN WITHOUT SCIATICA: Primary | ICD-10-CM

## 2025-03-13 DIAGNOSIS — Z23 NEED FOR VACCINATION: ICD-10-CM

## 2025-03-13 DIAGNOSIS — G62.9 PERIPHERAL POLYNEUROPATHY: ICD-10-CM

## 2025-03-13 DIAGNOSIS — R20.0 NUMBNESS: ICD-10-CM

## 2025-03-13 LAB
ALBUMIN SERPL BCG-MCNC: 4.7 G/DL (ref 3.5–5.2)
ALP SERPL-CCNC: 68 U/L (ref 40–150)
ALT SERPL W P-5'-P-CCNC: 27 U/L (ref 0–70)
ANION GAP SERPL CALCULATED.3IONS-SCNC: 13 MMOL/L (ref 7–15)
AST SERPL W P-5'-P-CCNC: 28 U/L (ref 0–45)
BILIRUB SERPL-MCNC: 1.6 MG/DL
BUN SERPL-MCNC: 13.2 MG/DL (ref 8–23)
CALCIUM SERPL-MCNC: 9.9 MG/DL (ref 8.8–10.4)
CHLORIDE SERPL-SCNC: 104 MMOL/L (ref 98–107)
CHOLEST SERPL-MCNC: 202 MG/DL
CREAT SERPL-MCNC: 0.92 MG/DL (ref 0.67–1.17)
EGFRCR SERPLBLD CKD-EPI 2021: >90 ML/MIN/1.73M2
EST. AVERAGE GLUCOSE BLD GHB EST-MCNC: 123 MG/DL
FASTING STATUS PATIENT QL REPORTED: YES
FASTING STATUS PATIENT QL REPORTED: YES
GLUCOSE SERPL-MCNC: 100 MG/DL (ref 70–99)
HBA1C MFR BLD: 5.9 % (ref 0–5.6)
HCO3 SERPL-SCNC: 22 MMOL/L (ref 22–29)
HDLC SERPL-MCNC: 34 MG/DL
LDLC SERPL CALC-MCNC: 140 MG/DL
NONHDLC SERPL-MCNC: 168 MG/DL
POTASSIUM SERPL-SCNC: 4.1 MMOL/L (ref 3.4–5.3)
PROT SERPL-MCNC: 7 G/DL (ref 6.4–8.3)
SODIUM SERPL-SCNC: 139 MMOL/L (ref 135–145)
TRIGL SERPL-MCNC: 142 MG/DL

## 2025-03-13 ASSESSMENT — PAIN SCALES - GENERAL: PAINLEVEL_OUTOF10: MILD PAIN (2)

## 2025-03-13 NOTE — PROGRESS NOTES
Assessment & Plan       ICD-10-CM    1. Chronic bilateral low back pain without sciatica  M54.50     G89.29       2. Numbness  R20.0       3. Peripheral polyneuropathy  G62.9 Comprehensive metabolic panel (BMP + Alb, Alk Phos, ALT, AST, Total. Bili, TP)     Comprehensive metabolic panel (BMP + Alb, Alk Phos, ALT, AST, Total. Bili, TP)      4. Mixed hyperlipidemia  E78.2 Lipid panel reflex to direct LDL Fasting     Comprehensive metabolic panel (BMP + Alb, Alk Phos, ALT, AST, Total. Bili, TP)     Lipid panel reflex to direct LDL Fasting     Comprehensive metabolic panel (BMP + Alb, Alk Phos, ALT, AST, Total. Bili, TP)      5. Prediabetes  R73.03 Hemoglobin A1c     Comprehensive metabolic panel (BMP + Alb, Alk Phos, ALT, AST, Total. Bili, TP)     Hemoglobin A1c     Comprehensive metabolic panel (BMP + Alb, Alk Phos, ALT, AST, Total. Bili, TP)      6. Need for vaccination  Z23             Assessment & Plan     1, 2, 3.  Back Pain  Chronic back pain with associated buzzing and numbness.  Show to have peripheral neuropathy, likely related to prediabetes.  Otherwise, workup has not been revealing of a reversible cause.  Physical therapy targets tight back muscles contributing to symptoms. He has partial work capacity, returning to work with modified hours. The goal is full activity in one month, with reevaluation based on response to physical therapy and work demands. Vocational rehabilitation was discussed, and patient will consider.  - Continue physical therapy with prescribed exercises and stretches  - Allow work modifications: 6-7 hours a day, 3 days a week for the next month  - Reevaluate work capacity in one month  - Consider vocational rehabilitation if needed  - Allow position changes and extra breaks as needed  - Maintain current lifting restrictions, avoid lifting over 50 pounds    4.  Hyperlipidemia  He previously stopped rosuvastatin and reports feeling better, but has not yet restarted it. Cholesterol  levels are largely influenced by genetics, with diet contributing only 20% to cholesterol reduction. His LDL was 11 on medication and 119 off medication a year ago. A 50% reduction in cholesterol is needed to avoid medication, which is unlikely with diet alone. He is advised to delay restarting rosuvastatin until work conditions stabilize to differentiate between medication and work-related symptoms. His ten-year heart disease risk is 23%.  - Order fasting lipid panel  - Delay restarting rosuvastatin for at least two weeks  - Discuss potential resumption of rosuvastatin after two weeks if work conditions are stable    5.  Prediabetes  Clinically stable.  He is due for an A1c test to monitor glucose control.  - Order A1c test    6.  General Health Maintenance  He is due for several vaccinations, including tetanus, pneumonia, flu, and COVID booster. He expressed interest in updating the tetanus vaccine due to work with equipment but decided to defer all vaccinations at this time.  - Recommend updating tetanus vaccine  - Recommend pneumonia vaccine  - Offer flu and COVID booster vaccines             Portions of this note were completed using [employer] and/or Dragon dictation software.  Verbal patient consent received prior to use of AI software.  Although reviewed, there may be typographical and other inadvertent errors that remain.           Patient Instructions   Good luck getting back to work.      Hopefully, this will be the last form other than releasing you to full time employment with minimal restrictions.      Try resuming your statin medication when nothing else is changing for a couple of weeks, so we can see if that's causing your problems.      We will let you know your results as soon as we can.  If you have MyChart, you will be able to see your lab and imaging results shortly after they become available.  I will review these and let you know my interpretation, but this usually takes additional time.  If  you have multiple labs, I usually wait until they are all back before sending a note about them unless something is worrisome.  If you do not have MyChart, we will let you know your lab results as soon as we can.  If they are normal, this can take up to a week.    Update your immunizations when you can.    Contact us or return if questions or concerns.    Have a nice day!    Dr. Jolley    Rafael Calvillo is a 60 year old, presenting for the following health issues:  Follow Up ([facility] )      3/13/2025    12:46 PM   Additional Questions   Roomed by Delaney   Accompanied by Self         3/13/2025   Forms   Any forms needing to be completed Yes     History of Present Illness       Reason for visit:  Review of neuropathy of legs ahd back numbness and tingling.  Symptom onset:  More than a month  Symptom intensity:  Moderate  Symptom progression:  Staying the same    He eats 2-3 servings of fruits and vegetables daily.He consumes 0 sweetened beverage(s) daily.He exercises with enough effort to increase his heart rate 9 or less minutes per day.  He exercises with enough effort to increase his heart rate 3 or less days per week.   He is taking medications regularly.          Follow up visit for [facility] Paperwork      History of Present Illness    The patient presents to follow up on back pain and numbness and complete paperwork regarding work capacity.    He experiences back pain and numbness.  Workup has shown evidence of peripheral neuropathy, but no other obvious reversible changes at this time.  Prolonged rest has provided significant relief.  Back pain symptoms include a buzzing sensation and are exacerbated by prolonged standing and walking. He can walk up to a third of the day and sit for up to two-thirds of the day, but both activities can worsen his symptoms. Climbing stairs is manageable, but stooping, kneeling, and crawling are difficult due to a bad knee with no cartilage, causing significant discomfort. He can  "lift ten pounds frequently and twenty pounds occasionally, but lifting fifty pounds is not advisable at this time. He should be allowed to change positions when symptoms worsen.    He stopped taking rosuvastatin and felt better, but he has not restarted it yet. He has significantly changed his diet and is interested in checking his cholesterol levels again. His previous cholesterol levels on medication were significantly lower than off medication.    He reports chronic back pain, primarily in the middle and lower back, which is always present. Physical therapy earlier today seemed to help alleviate some of the discomfort. He notes that his right leg is weaker than his left, particularly during certain movements. No trouble with reaching or using his hands for tasks such as keyboarding.             The 10-year ASCVD risk score (Sigrid MCKEON, et al., 2019) is: 23.8%    Values used to calculate the score:      Age: 60 years      Sex: Male      Is Non- : No      Diabetic: Yes      Tobacco smoker: No      Systolic Blood Pressure: 130 mmHg      Is BP treated: Yes      HDL Cholesterol: 36 mg/dL      Total Cholesterol: 186 mg/dL           Objective    /76   Pulse 66   Temp 97.2  F (36.2  C) (Temporal)   Resp 20   Ht 1.75 m (5' 8.9\")   Wt 97.5 kg (215 lb)   SpO2 98%   BMI 31.84 kg/m    Body mass index is 31.84 kg/m .  Physical Exam   GENERAL: alert and no distress  NECK: no adenopathy, no asymmetry, masses, or scars  RESP: lungs clear to auscultation - no rales, rhonchi or wheezes  CV: regular rate and rhythm, normal S1 S2, no S3 or S4, no murmur, click or rub, no peripheral edema  ABDOMEN: soft, nontender, no hepatosplenomegaly, no masses and bowel sounds normal  MUSCULOSKELETAL: Normal strength in upper extremities. Normal knee movement. Right leg weaker than left leg with weaker abduction. Back pain in midline T10-T12.  NEUROLOGICAL: Cranial nerves grossly intact, moves all extremities " without gross motor or sensory deficit.           Admission on 03/10/2025, Discharged on 03/10/2025   Component Date Value Ref Range Status    GLUCOSE BY METER POCT 03/10/2025 106 (H)  70 - 99 mg/dL Final    Dr/RN Notified    Case Report 03/10/2025    Final                    Value:Surgical Pathology Report                         Case: [id number]                                  Authorizing Provider:  Trip Armenta,   Collected:           03/10/2025 12:37 PM                                 DO                                                                           Ordering Location:     [facility]          Received:            03/10/2025 01:31 PM                                 [facility]                                                          Pathologist:           Hammad Heath MD                                                            Specimen:    Large Intestine, Colon, Sigmoid, Sigmoid colon Polyp                                       Final Diagnosis 03/10/2025    Final                    Value:Colon, sigmoid, polypectomy:  --Tubular adenoma.        Clinical Information 03/10/2025    Final                    Value:Procedure:  COLONOSCOPY, WITH POLYPECTOMY  Pre-op Diagnosis: Screen for colon cancer [Z12.11]  Post-op Diagnosis: Z12.11 - Screen for colon cancer [ICD-10-CM]      Gross Description 03/10/2025    Final                    Value:A(1). Large Intestine, Colon, Sigmoid, Sigmoid colon Polyp:  The specimen is received in formalin, labeled with the patient's name, medical record number and other identifying information and designated  sigmoid colon polyp . It consists of a single tan soft tissue fragment measuring 0.2 cm. Entirely submitted in one cassette.   ARNOLDO Jack(ASCP)CM 3/11/2025 7:40 AM       Microscopic Description 03/10/2025    Final                    Value:Microscopic examination was performed.        Performing Labs 03/10/2025    Final                     Value:The technical component of this testing was completed at [facility].    Stain controls for all stains resulted within this report have been reviewed and show appropriate reactivity.       COLONOSCOPY 03/10/2025    Final                    Value:[facility]  [address] [place] [address]  _______________________________________________________________________________  Patient Name: Rajinder Siegel          Procedure Date: 3/10/2025 12:23 PM  MRN: [id number]                       Account Number: [id number]  YOB: 1965               Admit Type: Outpatient  Age: 60                               Room: [place] [address]  Gender: Male                          Note Status: Finalized  Attending MD: CHLOE ROSALES MD,  Total Sedation Time:   _______________________________________________________________________________     Procedure:             Colonoscopy  Indications:           Screening for colorectal malignant neoplasm  Providers:             CHLOE ROSALES MD  Referring MD:          MAG CONNELL PA-C  Medicines:             Monitored Anesthesia Care  Complications:         No immediate complications.  ____________________________________________________________________                          ___________  Procedure:             Pre-Anesthesia Assessment:                         - Prior to the procedure, a History and Physical was                          performed, and patient medications, allergies and                          sensitivities were reviewed. The patient's tolerance                          of previous anesthesia was reviewed.                         - The risks and benefits of the procedure and the                          sedation options and risks were discussed with the                          patient. All questions were answered and informed                           consent was obtained.                         After obtaining informed consent, the colonoscope was                          passed under direct vision. Throughout the procedure,                          the patient's blood pressure, pulse, and oxygen                          saturations were monitored continuously. The                          Colonoscope was introduced through the anus and                                                    advanced to the cecum, identified by appendiceal                          orifice and ileocecal valve. The colonoscopy was                          performed without difficulty. The patient tolerated                          the procedure well. The quality of the bowel                          preparation was good.                                                                                   Findings:       The perianal and digital rectal examinations were normal.       A 3 mm polyp was found in the sigmoid colon. The polyp was sessile. The        polyp was removed with a cold snare. Resection and retrieval were        complete.       The exam was otherwise without abnormality.                                                                                   Impression:            - One 3 mm polyp in the sigmoid colon, removed with a                          cold snare. Resected and retrieved.                         - The examination was otherwise normal.                            Recommendation:        - Discharge patient to home.                         - High fiber diet and low fat diet.                         - Continue present medications.                         - Await pathology results.                         - Repeat colonoscopy date to be determined after                          pending pathology results are reviewed for                          surveillance.                                                                                   Procedure Code(s):        --- Professional ---       80509, Colonoscopy, flexible; with removal of tumor(s), polyp(s), or        other lesion(s) by snare technique    CPT copyright 2022 [employer] Medical [employer]. All rights reserved.    The codes documented in this report are preliminary and upon  review may   be revised to meet current compliance requirements.    __________________________  CHLOE ROSALES MD  3/10/2025 12:41:31 PM  Number of Addenda: 0    Note Initiated On: 3/10/2025 12:23 PM       No results found for any visits on 03/13/25.  No results found for this or any previous visit (from the past 24 hours).        Signed Electronically by: Marco Jolley MD, MD

## 2025-03-13 NOTE — TELEPHONE ENCOUNTER
INCOMING FORMS    Sender: [facility]     Type of Form, letter or note (What is requested?): order    How was the form received?: Fax    How should forms be returned?:  Fax : [phone]    Form placed in DJ bin for review/signature if appropriate.

## 2025-03-13 NOTE — PATIENT INSTRUCTIONS
Good luck getting back to work.      Hopefully, this will be the last form other than releasing you to full time employment with minimal restrictions.      Try resuming your statin medication when nothing else is changing for a couple of weeks, so we can see if that's causing your problems.      We will let you know your results as soon as we can.  If you have MyChart, you will be able to see your lab and imaging results shortly after they become available.  I will review these and let you know my interpretation, but this usually takes additional time.  If you have multiple labs, I usually wait until they are all back before sending a note about them unless something is worrisome.  If you do not have MyChart, we will let you know your lab results as soon as we can.  If they are normal, this can take up to a week.    Update your immunizations when you can.    Contact us or return if questions or concerns.    Have a nice day!    Dr. Jolley

## 2025-03-19 DIAGNOSIS — R73.03 PREDIABETES: ICD-10-CM

## 2025-03-20 RX ORDER — METFORMIN HYDROCHLORIDE 500 MG/1
TABLET, EXTENDED RELEASE ORAL
Qty: 90 TABLET | Refills: 0 | Status: SHIPPED | OUTPATIENT
Start: 2025-03-20

## 2025-03-24 ENCOUNTER — PATIENT OUTREACH (OUTPATIENT)
Dept: CARE COORDINATION | Facility: CLINIC | Age: 60
End: 2025-03-24
Payer: COMMERCIAL

## 2025-03-24 ENCOUNTER — PATIENT OUTREACH (OUTPATIENT)
Dept: GASTROENTEROLOGY | Facility: CLINIC | Age: 60
End: 2025-03-24
Payer: COMMERCIAL

## 2025-03-24 PROBLEM — D12.6 ADENOMATOUS POLYP OF COLON: Status: ACTIVE | Noted: 2025-03-24

## 2025-04-07 ENCOUNTER — PATIENT OUTREACH (OUTPATIENT)
Dept: CARE COORDINATION | Facility: CLINIC | Age: 60
End: 2025-04-07
Payer: COMMERCIAL

## 2025-04-08 ENCOUNTER — TELEPHONE (OUTPATIENT)
Dept: FAMILY MEDICINE | Facility: OTHER | Age: 60
End: 2025-04-08
Payer: COMMERCIAL

## 2025-04-08 NOTE — TELEPHONE ENCOUNTER
INCOMING FORMS    Sender: [employer]    Type of Form, letter or note (What is requested?): la     How was the form received?: Fax    How should forms be returned?:  Fax : [phone]    Form placed in DJ bin for review/signature if appropriate.

## 2025-04-09 NOTE — TELEPHONE ENCOUNTER
Called patient and he is going not going back to work.  He is going to retire.   He will contact [employer] to see if if he needs forms completed and will let us know.  Routing to provider as fyi.

## 2025-04-09 NOTE — TELEPHONE ENCOUNTER
Please have pt schedule visit to go over this form.  If he is ready to resume full-time employment, he can simply state that, and I can release him.  If he needs ongoing restrictions, please schedule the appointment.

## 2025-04-10 ENCOUNTER — TELEPHONE (OUTPATIENT)
Dept: FAMILY MEDICINE | Facility: OTHER | Age: 60
End: 2025-04-10
Payer: COMMERCIAL

## 2025-04-10 NOTE — TELEPHONE ENCOUNTER
Pt does need forms completed. Hiowever he is not going back to work. They are trying to extend his benefits for him.

## 2025-04-10 NOTE — TELEPHONE ENCOUNTER
Will need to do visit.  Would be best in-person.  Okay to schedule in any open slot in the next week.

## 2025-04-10 NOTE — TELEPHONE ENCOUNTER
General Call    Contacts       Contact Date/Time Type Contact Phone/Fax    04/10/2025 10:32 AM CDT Phone (Incoming) Rajinder Siegel (Self) [phone] (H)          Reason for Call:  Insurance does still need information bc they are looking to extend benefits.  [facility]    Call Rajinder with questions: [phone]    What are your questions or concerns:  n/a    Date of last appointment with provider: n/a    Could we send this information to you in [place] or would you prefer to receive a phone call?:   Patient would prefer a phone call   Okay to leave a detailed message?: Yes at Cell number on file:    Telephone Information:   Mobile [phone]

## 2025-05-02 NOTE — TELEPHONE ENCOUNTER
Notified patient and scheduled him for Thursday per below.   Post Cardiac Surgery Discharge Instructions    Activity:     Walk every day. Record your activity in the log included in your Cardiac Education book.    No heavy lifting greater than 10lbs for 3 months. No push or pull activity for 3 months.    Use your breathing machine (incentive spirometer) several times a day until you are fully up and about (keep lungs healthy!)     Measure your blood pressure and weigh yourself once every day, record in the log included in your Cardiac Education book.    You may take short visits outside your home as soon as you feel up to it.    Hygiene:     Shower daily. Clean incision areas with mild soap (no antibacterial soap) and water; rinse and pat dry.     Do not use a hot tub, tub bath, whirlpool, or swimming pool for 8 weeks or until the incision sites are well healed.      No lotions, powders, or ointments on incision areas.      Avoid direct sunlight to incisions. If suntanned or sunburned during the first year, they may always remain red.      Diet:     Follow cardiac diet as recommended by the dietician     Sexual Activity:     You may resume when you are able to climb several flights of stairs. If unsure, ask your physician.     Driving:     No driving for 3 weeks. Always wear your seat belt.     When to call your doctor:     Fever over 100 degrees. Take your temperature twice a day for two weeks.     You are coughing a lot, bringing up dark, foul smelling mucous     Weight gain of more than 3 pounds in 3 days. Your discharge weight is: ________ lbs     Symptoms similar to pre-surgery     More than normal shortness of breath     Heart rate (pulse) becomes irregular or rapid     Lightheadedness or fainting      New movement or shifting in breastbone     Any change in pain, swelling, redness, or drainage from the incision areas

## 2025-05-31 ENCOUNTER — HEALTH MAINTENANCE LETTER (OUTPATIENT)
Age: 60
End: 2025-05-31

## 2025-06-21 ENCOUNTER — HEALTH MAINTENANCE LETTER (OUTPATIENT)
Age: 60
End: 2025-06-21

## 2025-06-26 ENCOUNTER — LAB (OUTPATIENT)
Dept: LAB | Facility: OTHER | Age: 60
End: 2025-06-26
Payer: MEDICAID

## 2025-06-26 ENCOUNTER — RESULTS FOLLOW-UP (OUTPATIENT)
Dept: FAMILY MEDICINE | Facility: OTHER | Age: 60
End: 2025-06-26

## 2025-06-26 DIAGNOSIS — M10.9 GOUTY ARTHRITIS OF LEFT GREAT TOE: ICD-10-CM

## 2025-06-26 DIAGNOSIS — R73.03 PREDIABETES: ICD-10-CM

## 2025-06-26 LAB
EST. AVERAGE GLUCOSE BLD GHB EST-MCNC: 126 MG/DL
HBA1C MFR BLD: 6 % (ref 0–5.6)
URATE SERPL-MCNC: 5 MG/DL (ref 3.4–7)

## 2025-07-28 ENCOUNTER — MYC REFILL (OUTPATIENT)
Dept: FAMILY MEDICINE | Facility: OTHER | Age: 60
End: 2025-07-28
Payer: MEDICAID

## 2025-07-28 DIAGNOSIS — I10 ESSENTIAL HYPERTENSION, BENIGN: ICD-10-CM

## 2025-07-28 DIAGNOSIS — E78.2 MIXED HYPERLIPIDEMIA: ICD-10-CM

## 2025-07-29 RX ORDER — LISINOPRIL 20 MG/1
20 TABLET ORAL DAILY
Qty: 90 TABLET | Refills: 1 | Status: SHIPPED | OUTPATIENT
Start: 2025-07-29

## 2025-07-29 RX ORDER — ROSUVASTATIN CALCIUM 10 MG/1
10 TABLET, COATED ORAL DAILY
Qty: 90 TABLET | Refills: 1 | Status: SHIPPED | OUTPATIENT
Start: 2025-07-29

## 2025-08-14 ENCOUNTER — PATIENT OUTREACH (OUTPATIENT)
Dept: CARE COORDINATION | Facility: CLINIC | Age: 60
End: 2025-08-14
Payer: MEDICAID

## 2025-08-17 ENCOUNTER — MYC REFILL (OUTPATIENT)
Dept: FAMILY MEDICINE | Facility: OTHER | Age: 60
End: 2025-08-17
Payer: MEDICAID

## 2025-08-17 DIAGNOSIS — M10.9 GOUTY ARTHRITIS OF LEFT GREAT TOE: ICD-10-CM

## 2025-08-18 RX ORDER — ALLOPURINOL 300 MG/1
150 TABLET ORAL DAILY
Qty: 45 TABLET | Refills: 1 | OUTPATIENT
Start: 2025-08-18

## (undated) DEVICE — Device

## (undated) DEVICE — SOL WATER IRRIG 1000ML BOTTLE 2F7114

## (undated) DEVICE — TUBING SUCTION 6"X3/16" N56A

## (undated) DEVICE — ENDO TRAP POLYP E-TRAP 00711099

## (undated) DEVICE — KIT ENDO TURNOVER/PROCEDURE CARRY-ON 101822